# Patient Record
Sex: FEMALE | Race: OTHER | HISPANIC OR LATINO | URBAN - METROPOLITAN AREA
[De-identification: names, ages, dates, MRNs, and addresses within clinical notes are randomized per-mention and may not be internally consistent; named-entity substitution may affect disease eponyms.]

---

## 2017-10-27 ENCOUNTER — EMERGENCY (EMERGENCY)
Facility: HOSPITAL | Age: 82
LOS: 1 days | Discharge: HOME | End: 2017-10-27
Attending: EMERGENCY MEDICINE
Payer: OTHER MISCELLANEOUS

## 2017-10-27 VITALS
TEMPERATURE: 98 F | WEIGHT: 110.01 LBS | RESPIRATION RATE: 18 BRPM | HEIGHT: 59 IN | HEART RATE: 90 BPM | DIASTOLIC BLOOD PRESSURE: 87 MMHG | OXYGEN SATURATION: 97 % | SYSTOLIC BLOOD PRESSURE: 122 MMHG

## 2017-10-27 DIAGNOSIS — Z79.82 LONG TERM (CURRENT) USE OF ASPIRIN: ICD-10-CM

## 2017-10-27 DIAGNOSIS — J44.0 CHRONIC OBSTRUCTIVE PULMONARY DISEASE WITH (ACUTE) LOWER RESPIRATORY INFECTION: ICD-10-CM

## 2017-10-27 DIAGNOSIS — R41.82 ALTERED MENTAL STATUS, UNSPECIFIED: ICD-10-CM

## 2017-10-27 DIAGNOSIS — I10 ESSENTIAL (PRIMARY) HYPERTENSION: ICD-10-CM

## 2017-10-27 DIAGNOSIS — Z86.73 PERSONAL HISTORY OF TRANSIENT ISCHEMIC ATTACK (TIA), AND CEREBRAL INFARCTION WITHOUT RESIDUAL DEFICITS: ICD-10-CM

## 2017-10-27 LAB
ALBUMIN SERPL ELPH-MCNC: 2.3 G/DL — LOW (ref 3.5–5)
ALP SERPL-CCNC: 74 U/L — SIGNIFICANT CHANGE UP (ref 40–120)
ALT FLD-CCNC: 18 U/L DA — SIGNIFICANT CHANGE UP (ref 10–60)
ANION GAP SERPL CALC-SCNC: 5 MMOL/L — SIGNIFICANT CHANGE UP (ref 5–17)
APTT BLD: 27 SEC — LOW (ref 27.5–37.4)
AST SERPL-CCNC: 14 U/L — SIGNIFICANT CHANGE UP (ref 10–40)
BASE EXCESS BLDA CALC-SCNC: 6.9 MMOL/L — HIGH (ref -2–2)
BASOPHILS # BLD AUTO: 0.1 K/UL — SIGNIFICANT CHANGE UP (ref 0–0.2)
BASOPHILS NFR BLD AUTO: 0.5 % — SIGNIFICANT CHANGE UP (ref 0–2)
BILIRUB SERPL-MCNC: 0.2 MG/DL — SIGNIFICANT CHANGE UP (ref 0.2–1.2)
BLOOD GAS COMMENTS ARTERIAL: SIGNIFICANT CHANGE UP
BUN SERPL-MCNC: 14 MG/DL — SIGNIFICANT CHANGE UP (ref 7–18)
CALCIUM SERPL-MCNC: 8.9 MG/DL — SIGNIFICANT CHANGE UP (ref 8.4–10.5)
CHLORIDE SERPL-SCNC: 96 MMOL/L — SIGNIFICANT CHANGE UP (ref 96–108)
CO2 SERPL-SCNC: 32 MMOL/L — HIGH (ref 22–31)
CREAT SERPL-MCNC: 0.33 MG/DL — LOW (ref 0.5–1.3)
EOSINOPHIL # BLD AUTO: 0.8 K/UL — HIGH (ref 0–0.5)
EOSINOPHIL NFR BLD AUTO: 6.8 % — HIGH (ref 0–6)
GLUCOSE SERPL-MCNC: 106 MG/DL — HIGH (ref 70–99)
HCO3 BLDA-SCNC: 32 MMOL/L — HIGH (ref 23–27)
HCT VFR BLD CALC: 42.6 % — SIGNIFICANT CHANGE UP (ref 34.5–45)
HGB BLD-MCNC: 13.9 G/DL — SIGNIFICANT CHANGE UP (ref 11.5–15.5)
HOROWITZ INDEX BLDA+IHG-RTO: 28 — SIGNIFICANT CHANGE UP
INR BLD: 1.06 RATIO — SIGNIFICANT CHANGE UP (ref 0.88–1.16)
LACTATE SERPL-SCNC: 0.8 MMOL/L — SIGNIFICANT CHANGE UP (ref 0.7–2)
LIDOCAIN IGE QN: 74 U/L — SIGNIFICANT CHANGE UP (ref 73–393)
LYMPHOCYTES # BLD AUTO: 17.6 % — SIGNIFICANT CHANGE UP (ref 13–44)
LYMPHOCYTES # BLD AUTO: 2.1 K/UL — SIGNIFICANT CHANGE UP (ref 1–3.3)
MCHC RBC-ENTMCNC: 28.4 PG — SIGNIFICANT CHANGE UP (ref 27–34)
MCHC RBC-ENTMCNC: 32.7 GM/DL — SIGNIFICANT CHANGE UP (ref 32–36)
MCV RBC AUTO: 86.8 FL — SIGNIFICANT CHANGE UP (ref 80–100)
MONOCYTES # BLD AUTO: 0.7 K/UL — SIGNIFICANT CHANGE UP (ref 0–0.9)
MONOCYTES NFR BLD AUTO: 5.8 % — SIGNIFICANT CHANGE UP (ref 2–14)
NEUTROPHILS # BLD AUTO: 8.3 K/UL — HIGH (ref 1.8–7.4)
NEUTROPHILS NFR BLD AUTO: 69.3 % — SIGNIFICANT CHANGE UP (ref 43–77)
PCO2 BLDA: 47 MMHG — HIGH (ref 32–46)
PH BLDA: 7.45 — SIGNIFICANT CHANGE UP (ref 7.35–7.45)
PLATELET # BLD AUTO: 379 K/UL — SIGNIFICANT CHANGE UP (ref 150–400)
PO2 BLDA: 76 MMHG — SIGNIFICANT CHANGE UP (ref 74–108)
POTASSIUM SERPL-MCNC: 4.2 MMOL/L — SIGNIFICANT CHANGE UP (ref 3.5–5.3)
POTASSIUM SERPL-SCNC: 4.2 MMOL/L — SIGNIFICANT CHANGE UP (ref 3.5–5.3)
PROT SERPL-MCNC: 6.7 G/DL — SIGNIFICANT CHANGE UP (ref 6–8.3)
PROTHROM AB SERPL-ACNC: 11.6 SEC — SIGNIFICANT CHANGE UP (ref 9.8–12.7)
RBC # BLD: 4.91 M/UL — SIGNIFICANT CHANGE UP (ref 3.8–5.2)
RBC # FLD: 11.9 % — SIGNIFICANT CHANGE UP (ref 10.3–14.5)
SAO2 % BLDA: 96 % — SIGNIFICANT CHANGE UP (ref 92–96)
SODIUM SERPL-SCNC: 133 MMOL/L — LOW (ref 135–145)
WBC # BLD: 11.9 K/UL — HIGH (ref 3.8–10.5)
WBC # FLD AUTO: 11.9 K/UL — HIGH (ref 3.8–10.5)

## 2017-10-27 PROCEDURE — 71010: CPT | Mod: 26

## 2017-10-27 PROCEDURE — 70450 CT HEAD/BRAIN W/O DYE: CPT | Mod: 26

## 2017-10-27 PROCEDURE — 99285 EMERGENCY DEPT VISIT HI MDM: CPT

## 2017-10-27 RX ORDER — SODIUM CHLORIDE 9 MG/ML
1000 INJECTION INTRAMUSCULAR; INTRAVENOUS; SUBCUTANEOUS ONCE
Qty: 0 | Refills: 0 | Status: COMPLETED | OUTPATIENT
Start: 2017-10-27 | End: 2017-10-27

## 2017-10-27 RX ORDER — SODIUM CHLORIDE 9 MG/ML
3 INJECTION INTRAMUSCULAR; INTRAVENOUS; SUBCUTANEOUS ONCE
Qty: 0 | Refills: 0 | Status: COMPLETED | OUTPATIENT
Start: 2017-10-27 | End: 2017-10-27

## 2017-10-27 RX ADMIN — SODIUM CHLORIDE 3 MILLILITER(S): 9 INJECTION INTRAMUSCULAR; INTRAVENOUS; SUBCUTANEOUS at 22:10

## 2017-10-27 RX ADMIN — SODIUM CHLORIDE 3000 MILLILITER(S): 9 INJECTION INTRAMUSCULAR; INTRAVENOUS; SUBCUTANEOUS at 22:07

## 2017-10-27 NOTE — ED PROVIDER NOTE - PROGRESS NOTE DETAILS
labs shows 11.9k, cmp unremarkable, UA shows contaminated sample with moderate epis, CThead unremarkable, CXR shows no focal infiltrate  discussed above results with patient and family, family report pt currently at baseline mental status  will give levaquin for bronchitis in COPD patient in setting of cough  patient stable for discharge

## 2017-10-27 NOTE — ED PROVIDER NOTE - OBJECTIVE STATEMENT
89 y/o F pt with PMHx of systolic failure, HTN, COPD, and CVA BIB EMS from nursing home ([facility]) for AMS x 1 week and cough x 2 weeks. Per family, pt has been acting confused, lethargic, and has not been eating. Pt denies chest pain, difficulty breathing, abd pain, vomiting or any other complaints. NKDA.

## 2017-10-27 NOTE — ED PROVIDER NOTE - CARE PLAN
Principal Discharge DX:	Acute bronchitis  Secondary Diagnosis:	COPD (chronic obstructive pulmonary disease)

## 2017-10-27 NOTE — ED PROVIDER NOTE - MEDICAL DECISION MAKING DETAILS
Pt presents from nursing home with AMS. Will obtain labs, UA, CXR, and CT head. Given 1L NS for hydration. Will likely admit for further management.

## 2017-10-27 NOTE — ED ADULT NURSE NOTE - OBJECTIVE STATEMENT
presented to ed with  lethargic since monday,decrease appetite.pt.is  awake  and  responsive. daughter  at bedside.not  in  distress

## 2017-10-27 NOTE — ED PROVIDER NOTE - PMH
COPD (chronic obstructive pulmonary disease)    CVA (cerebral vascular accident)    Heart failure, systolic    History of hypertension

## 2017-10-28 VITALS
OXYGEN SATURATION: 98 % | DIASTOLIC BLOOD PRESSURE: 67 MMHG | RESPIRATION RATE: 20 BRPM | HEART RATE: 89 BPM | SYSTOLIC BLOOD PRESSURE: 121 MMHG | TEMPERATURE: 99 F

## 2017-10-28 LAB
APPEARANCE UR: CLEAR — SIGNIFICANT CHANGE UP
BILIRUB UR-MCNC: NEGATIVE — SIGNIFICANT CHANGE UP
COLOR SPEC: YELLOW — SIGNIFICANT CHANGE UP
DIFF PNL FLD: ABNORMAL
GLUCOSE UR QL: NEGATIVE — SIGNIFICANT CHANGE UP
KETONES UR-MCNC: NEGATIVE — SIGNIFICANT CHANGE UP
LEUKOCYTE ESTERASE UR-ACNC: ABNORMAL
NITRITE UR-MCNC: NEGATIVE — SIGNIFICANT CHANGE UP
PH UR: 8 — SIGNIFICANT CHANGE UP (ref 5–8)
PROT UR-MCNC: NEGATIVE — SIGNIFICANT CHANGE UP
SP GR SPEC: 1.01 — SIGNIFICANT CHANGE UP (ref 1.01–1.02)
UROBILINOGEN FLD QL: NEGATIVE — SIGNIFICANT CHANGE UP

## 2017-10-28 PROCEDURE — 81001 URINALYSIS AUTO W/SCOPE: CPT

## 2017-10-28 PROCEDURE — 87040 BLOOD CULTURE FOR BACTERIA: CPT

## 2017-10-28 PROCEDURE — 70450 CT HEAD/BRAIN W/O DYE: CPT

## 2017-10-28 PROCEDURE — 36416 COLLJ CAPILLARY BLOOD SPEC: CPT

## 2017-10-28 PROCEDURE — 87086 URINE CULTURE/COLONY COUNT: CPT

## 2017-10-28 PROCEDURE — 85730 THROMBOPLASTIN TIME PARTIAL: CPT

## 2017-10-28 PROCEDURE — 93005 ELECTROCARDIOGRAM TRACING: CPT

## 2017-10-28 PROCEDURE — 36000 PLACE NEEDLE IN VEIN: CPT

## 2017-10-28 PROCEDURE — 83605 ASSAY OF LACTIC ACID: CPT

## 2017-10-28 PROCEDURE — 80053 COMPREHEN METABOLIC PANEL: CPT

## 2017-10-28 PROCEDURE — 83690 ASSAY OF LIPASE: CPT

## 2017-10-28 PROCEDURE — 82962 GLUCOSE BLOOD TEST: CPT

## 2017-10-28 PROCEDURE — 85027 COMPLETE CBC AUTOMATED: CPT

## 2017-10-28 PROCEDURE — 99284 EMERGENCY DEPT VISIT MOD MDM: CPT | Mod: 25

## 2017-10-28 PROCEDURE — 71045 X-RAY EXAM CHEST 1 VIEW: CPT

## 2017-10-28 PROCEDURE — 82803 BLOOD GASES ANY COMBINATION: CPT

## 2017-10-28 PROCEDURE — 85610 PROTHROMBIN TIME: CPT

## 2017-10-28 RX ORDER — CIPROFLOXACIN LACTATE 400MG/40ML
1 VIAL (ML) INTRAVENOUS
Qty: 7 | Refills: 0 | OUTPATIENT
Start: 2017-10-28 | End: 2017-11-04

## 2017-10-29 LAB
CULTURE RESULTS: SIGNIFICANT CHANGE UP
SPECIMEN SOURCE: SIGNIFICANT CHANGE UP

## 2017-11-02 LAB
CULTURE RESULTS: SIGNIFICANT CHANGE UP
CULTURE RESULTS: SIGNIFICANT CHANGE UP
SPECIMEN SOURCE: SIGNIFICANT CHANGE UP
SPECIMEN SOURCE: SIGNIFICANT CHANGE UP

## 2018-05-08 ENCOUNTER — INPATIENT (INPATIENT)
Facility: HOSPITAL | Age: 83
LOS: 7 days | Discharge: NURSING, REHAB OR LONG-TERM CARE | DRG: 682 | End: 2018-05-16
Attending: INTERNAL MEDICINE | Admitting: INTERNAL MEDICINE
Payer: MEDICARE

## 2018-05-08 VITALS
OXYGEN SATURATION: 99 % | RESPIRATION RATE: 18 BRPM | WEIGHT: 130.07 LBS | SYSTOLIC BLOOD PRESSURE: 140 MMHG | DIASTOLIC BLOOD PRESSURE: 77 MMHG | HEIGHT: 64 IN | HEART RATE: 82 BPM

## 2018-05-08 DIAGNOSIS — I10 ESSENTIAL (PRIMARY) HYPERTENSION: ICD-10-CM

## 2018-05-08 DIAGNOSIS — K57.33 DIVERTICULITIS OF LARGE INTESTINE WITHOUT PERFORATION OR ABSCESS WITH BLEEDING: ICD-10-CM

## 2018-05-08 DIAGNOSIS — J44.9 CHRONIC OBSTRUCTIVE PULMONARY DISEASE, UNSPECIFIED: ICD-10-CM

## 2018-05-08 DIAGNOSIS — G93.40 ENCEPHALOPATHY, UNSPECIFIED: ICD-10-CM

## 2018-05-08 DIAGNOSIS — Z29.9 ENCOUNTER FOR PROPHYLACTIC MEASURES, UNSPECIFIED: ICD-10-CM

## 2018-05-08 DIAGNOSIS — N17.9 ACUTE KIDNEY FAILURE, UNSPECIFIED: ICD-10-CM

## 2018-05-08 DIAGNOSIS — E87.0 HYPEROSMOLALITY AND HYPERNATREMIA: ICD-10-CM

## 2018-05-08 DIAGNOSIS — N13.30 UNSPECIFIED HYDRONEPHROSIS: ICD-10-CM

## 2018-05-08 DIAGNOSIS — K57.32 DIVERTICULITIS OF LARGE INTESTINE WITHOUT PERFORATION OR ABSCESS WITHOUT BLEEDING: ICD-10-CM

## 2018-05-08 LAB
ALBUMIN SERPL ELPH-MCNC: 2.3 G/DL — LOW (ref 3.5–5)
ALP SERPL-CCNC: 52 U/L — SIGNIFICANT CHANGE UP (ref 40–120)
ALT FLD-CCNC: 11 U/L DA — SIGNIFICANT CHANGE UP (ref 10–60)
ANION GAP SERPL CALC-SCNC: 6 MMOL/L — SIGNIFICANT CHANGE UP (ref 5–17)
APPEARANCE UR: CLEAR — SIGNIFICANT CHANGE UP
AST SERPL-CCNC: 19 U/L — SIGNIFICANT CHANGE UP (ref 10–40)
BASOPHILS # BLD AUTO: 0.1 K/UL — SIGNIFICANT CHANGE UP (ref 0–0.2)
BASOPHILS NFR BLD AUTO: 0.5 % — SIGNIFICANT CHANGE UP (ref 0–2)
BILIRUB SERPL-MCNC: 0.3 MG/DL — SIGNIFICANT CHANGE UP (ref 0.2–1.2)
BILIRUB UR-MCNC: NEGATIVE — SIGNIFICANT CHANGE UP
BUN SERPL-MCNC: 73 MG/DL — HIGH (ref 7–18)
CALCIUM SERPL-MCNC: 8.5 MG/DL — SIGNIFICANT CHANGE UP (ref 8.4–10.5)
CHLORIDE SERPL-SCNC: 117 MMOL/L — HIGH (ref 96–108)
CO2 SERPL-SCNC: 27 MMOL/L — SIGNIFICANT CHANGE UP (ref 22–31)
COLOR SPEC: YELLOW — SIGNIFICANT CHANGE UP
CREAT SERPL-MCNC: 2.12 MG/DL — HIGH (ref 0.5–1.3)
DIFF PNL FLD: ABNORMAL
EOSINOPHIL # BLD AUTO: 0.5 K/UL — SIGNIFICANT CHANGE UP (ref 0–0.5)
EOSINOPHIL NFR BLD AUTO: 4 % — SIGNIFICANT CHANGE UP (ref 0–6)
GLUCOSE SERPL-MCNC: 88 MG/DL — SIGNIFICANT CHANGE UP (ref 70–99)
GLUCOSE UR QL: NEGATIVE — SIGNIFICANT CHANGE UP
HCT VFR BLD CALC: 42.8 % — SIGNIFICANT CHANGE UP (ref 34.5–45)
HGB BLD-MCNC: 13.6 G/DL — SIGNIFICANT CHANGE UP (ref 11.5–15.5)
KETONES UR-MCNC: NEGATIVE — SIGNIFICANT CHANGE UP
LEUKOCYTE ESTERASE UR-ACNC: ABNORMAL
LYMPHOCYTES # BLD AUTO: 1.4 K/UL — SIGNIFICANT CHANGE UP (ref 1–3.3)
LYMPHOCYTES # BLD AUTO: 10.7 % — LOW (ref 13–44)
MCHC RBC-ENTMCNC: 27.9 PG — SIGNIFICANT CHANGE UP (ref 27–34)
MCHC RBC-ENTMCNC: 31.8 GM/DL — LOW (ref 32–36)
MCV RBC AUTO: 87.7 FL — SIGNIFICANT CHANGE UP (ref 80–100)
MONOCYTES # BLD AUTO: 0.8 K/UL — SIGNIFICANT CHANGE UP (ref 0–0.9)
MONOCYTES NFR BLD AUTO: 5.9 % — SIGNIFICANT CHANGE UP (ref 2–14)
NEUTROPHILS # BLD AUTO: 10.2 K/UL — HIGH (ref 1.8–7.4)
NEUTROPHILS NFR BLD AUTO: 78.8 % — HIGH (ref 43–77)
NITRITE UR-MCNC: NEGATIVE — SIGNIFICANT CHANGE UP
PH UR: 6 — SIGNIFICANT CHANGE UP (ref 5–8)
PLATELET # BLD AUTO: 211 K/UL — SIGNIFICANT CHANGE UP (ref 150–400)
POTASSIUM SERPL-MCNC: 4.2 MMOL/L — SIGNIFICANT CHANGE UP (ref 3.5–5.3)
POTASSIUM SERPL-SCNC: 4.2 MMOL/L — SIGNIFICANT CHANGE UP (ref 3.5–5.3)
PROT SERPL-MCNC: 7.1 G/DL — SIGNIFICANT CHANGE UP (ref 6–8.3)
PROT UR-MCNC: 15
RBC # BLD: 4.89 M/UL — SIGNIFICANT CHANGE UP (ref 3.8–5.2)
RBC # FLD: 12.8 % — SIGNIFICANT CHANGE UP (ref 10.3–14.5)
SODIUM SERPL-SCNC: 150 MMOL/L — HIGH (ref 135–145)
SP GR SPEC: 1.01 — SIGNIFICANT CHANGE UP (ref 1.01–1.02)
UROBILINOGEN FLD QL: NEGATIVE — SIGNIFICANT CHANGE UP
WBC # BLD: 12.9 K/UL — HIGH (ref 3.8–10.5)
WBC # FLD AUTO: 12.9 K/UL — HIGH (ref 3.8–10.5)

## 2018-05-08 PROCEDURE — 99285 EMERGENCY DEPT VISIT HI MDM: CPT

## 2018-05-08 PROCEDURE — 74176 CT ABD & PELVIS W/O CONTRAST: CPT | Mod: 26

## 2018-05-08 PROCEDURE — 70450 CT HEAD/BRAIN W/O DYE: CPT | Mod: 26

## 2018-05-08 RX ORDER — POLYETHYLENE GLYCOL 3350 17 G/17G
17 POWDER, FOR SOLUTION ORAL DAILY
Qty: 0 | Refills: 0 | Status: DISCONTINUED | OUTPATIENT
Start: 2018-05-08 | End: 2018-05-16

## 2018-05-08 RX ORDER — FAMOTIDINE 10 MG/ML
20 INJECTION INTRAVENOUS DAILY
Qty: 0 | Refills: 0 | Status: DISCONTINUED | OUTPATIENT
Start: 2018-05-08 | End: 2018-05-16

## 2018-05-08 RX ORDER — ASCORBIC ACID 60 MG
500 TABLET,CHEWABLE ORAL DAILY
Qty: 0 | Refills: 0 | Status: DISCONTINUED | OUTPATIENT
Start: 2018-05-08 | End: 2018-05-16

## 2018-05-08 RX ORDER — SENNA PLUS 8.6 MG/1
2 TABLET ORAL
Qty: 0 | Refills: 0 | Status: DISCONTINUED | OUTPATIENT
Start: 2018-05-08 | End: 2018-05-16

## 2018-05-08 RX ORDER — SIMVASTATIN 20 MG/1
1 TABLET, FILM COATED ORAL
Qty: 0 | Refills: 0 | COMMUNITY

## 2018-05-08 RX ORDER — LACTULOSE 10 G/15ML
20 SOLUTION ORAL DAILY
Qty: 0 | Refills: 0 | Status: DISCONTINUED | OUTPATIENT
Start: 2018-05-08 | End: 2018-05-16

## 2018-05-08 RX ORDER — ASCORBIC ACID 60 MG
1 TABLET,CHEWABLE ORAL
Qty: 0 | Refills: 0 | COMMUNITY

## 2018-05-08 RX ORDER — PIPERACILLIN AND TAZOBACTAM 4; .5 G/20ML; G/20ML
3.38 INJECTION, POWDER, LYOPHILIZED, FOR SOLUTION INTRAVENOUS EVERY 12 HOURS
Qty: 0 | Refills: 0 | Status: DISCONTINUED | OUTPATIENT
Start: 2018-05-08 | End: 2018-05-16

## 2018-05-08 RX ORDER — CALCIUM CARBONATE 500(1250)
1 TABLET ORAL
Qty: 0 | Refills: 0 | COMMUNITY

## 2018-05-08 RX ORDER — CARVEDILOL PHOSPHATE 80 MG/1
3.12 CAPSULE, EXTENDED RELEASE ORAL EVERY 12 HOURS
Qty: 0 | Refills: 0 | Status: DISCONTINUED | OUTPATIENT
Start: 2018-05-08 | End: 2018-05-16

## 2018-05-08 RX ORDER — CALCIUM CARBONATE 500(1250)
1 TABLET ORAL
Qty: 0 | Refills: 0 | Status: DISCONTINUED | OUTPATIENT
Start: 2018-05-08 | End: 2018-05-16

## 2018-05-08 RX ORDER — SODIUM CHLORIDE 9 MG/ML
1000 INJECTION, SOLUTION INTRAVENOUS
Qty: 0 | Refills: 0 | Status: DISCONTINUED | OUTPATIENT
Start: 2018-05-08 | End: 2018-05-10

## 2018-05-08 RX ORDER — CARVEDILOL PHOSPHATE 80 MG/1
1 CAPSULE, EXTENDED RELEASE ORAL
Qty: 0 | Refills: 0 | COMMUNITY

## 2018-05-08 RX ORDER — HEPARIN SODIUM 5000 [USP'U]/ML
5000 INJECTION INTRAVENOUS; SUBCUTANEOUS EVERY 12 HOURS
Qty: 0 | Refills: 0 | Status: DISCONTINUED | OUTPATIENT
Start: 2018-05-08 | End: 2018-05-16

## 2018-05-08 RX ORDER — ASPIRIN/CALCIUM CARB/MAGNESIUM 324 MG
81 TABLET ORAL DAILY
Qty: 0 | Refills: 0 | Status: DISCONTINUED | OUTPATIENT
Start: 2018-05-08 | End: 2018-05-16

## 2018-05-08 RX ORDER — LISINOPRIL 2.5 MG/1
1 TABLET ORAL
Qty: 0 | Refills: 0 | COMMUNITY

## 2018-05-08 RX ORDER — DOCUSATE SODIUM 100 MG
3 CAPSULE ORAL
Qty: 0 | Refills: 0 | COMMUNITY

## 2018-05-08 RX ORDER — SODIUM CHLORIDE 9 MG/ML
1 INJECTION INTRAMUSCULAR; INTRAVENOUS; SUBCUTANEOUS
Qty: 0 | Refills: 0 | COMMUNITY

## 2018-05-08 RX ORDER — RANITIDINE HYDROCHLORIDE 150 MG/1
1 TABLET, FILM COATED ORAL
Qty: 0 | Refills: 0 | COMMUNITY

## 2018-05-08 RX ORDER — SODIUM CHLORIDE 9 MG/ML
1000 INJECTION INTRAMUSCULAR; INTRAVENOUS; SUBCUTANEOUS ONCE
Qty: 0 | Refills: 0 | Status: COMPLETED | OUTPATIENT
Start: 2018-05-08 | End: 2018-05-08

## 2018-05-08 RX ORDER — DOCUSATE SODIUM 100 MG
300 CAPSULE ORAL DAILY
Qty: 0 | Refills: 0 | Status: DISCONTINUED | OUTPATIENT
Start: 2018-05-08 | End: 2018-05-16

## 2018-05-08 RX ORDER — ASPIRIN/CALCIUM CARB/MAGNESIUM 324 MG
1 TABLET ORAL
Qty: 0 | Refills: 0 | COMMUNITY

## 2018-05-08 RX ORDER — SENNA PLUS 8.6 MG/1
2 TABLET ORAL
Qty: 0 | Refills: 0 | COMMUNITY

## 2018-05-08 RX ORDER — IPRATROPIUM/ALBUTEROL SULFATE 18-103MCG
3 AEROSOL WITH ADAPTER (GRAM) INHALATION
Qty: 0 | Refills: 0 | COMMUNITY

## 2018-05-08 RX ORDER — IPRATROPIUM/ALBUTEROL SULFATE 18-103MCG
3 AEROSOL WITH ADAPTER (GRAM) INHALATION EVERY 6 HOURS
Qty: 0 | Refills: 0 | Status: DISCONTINUED | OUTPATIENT
Start: 2018-05-08 | End: 2018-05-16

## 2018-05-08 RX ADMIN — SODIUM CHLORIDE 250 MILLILITER(S): 9 INJECTION INTRAMUSCULAR; INTRAVENOUS; SUBCUTANEOUS at 18:23

## 2018-05-08 NOTE — H&P ADULT - PROBLEM SELECTOR PLAN 7
IMPROVE VTE Individual Risk Assessment    RISK                                                          Points  [] Previous VTE                                           3  [] Thrombophilia                                        2  [] Lower limb paralysis                              2   [] Current Cancer                                       2   [x] Immobilization > 24 hrs                        1  [] ICU/CCU stay > 24 hours                       1  [x] Age > 60                                                   1    IMPROVE VTE Score: 2, will give hsq for dvt ppx  c/w home zantac for gastritis

## 2018-05-08 NOTE — H&P ADULT - PMH
CAD (coronary artery disease)    COPD (chronic obstructive pulmonary disease)    CVA (cerebral vascular accident)    GERD (gastroesophageal reflux disease)    History of hypertension

## 2018-05-08 NOTE — H&P ADULT - PROBLEM SELECTOR PLAN 2
possibly a combination of pre-renal and post-renal  -will treat pre-renal with IV fluids as per below  -will place patrick for post-renal  -monitor renal function daily for improvement

## 2018-05-08 NOTE — ED PROVIDER NOTE - PMH
COPD (chronic obstructive pulmonary disease)    CVA (cerebral vascular accident)    Dementia    Heart failure, systolic    History of hypertension

## 2018-05-08 NOTE — ED PROVIDER NOTE - OBJECTIVE STATEMENT
89 y/o F pt with PMHx of COPD, CVA, Dementia, Systolic Heart Failure, and HTN and no significant PSHx BIB family from nursing home to ED with decreased PO intake and failure to thrive noted today. Family reports pt also with mild vague signs of abdominal pain. Per family, pt is dry appearing, but pt is currently at her baseline mental status. Family states pt appears mildly weaker than usual. Family deny any other pt complaints. Hx and HPI limited due to pt's condition (dementia). NKDA.

## 2018-05-08 NOTE — H&P ADULT - PROBLEM SELECTOR PLAN 5
likely a combination of infection, acute renal failure and hypernatremia  -will treat each disease and await improvement  -however given advanced age it is possible that encephalopathy may not reverse

## 2018-05-08 NOTE — ED ADULT NURSE NOTE - ED STAT RN HANDOFF DETAILS 2
Received patient from RN Pita admit to medicine, with family at bedside . with patrick catheter draining in no acute distress. Continue to monitor patient.. No bed assigned as yet.

## 2018-05-08 NOTE — H&P ADULT - ASSESSMENT
89F from [facility] HTN, COPD, CVA w/ residual right sided weakness, HLD, Hyponatremia, CAD, Gastritis, mild cognitive impairment who comes to the hospital for failure to thrive and altered mental status. Patient found to have sigmoid diverticulitis, acute renal failure, hypernatremia, and failure to thrive

## 2018-05-08 NOTE — ED PROVIDER NOTE - MEDICAL DECISION MAKING DETAILS
89 y/o F pt presents with failure to thrive. Will evaluate for abdominal pathology, electrolyte abnormalities, and soft fluid resuscitation. Will admit.

## 2018-05-08 NOTE — H&P ADULT - NSHPLABSRESULTS_GEN_ALL_CORE
elevated wbc  H/H wnl  Na at 150  Other lytes roughly wnl  Cr. 2.12, previously was 0.33 in Oct 2017  UA negative with some granular casts and hyaline casts  CT shows no lung pathology but shows sigmoid diverticulitis and bilateral mild hydronephrosis

## 2018-05-08 NOTE — ED ADULT NURSE NOTE - ED STAT RN HANDOFF DETAILS
Pt endorsed to JAQUAN Frankel. pt has stage 1 redness on her back. pt medicated per order. pt hemodynamically stable. Pt not in any acute distress.

## 2018-05-08 NOTE — CONSULT NOTE ADULT - ASSESSMENT
89 y/o F pt with PMHx of systolic failure, HTN, COPD, and CVA BIB EMS from nursing home ([facility]) for AMS x 1 week and cough x 2 weeks. Per family, pt has been acting confused, lethargic, and has not been eating. Pt denies chest pain, difficulty breathing, abd pain, vomiting or any other complaints.  CT of abdomen showed Sigmoid diverticulitis without abscess or perforation, Mild bilateral hydronephrosis may be due to distended bladder, and Cholelithiasis

## 2018-05-08 NOTE — H&P ADULT - PROBLEM SELECTOR PLAN 1
patient has elevated wbc and ct findings of infection  -will treat with zosyn as per discussion with Dr. Villagomez  -monitor wbc count  -monitor for clinical improvement  -hydrate with IV fluids

## 2018-05-08 NOTE — H&P ADULT - NSHPPHYSICALEXAM_GEN_ALL_CORE
Vital Signs Last 24 Hrs  T(C): 36.6 (08 May 2018 16:37), Max: 36.6 (08 May 2018 16:37)  T(F): 97.8 (08 May 2018 16:37), Max: 97.8 (08 May 2018 16:37)  HR: 83 (08 May 2018 16:37) (82 - 83)  BP: 128/84 (08 May 2018 16:37) (128/84 - 140/77)  BP(mean): --  RR: 18 (08 May 2018 16:37) (18 - 18)  SpO2: 100% (08 May 2018 16:37) (99% - 100%)    GENERAL: NAD, well-groomed, well-developed  HEAD:  Atraumatic, Normocephalic  EYES: EOMI, PERRLA, conjunctiva and sclera clear  ENMT: No tonsillar erythema, exudates, or enlargement; dry mucous membranes  NECK: Supple, No JVD, Normal thyroid  NERVOUS SYSTEM:  Alert & Oriented X1 but follows commands  CHEST/LUNG: Clear to auscultation bilaterally; No rales, rhonchi, wheezing, or rubs  HEART: Regular rate and rhythm; No murmurs, rubs, or gallops  ABDOMEN: Soft, Nontender, Nondistended; Bowel sounds present  EXTREMITIES:  2+ Peripheral Pulses, non pitting pedal edema

## 2018-05-08 NOTE — CONSULT NOTE ADULT - SUBJECTIVE AND OBJECTIVE BOX
HPI:  89 y/o F pt with PMHx of systolic failure, HTN, COPD, and CVA BIB EMS from nursing home ([facility]) for AMS x 1 week and cough x 2 weeks. Per family, pt has been acting confused, lethargic, and has not been eating. Pt denies chest pain, difficulty breathing, abd pain, vomiting or any other complaints.    PAST MEDICAL & SURGICAL HISTORY:  Dementia  CVA (cerebral vascular accident)  COPD (chronic obstructive pulmonary disease)  Heart failure, systolic  History of hypertension  No significant past surgical history      Allergy Status Unknown          Social Hx:    FAMILY HISTORY:  No pertinent family history in first degree relatives        ROS  [  ] UNABLE TO ELICIT    General:  [  ] None  [  ] Fever  [  ] Chills  [  ] Malaise    Skin:  [ x ] None [  ] Rash  [  ] Wound  [  ] Ulcer    HEENT:  [ x ] None  [  ] Sore Throat  [  ] Nasal congestion/ runny nose  [  ] Photophobia [  ] Neck pain      Chest:  [  ] None   [  ] SOB  [ x ] Cough  [  ] None    Cardiovascular:   [ x ] None  [  ] CP  [  ] Palpitation    Gastrointestinal:  [ x ] None  [  ] Abd pain   [  ] Nausea    [  ] Vomiting   [  ] Diarrhea	     Genitourinary:  [ x ] None [  ] Polyuria   [  ] Urgency  [  ] Frequency  [  ] Dysuria    [  ]  Hematuria       Musculoskeletal:  [  ] None [  ] Back Pain	[  ] Body aches  [  ] Joint pain  [ x ] Weakness    Neurological: [  ] None [  ]Dizziness  [  ]Visual Disturbance  [  ]Headaches   [ x ] Weakness      PHYSICAL EXAM:    Vital Signs Last 24 Hrs  T(C): 36.6 (08 May 2018 16:37), Max: 36.6 (08 May 2018 16:37)  T(F): 97.8 (08 May 2018 16:37), Max: 97.8 (08 May 2018 16:37)  HR: 83 (08 May 2018 16:37) (82 - 83)  BP: 128/84 (08 May 2018 16:37) (128/84 - 140/77)  BP(mean): --  RR: 18 (08 May 2018 16:37) (18 - 18)  SpO2: 100% (08 May 2018 16:37) (99% - 100%)    Constitutional:    HEENT: [ x ] Wnl  [  ] Pharyngeal congestion    Neck:  [ x ] Supple  [  ]Lymphadenopathy  [ x ] No JVD   [  ] JVD  [  ] Masses   [  ] WNL    CHEST/Respiratory:  [  ]Clear to auscultation  [ x ] Rales   [  ] Rhonchi   [  ] Wheezing     [  ] Chest Tenderness      Cardiovascular:  [ x ] Reg S1 S2   [  ] Irreg S1 S2   [ x ]No Murmur  [  ] +ve Murmurs  [  ]Systolic [  ]Diastolic      Abdomen:  [ x ] Soft  [ x ] No tendrerness  [  ] Tenderness  [  ] Organomegaly  [  ] ABD Distention  [  ] Rigidity                       [ x ] No Regidity                       [ x ] No Rebound Tenderness  [  ] No Guarding Rigidity  [  ] Rebound Tenderness[  ] Guarding Rigidity                          [ x ]  +ve Bowel Sounds  [  ] Decreased Bowel Sounds    [  ] Absent Bowel Sounds                            Extremities: [ x ] No edema [  ] Edema  [  ] Clubbing   [  ] Cyanosis                         [ x ] No Tender Calf muscles  [  ] Tender Calf muscles                        [ x ] Palpable peripheral pulses [ x ] Contracted right hand    Neurological: [ x ] Awake  [ x ] Alert  [ x ] Oriented  x  2                           [  ] Confused  [  ] Drowsy  [  ] respond to painful stimuli  [  ] Unresponsive    Skin:  [ x ] Intact [  ] Redness [  ] Thrombophlebitis  [  ] Rashes  [  ] Dry  [  ] Ulcers    Ortho:  [  ] Joint Swelling  [  ] Joint erythema [  ] Joint tenderness                [  ] Increased temp. to touch  [ x ] DJD [  ] WNL      LABS/DIAGNOSTIC TESTS                          13.6   12.9  )-----------( 211      ( 08 May 2018 15:20 )             42.8     WBC Count: 12.9 K/uL ( @ 15:20)      0508    150<H>  |  117<H>  |  73<H>  ----------------------------<  88  4.2   |  27  |  2.12<H>    Ca    8.5      08 May 2018 15:20    TPro  7.1  /  Alb  2.3<L>  /  TBili  0.3  /  DBili  x   /  AST  19  /  ALT  11  /  AlkPhos  52        Urinalysis Basic - ( 08 May 2018 17:44 )    Color: Yellow / Appearance: Clear / S.010 / pH: x  Gluc: x / Ketone: Negative  / Bili: Negative / Urobili: Negative   Blood: x / Protein: 15 / Nitrite: Negative   Leuk Esterase: Trace / RBC: 10-25 /HPF / WBC 3-5 /HPF   Sq Epi: x / Non Sq Epi: Few /HPF / Bacteria: Few /HPF        LIVER FUNCTIONS - ( 08 May 2018 15:20 )  Alb: 2.3 g/dL / Pro: 7.1 g/dL / ALK PHOS: 52 U/L / ALT: 11 U/L DA / AST: 19 U/L / GGT: x                 LACTATE:      CULTURES:   None yet.    RADIOLOGY      EXAM:  CT ABDOMEN AND PELVIS                            PROCEDURE DATE:  2018          INTERPRETATION:  CLINICAL STATEMENT: abd pain, distension, r/o volvulus    TECHNIQUE: CT of the abdomen and pelvis was performed in the axial plane   utilizing thin slices without IV or oral contrast. Sagittal and coronal   reformatting was performed.    COMPARISON: None.    FINDINGS:    The lower chest demonstrates areas of linear scarring in the lingula and   right lower lobe.    The evaluation of theabdominal viscera and the bowel is limited without   contrast.    The liver is unremarkable. Cholelithiasis is seen.    The spleen, pancreas and adrenal glands are grossly unremarkable.    There is mild bilateral hydronephrosis or urinary calculus. There is a   small cyst in the lateral midpole of the right kidney. The bladder is   unremarkable    There is no bowel obstruction.  There is no intraperitoneal free air.    There is no free fluid.The appendix is seen. There is colonic   diverticulosis. There is stranding in the fat surrounding the sigmoid   colon in the lower abdomen in the midline suggestive of diverticulitis   without evidence of abscess or perforation    There is no abdominal or pelvic lymphadenopathy.  The pelvic structures   are grossly unremarkable.    The aorta is not aneurysmal. There is no significant retroperitoneal or   pelvic lymphadenopathy. Pelvic structures are remarkable for   calcifications in the uterus which may represent fibroids..    The bony structures demonstrate degenerative changes of the spine with   levoscoliosis of the lumbar spine.    IMPRESSION:  Sigmoid diverticulitis without abscess or perforation  Mild bilateral hydronephrosis may be due to distended bladder  Cholelithiasis        EXAM:  CT BRAIN                            PROCEDURE DATE:  2018          INTERPRETATION:  Head CT without IV contrast     Clinical Indication: Altered mental status    Technique: Axial CT images of the head are obtained from the skull base   to the vertex without IV contrast.    Comparison: 10/27/2017.    Findings: There is no acute intracranial hemorrhage. No CT evidence for   an acute territorial infarct. Stable patchy hypodensities in the   periventricular and subcortical white matterbilaterally, compatible with   chronic small vessel ischemic disease. Stable encephalomalacia in the   right cerebellum, compatible with an old infarct. There is no   space-occupying lesion, midline shift, or herniation. The ventricles   maintain normal size, shape, and location. No extra-axial fluid   collection.  The visualized paranasal sinuses and orbits appear grossly   unremarkable.    Impression: No acute intracranial hemorrhage. No CT evidence for an acute   territorial infarct.    Stable chronic small vessel ischemic disease and old infarct.    If clinically indicated, brain MR may be pursued for further evaluation.

## 2018-05-08 NOTE — H&P ADULT - HISTORY OF PRESENT ILLNESS
89F from [facility] HTN, COPD, CVA w/ residual right sided weakness, HLD, Hyponatremia, CAD, Gastritis, mild cognitive impairment who comes to the hospital for failure to thrive and altered mental status. Note from the facility and history for family says that she has been having poor appetite for the past few days and is very lethargic and not talking. Note from facility also says that she had abdominal distension and they did a KUB with negative result. Patient was then transferred to the hospital for evaluation.  Labs done at nursing home also note a WBC of 12.73  CXR was negative for acute disease.

## 2018-05-08 NOTE — ED PROVIDER NOTE - PROGRESS NOTE DETAILS
Pt found to be hypernatremic with FILIPE and no obvious signs of SBO on CT Abd/Pel. Pt is stable for admission +prerenal azotemia

## 2018-05-08 NOTE — ED ADULT NURSE NOTE - OBJECTIVE STATEMENT
Patient came to the ED BIBA from the nursing home for AMS, poor appetite. Patient is only responsive to tactile stimuli.

## 2018-05-08 NOTE — ED ADULT NURSE REASSESSMENT NOTE - NS ED NURSE REASSESS COMMENT FT1
Pt was catheterized with indwelling catheter. Urine draining well. Pt not in any acute distress. Pt was catheterized with indwelling catheter. Urine draining well. Pt had passed stool. Pt cleaned. Pt has redness on her back. Pt not in any acute distress.

## 2018-05-09 LAB
ANION GAP SERPL CALC-SCNC: 4 MMOL/L — LOW (ref 5–17)
BUN SERPL-MCNC: 36 MG/DL — HIGH (ref 7–18)
CALCIUM SERPL-MCNC: 8 MG/DL — LOW (ref 8.4–10.5)
CHLORIDE SERPL-SCNC: 117 MMOL/L — HIGH (ref 96–108)
CHOLEST SERPL-MCNC: 94 MG/DL — SIGNIFICANT CHANGE UP (ref 10–199)
CO2 SERPL-SCNC: 29 MMOL/L — SIGNIFICANT CHANGE UP (ref 22–31)
CREAT ?TM UR-MCNC: 31 MG/DL — SIGNIFICANT CHANGE UP
CREAT SERPL-MCNC: 0.88 MG/DL — SIGNIFICANT CHANGE UP (ref 0.5–1.3)
FERRITIN SERPL-MCNC: 393 NG/ML — HIGH (ref 15–150)
FOLATE SERPL-MCNC: 17.3 NG/ML — SIGNIFICANT CHANGE UP
GLUCOSE SERPL-MCNC: 142 MG/DL — HIGH (ref 70–99)
HBA1C BLD-MCNC: 5.5 % — SIGNIFICANT CHANGE UP (ref 4–5.6)
HCT VFR BLD CALC: 36.8 % — SIGNIFICANT CHANGE UP (ref 34.5–45)
HDLC SERPL-MCNC: 29 MG/DL — LOW (ref 40–125)
HGB BLD-MCNC: 11.5 G/DL — SIGNIFICANT CHANGE UP (ref 11.5–15.5)
IRON SATN MFR SERPL: 13 % — LOW (ref 15–50)
IRON SATN MFR SERPL: 16 UG/DL — LOW (ref 40–150)
LIPID PNL WITH DIRECT LDL SERPL: 44 MG/DL — SIGNIFICANT CHANGE UP
MAGNESIUM SERPL-MCNC: 2.8 MG/DL — HIGH (ref 1.6–2.6)
MCHC RBC-ENTMCNC: 27.6 PG — SIGNIFICANT CHANGE UP (ref 27–34)
MCHC RBC-ENTMCNC: 31.2 GM/DL — LOW (ref 32–36)
MCV RBC AUTO: 88.6 FL — SIGNIFICANT CHANGE UP (ref 80–100)
OSMOLALITY UR: 585 MOS/KG — SIGNIFICANT CHANGE UP (ref 50–1200)
PHOSPHATE SERPL-MCNC: 2.6 MG/DL — SIGNIFICANT CHANGE UP (ref 2.5–4.5)
PLATELET # BLD AUTO: 180 K/UL — SIGNIFICANT CHANGE UP (ref 150–400)
POTASSIUM SERPL-MCNC: 3.2 MMOL/L — LOW (ref 3.5–5.3)
POTASSIUM SERPL-SCNC: 3.2 MMOL/L — LOW (ref 3.5–5.3)
RBC # BLD: 4.04 M/UL — SIGNIFICANT CHANGE UP (ref 3.8–5.2)
RBC # BLD: 4.15 M/UL — SIGNIFICANT CHANGE UP (ref 3.8–5.2)
RBC # FLD: 12.7 % — SIGNIFICANT CHANGE UP (ref 10.3–14.5)
RETICS #: 29.9 K/UL — SIGNIFICANT CHANGE UP (ref 25–125)
RETICS/RBC NFR: 0.7 % — SIGNIFICANT CHANGE UP (ref 0.5–2.5)
SODIUM SERPL-SCNC: 150 MMOL/L — HIGH (ref 135–145)
SODIUM UR-SCNC: 127 MMOL/L — SIGNIFICANT CHANGE UP (ref 40–220)
TIBC SERPL-MCNC: 125 UG/DL — LOW (ref 250–450)
TOTAL CHOLESTEROL/HDL RATIO MEASUREMENT: 3.2 RATIO — LOW (ref 3.3–7.1)
TRIGL SERPL-MCNC: 107 MG/DL — SIGNIFICANT CHANGE UP (ref 10–149)
TSH SERPL-MCNC: 0.66 UU/ML — SIGNIFICANT CHANGE UP (ref 0.34–4.82)
UIBC SERPL-MCNC: 109 UG/DL — LOW (ref 110–370)
VIT B12 SERPL-MCNC: 898 PG/ML — SIGNIFICANT CHANGE UP (ref 232–1245)
WBC # BLD: 12.3 K/UL — HIGH (ref 3.8–10.5)
WBC # FLD AUTO: 12.3 K/UL — HIGH (ref 3.8–10.5)

## 2018-05-09 RX ORDER — POTASSIUM CHLORIDE 20 MEQ
40 PACKET (EA) ORAL ONCE
Qty: 0 | Refills: 0 | Status: COMPLETED | OUTPATIENT
Start: 2018-05-09 | End: 2018-05-09

## 2018-05-09 RX ADMIN — Medication 1 TABLET(S): at 18:15

## 2018-05-09 RX ADMIN — SENNA PLUS 2 TABLET(S): 8.6 TABLET ORAL at 05:37

## 2018-05-09 RX ADMIN — POLYETHYLENE GLYCOL 3350 17 GRAM(S): 17 POWDER, FOR SOLUTION ORAL at 14:22

## 2018-05-09 RX ADMIN — PIPERACILLIN AND TAZOBACTAM 25 GRAM(S): 4; .5 INJECTION, POWDER, LYOPHILIZED, FOR SOLUTION INTRAVENOUS at 05:37

## 2018-05-09 RX ADMIN — Medication 1 TABLET(S): at 11:36

## 2018-05-09 RX ADMIN — SODIUM CHLORIDE 60 MILLILITER(S): 9 INJECTION, SOLUTION INTRAVENOUS at 22:00

## 2018-05-09 RX ADMIN — PIPERACILLIN AND TAZOBACTAM 25 GRAM(S): 4; .5 INJECTION, POWDER, LYOPHILIZED, FOR SOLUTION INTRAVENOUS at 18:15

## 2018-05-09 RX ADMIN — Medication 500 MILLIGRAM(S): at 11:36

## 2018-05-09 RX ADMIN — LACTULOSE 20 GRAM(S): 10 SOLUTION ORAL at 14:23

## 2018-05-09 RX ADMIN — CARVEDILOL PHOSPHATE 3.12 MILLIGRAM(S): 80 CAPSULE, EXTENDED RELEASE ORAL at 18:16

## 2018-05-09 RX ADMIN — FAMOTIDINE 20 MILLIGRAM(S): 10 INJECTION INTRAVENOUS at 11:36

## 2018-05-09 RX ADMIN — Medication 10 MILLIGRAM(S): at 14:23

## 2018-05-09 RX ADMIN — Medication 3 MILLILITER(S): at 04:22

## 2018-05-09 RX ADMIN — HEPARIN SODIUM 5000 UNIT(S): 5000 INJECTION INTRAVENOUS; SUBCUTANEOUS at 18:15

## 2018-05-09 RX ADMIN — SENNA PLUS 2 TABLET(S): 8.6 TABLET ORAL at 18:15

## 2018-05-09 RX ADMIN — Medication 81 MILLIGRAM(S): at 11:36

## 2018-05-09 RX ADMIN — Medication 300 MILLIGRAM(S): at 11:36

## 2018-05-09 RX ADMIN — Medication 3 MILLILITER(S): at 14:23

## 2018-05-09 RX ADMIN — Medication 3 MILLILITER(S): at 00:17

## 2018-05-09 RX ADMIN — Medication 40 MILLIEQUIVALENT(S): at 11:36

## 2018-05-09 RX ADMIN — HEPARIN SODIUM 5000 UNIT(S): 5000 INJECTION INTRAVENOUS; SUBCUTANEOUS at 05:37

## 2018-05-09 RX ADMIN — Medication 1 TABLET(S): at 05:38

## 2018-05-09 RX ADMIN — CARVEDILOL PHOSPHATE 3.12 MILLIGRAM(S): 80 CAPSULE, EXTENDED RELEASE ORAL at 05:37

## 2018-05-09 RX ADMIN — SODIUM CHLORIDE 60 MILLILITER(S): 9 INJECTION, SOLUTION INTRAVENOUS at 03:31

## 2018-05-09 NOTE — CONSULT NOTE ADULT - PROBLEM SELECTOR RECOMMENDATION 9
Bronchodilators  oxygen supp prn
1- UA & CS.  2- Blood culture, and Follow to final report.  3- Insert Us catheter.  4- Continue IV Zosyn 3.375 gm IVPB q 12 hours.  5- Fluid and electrolytes management.  6- CBC and BMP follow up.   7- Surgical evaluation.

## 2018-05-09 NOTE — ED ADULT NURSE REASSESSMENT NOTE - NS ED NURSE REASSESS COMMENT FT1
Pt. is in stable condition. No complaints voiced. No signs of distress noted. Us catheter draining. Pt. is stable to go to [address] [place] [address] for continued care.

## 2018-05-09 NOTE — CONSULT NOTE ADULT - PROBLEM SELECTOR PROBLEM 1
COPD (chronic obstructive pulmonary disease)
Diverticulitis large intestine w/o perforation or abscess w/bleeding

## 2018-05-09 NOTE — PROGRESS NOTE ADULT - SUBJECTIVE AND OBJECTIVE BOX
89F from [facility] HTN, COPD, CVA w/ residual right sided weakness, HLD, Hyponatremia, CAD, Gastritis, mild cognitive impairment who comes to the hospital for failure to thrive and altered mental status. Note from the facility and history for family says that she has been having poor appetite for the past few days and is very lethargic and not talking. Note from facility also says that she had abdominal distension and they did a KUB with negative result. Patient was then transferred to the hospital for evaluation.  Labs done at nursing home also note a WBC of 12.73  CXR was negative for acute disease.     Review of Systems:  Review of Systems: Cannot be obtained at this time	      pt seen in ed [ x ], reg med floor [   ], bed [ x ], chair at bedside [   ], awake and responsive [ x ], lethargic [  ],  nad [ x ]        Allergies    Allergy Status Unknown        Vitals    T(F): 98.1 (05-09-18 @ 07:28), Max: 98.7 (05-09-18 @ 05:53)  HR: 77 (05-09-18 @ 07:28) (77 - 86)  BP: 135/65 (05-09-18 @ 07:28) (114/51 - 140/77)  RR: 17 (05-09-18 @ 07:28) (17 - 20)  SpO2: 97% (05-09-18 @ 07:28) (97% - 100%)  Wt(kg): --  CAPILLARY BLOOD GLUCOSE      POCT Blood Glucose.: 96 mg/dL (08 May 2018 14:15)      Labs                          11.5   12.3  )-----------( 180      ( 09 May 2018 06:39 )             36.8       05-09    150<H>  |  117<H>  |  36<H>  ----------------------------<  142<H>  3.2<L>   |  29  |  0.88    Ca    8.0<L>      09 May 2018 06:39  Phos  2.6     05-09  Mg     2.8     05-09    TPro  7.1  /  Alb  2.3<L>  /  TBili  0.3  /  DBili  x   /  AST  19  /  ALT  11  /  AlkPhos  52  05-08                Radiology Results      Meds    MEDICATIONS  (STANDING):  ALBUTerol/ipratropium for Nebulization 3 milliLiter(s) Nebulizer every 6 hours  ascorbic acid 500 milliGRAM(s) Oral daily  aspirin enteric coated 81 milliGRAM(s) Oral daily  bisacodyl Suppository 10 milliGRAM(s) Rectal daily  calcium carbonate 1250 mG (OsCal) 1 Tablet(s) Oral two times a day  carvedilol 3.125 milliGRAM(s) Oral every 12 hours  dextrose 5% + sodium chloride 0.45%. 1000 milliLiter(s) (60 mL/Hr) IV Continuous <Continuous>  docusate sodium 300 milliGRAM(s) Oral daily  famotidine    Tablet 20 milliGRAM(s) Oral daily  heparin  Injectable 5000 Unit(s) SubCutaneous every 12 hours  lactulose Syrup 20 Gram(s) Oral daily  multivitamin 1 Tablet(s) Oral daily  piperacillin/tazobactam IVPB. 3.375 Gram(s) IV Intermittent every 12 hours  polyethylene glycol 3350 17 Gram(s) Oral daily  potassium chloride   Powder 40 milliEquivalent(s) Oral once  senna 2 Tablet(s) Oral two times a day      MEDICATIONS  (PRN):      Physical Exam    Neuro :  no focal deficits  Respiratory: CTA B/L  CV: RRR, S1S2, no murmurs,   Abdominal: Soft, NT, ND +BS,  Extremities: No edema, + peripheral pulses    ASSESSMENT    Acute renal failure  CAD (coronary artery disease)  GERD (gastroesophageal reflux disease)  Dementia  CVA (cerebral vascular accident)  COPD (chronic obstructive pulmonary disease)  Heart failure, systolic  History of hypertension  No significant past surgical history      PLAN 89F from [facility] HTN, COPD, CVA w/ residual right sided weakness, HLD, Hyponatremia, CAD, Gastritis, mild cognitive impairment who comes to the hospital for failure to thrive and altered mental status. Note from the facility and history for family says that she has been having poor appetite for the past few days and is very lethargic and not talking. Note from facility also says that she had abdominal distension and they did a KUB with negative result. Patient was then transferred to the hospital for evaluation.  Labs done at nursing home also note a WBC of 12.73  CXR was negative for acute disease.     Review of Systems:  Review of Systems: Cannot be obtained at this time	      pt seen in ed [ x ], reg med floor [   ], bed [ x ], chair at bedside [   ], awake and responsive [ x ], lethargic [  ],  nad [ x ]    patrick [x]    Allergies    Allergy Status Unknown        Vitals    T(F): 98.1 (05-09-18 @ 07:28), Max: 98.7 (05-09-18 @ 05:53)  HR: 77 (05-09-18 @ 07:28) (77 - 86)  BP: 135/65 (05-09-18 @ 07:28) (114/51 - 140/77)  RR: 17 (05-09-18 @ 07:28) (17 - 20)  SpO2: 97% (05-09-18 @ 07:28) (97% - 100%)  Wt(kg): --  CAPILLARY BLOOD GLUCOSE      POCT Blood Glucose.: 96 mg/dL (08 May 2018 14:15)      Labs                          11.5   12.3  )-----------( 180      ( 09 May 2018 06:39 )             36.8       05-09    150<H>  |  117<H>  |  36<H>  ----------------------------<  142<H>  3.2<L>   |  29  |  0.88    Ca    8.0<L>      09 May 2018 06:39  Phos  2.6     05-09  Mg     2.8     05-09    TPro  7.1  /  Alb  2.3<L>  /  TBili  0.3  /  DBili  x   /  AST  19  /  ALT  11  /  AlkPhos  52  05-08                Radiology Results    < from: CT Head No Cont (05.08.18 @ 15:46) >  Impression: No acute intracranial hemorrhage. No CT evidence for an acute   territorial infarct.    Stablechronic small vessel ischemic disease and old infarct.    If clinically indicated, brain MR may be pursued for further evaluation.    < end of copied text >        < from: CT Abdomen and Pelvis No Cont (05.08.18 @ 17:33) >    IMPRESSION:  Sigmoid diverticulitis without abscess or perforation  Mild bilateral hydronephrosis may be due to distended bladder  Cholelithiasis    < end of copied text >        Meds    MEDICATIONS  (STANDING):  ALBUTerol/ipratropium for Nebulization 3 milliLiter(s) Nebulizer every 6 hours  ascorbic acid 500 milliGRAM(s) Oral daily  aspirin enteric coated 81 milliGRAM(s) Oral daily  bisacodyl Suppository 10 milliGRAM(s) Rectal daily  calcium carbonate 1250 mG (OsCal) 1 Tablet(s) Oral two times a day  carvedilol 3.125 milliGRAM(s) Oral every 12 hours  dextrose 5% + sodium chloride 0.45%. 1000 milliLiter(s) (60 mL/Hr) IV Continuous <Continuous>  docusate sodium 300 milliGRAM(s) Oral daily  famotidine    Tablet 20 milliGRAM(s) Oral daily  heparin  Injectable 5000 Unit(s) SubCutaneous every 12 hours  lactulose Syrup 20 Gram(s) Oral daily  multivitamin 1 Tablet(s) Oral daily  piperacillin/tazobactam IVPB. 3.375 Gram(s) IV Intermittent every 12 hours  polyethylene glycol 3350 17 Gram(s) Oral daily  potassium chloride   Powder 40 milliEquivalent(s) Oral once  senna 2 Tablet(s) Oral two times a day      MEDICATIONS  (PRN):      Physical Exam    Neuro :  right side weakness  Respiratory: CTA B/L  CV: RRR, S1S2, no murmurs,   Abdominal: Soft, ND +BS, left lower quadrant tender to palp  Extremities: No edema, + peripheral pulses    ASSESSMENT    encephalopathy likely 2nd to infectious process  diverticulitis  cholelithiasis  b/l hydronephrosis  Acute renal failure  h/o CAD (coronary artery disease)  GERD (gastroesophageal reflux disease)  Dementia  CVA (cerebral vascular accident)  COPD (chronic obstructive pulmonary disease)  Heart failure, systolic  History of hypertension  No significant past surgical history      PLAN    ct head neg for acute ischemia noted above  ct abd pelv with sigmoid diverticulitis, hydronephrosis and cholelithiasis noted above  cont zosyn  id f/u  f/u blood cx  f/u stool studies  surg cons  cont patrick  urology cons  cont ivf  pulm cons  pft outpt  cont current meds 89F from [facility] HTN, COPD, CVA w/ residual right sided weakness, HLD, Hyponatremia, CAD, Gastritis, mild cognitive impairment who comes to the hospital for failure to thrive and altered mental status. Note from the facility and history for family says that she has been having poor appetite for the past few days and is very lethargic and not talking. Note from facility also says that she had abdominal distension and they did a KUB with negative result. Patient was then transferred to the hospital for evaluation.  Labs done at nursing home also note a WBC of 12.73  CXR was negative for acute disease.     Review of Systems:  Review of Systems: Cannot be obtained at this time	      pt seen in ed [ x ], reg med floor [   ], bed [ x ], chair at bedside [   ], awake and responsive [ x ], lethargic [  ],  nad [ x ]    patrick [x]    Allergies    Allergy Status Unknown        Vitals    T(F): 98.1 (05-09-18 @ 07:28), Max: 98.7 (05-09-18 @ 05:53)  HR: 77 (05-09-18 @ 07:28) (77 - 86)  BP: 135/65 (05-09-18 @ 07:28) (114/51 - 140/77)  RR: 17 (05-09-18 @ 07:28) (17 - 20)  SpO2: 97% (05-09-18 @ 07:28) (97% - 100%)  Wt(kg): --  CAPILLARY BLOOD GLUCOSE      POCT Blood Glucose.: 96 mg/dL (08 May 2018 14:15)      Labs                          11.5   12.3  )-----------( 180      ( 09 May 2018 06:39 )             36.8       05-09    150<H>  |  117<H>  |  36<H>  ----------------------------<  142<H>  3.2<L>   |  29  |  0.88    Ca    8.0<L>      09 May 2018 06:39  Phos  2.6     05-09  Mg     2.8     05-09    TPro  7.1  /  Alb  2.3<L>  /  TBili  0.3  /  DBili  x   /  AST  19  /  ALT  11  /  AlkPhos  52  05-08                Radiology Results    < from: CT Head No Cont (05.08.18 @ 15:46) >  Impression: No acute intracranial hemorrhage. No CT evidence for an acute   territorial infarct.    Stablechronic small vessel ischemic disease and old infarct.    If clinically indicated, brain MR may be pursued for further evaluation.    < end of copied text >        < from: CT Abdomen and Pelvis No Cont (05.08.18 @ 17:33) >    IMPRESSION:  Sigmoid diverticulitis without abscess or perforation  Mild bilateral hydronephrosis may be due to distended bladder  Cholelithiasis    < end of copied text >        Meds    MEDICATIONS  (STANDING):  ALBUTerol/ipratropium for Nebulization 3 milliLiter(s) Nebulizer every 6 hours  ascorbic acid 500 milliGRAM(s) Oral daily  aspirin enteric coated 81 milliGRAM(s) Oral daily  bisacodyl Suppository 10 milliGRAM(s) Rectal daily  calcium carbonate 1250 mG (OsCal) 1 Tablet(s) Oral two times a day  carvedilol 3.125 milliGRAM(s) Oral every 12 hours  dextrose 5% + sodium chloride 0.45%. 1000 milliLiter(s) (60 mL/Hr) IV Continuous <Continuous>  docusate sodium 300 milliGRAM(s) Oral daily  famotidine    Tablet 20 milliGRAM(s) Oral daily  heparin  Injectable 5000 Unit(s) SubCutaneous every 12 hours  lactulose Syrup 20 Gram(s) Oral daily  multivitamin 1 Tablet(s) Oral daily  piperacillin/tazobactam IVPB. 3.375 Gram(s) IV Intermittent every 12 hours  polyethylene glycol 3350 17 Gram(s) Oral daily  potassium chloride   Powder 40 milliEquivalent(s) Oral once  senna 2 Tablet(s) Oral two times a day      MEDICATIONS  (PRN):      Physical Exam    Neuro :  right side weakness  Respiratory: CTA B/L  CV: RRR, S1S2, no murmurs,   Abdominal: Soft, ND +BS, left lower quadrant tender to palp  Extremities: No edema, + peripheral pulses    ASSESSMENT    encephalopathy likely 2nd to infectious process  diverticulitis  cholelithiasis  b/l hydronephrosis  Acute renal failure  electrolyte imbalance  h/o CAD (coronary artery disease)  GERD (gastroesophageal reflux disease)  Dementia  CVA (cerebral vascular accident)  COPD (chronic obstructive pulmonary disease)  Heart failure, systolic  History of hypertension  No significant past surgical history      PLAN    ct head neg for acute ischemia noted above  ct abd pelv with sigmoid diverticulitis, hydronephrosis and cholelithiasis noted above  cont zosyn  id f/u  f/u blood cx  f/u stool studies  surg cons  cont patrick  urology cons  cont ivf  supplement electrolytes  pulm cons  pft outpt  cont current meds

## 2018-05-09 NOTE — CONSULT NOTE ADULT - SUBJECTIVE AND OBJECTIVE BOX
PULMONARY CONSULT NOTE      NANI BUSBY  MRN-[id number]    Patient is a 89y old  Female who presents with a chief complaint of failure to thrive (08 May 2018 18:14)      History of Present Illness:  Reason for Admission: failure to thrive	  History of Present Illness: 	  89F from [facility] HTN, COPD, CVA w/ residual right sided weakness, HLD, Hyponatremia, CAD, Gastritis, mild cognitive impairment who comes to the hospital for failure to thrive and altered mental status. Note from the facility and history for family says that she has been having poor appetite for the past few days and is very lethargic and not talking. Note from facility also says that she had abdominal distension and they did a KUB with negative result. Patient was then transferred to the hospital for evaluation.  Labs done at nursing home also note a WBC of 12.73  CXR was negative for acute disease.       HISTORY OF PRESENT ILLNESS: As above. Awake, alert, comfortable in bed in NAD    MEDICATIONS  (STANDING):  ALBUTerol/ipratropium for Nebulization 3 milliLiter(s) Nebulizer every 6 hours  ascorbic acid 500 milliGRAM(s) Oral daily  aspirin enteric coated 81 milliGRAM(s) Oral daily  bisacodyl Suppository 10 milliGRAM(s) Rectal daily  calcium carbonate 1250 mG (OsCal) 1 Tablet(s) Oral two times a day  carvedilol 3.125 milliGRAM(s) Oral every 12 hours  dextrose 5% + sodium chloride 0.45%. 1000 milliLiter(s) (60 mL/Hr) IV Continuous <Continuous>  docusate sodium 300 milliGRAM(s) Oral daily  famotidine    Tablet 20 milliGRAM(s) Oral daily  heparin  Injectable 5000 Unit(s) SubCutaneous every 12 hours  lactulose Syrup 20 Gram(s) Oral daily  multivitamin 1 Tablet(s) Oral daily  piperacillin/tazobactam IVPB. 3.375 Gram(s) IV Intermittent every 12 hours  polyethylene glycol 3350 17 Gram(s) Oral daily  senna 2 Tablet(s) Oral two times a day      MEDICATIONS  (PRN):      Allergies    Allergy Status Unknown    Intolerances        PAST MEDICAL & SURGICAL HISTORY:  CAD (coronary artery disease)  GERD (gastroesophageal reflux disease)  CVA (cerebral vascular accident)  COPD (chronic obstructive pulmonary disease)  History of hypertension  No significant past surgical history      FAMILY HISTORY:  No pertinent family history in first degree relatives      SOCIAL HISTORY  Smoking History:     REVIEW OF SYSTEMS:    CONSTITUTIONAL:  No fevers, chills, sweats    HEENT:  Eyes:  No diplopia or blurred vision. ENT:  No earache, sore throat or runny nose.    CARDIOVASCULAR:  No pressure, squeezing, tightness, or heaviness about the chest; no palpitations.    RESPIRATORY:  Per HPI    GASTROINTESTINAL:  No abdominal pain, nausea, vomiting or diarrhea.    GENITOURINARY:  No dysuria, frequency or urgency.    NEUROLOGIC:  No paresthesias, fasciculations, seizures or weakness.    PSYCHIATRIC:  No disorder of thought or mood.    Vital Signs Last 24 Hrs  T(C): 36.7 (09 May 2018 11:30), Max: 37.1 (09 May 2018 05:53)  T(F): 98 (09 May 2018 11:30), Max: 98.7 (09 May 2018 05:53)  HR: 79 (09 May 2018 11:30) (77 - 86)  BP: 131/56 (09 May 2018 11:30) (114/51 - 140/77)  BP(mean): --  RR: 17 (09 May 2018 11:30) (17 - 20)  SpO2: 97% (09 May 2018 11:30) (97% - 100%)  I&O's Detail    08 May 2018 07:01  -  09 May 2018 07:00  --------------------------------------------------------  IN:  Total IN: 0 mL    OUT:    Indwelling Catheter - Urethral: 1400 mL  Total OUT: 1400 mL    Total NET: -1400 mL          PHYSICAL EXAMINATION:    GENERAL: The patient is a well-developed, well-nourished _____in no apparent distress.     HEENT: Head is normocephalic and atraumatic. Extraocular muscles are intact. Mucous membranes are moist.     NECK: Supple.     LUNGS: Clear to auscultation without wheezing, rales, or rhonchi. Respirations unlabored    HEART: Regular rate and rhythm without murmur.    ABDOMEN: Soft, nontender, and nondistended.  No hepatosplenomegaly is noted.    EXTREMITIES: Without any cyanosis, clubbing, rash, lesions or edema.    NEUROLOGIC: Grossly intact.      LABS:                        11.5   12.3  )-----------( 180      ( 09 May 2018 06:39 )             36.8         150<H>  |  117<H>  |  36<H>  ----------------------------<  142<H>  3.2<L>   |  29  |  0.88    Ca    8.0<L>      09 May 2018 06:39  Phos  2.6       Mg     2.8         TPro  7.1  /  Alb  2.3<L>  /  TBili  0.3  /  DBili  x   /  AST  19  /  ALT  11  /  AlkPhos  52        Urinalysis Basic - ( 08 May 2018 17:44 )    Color: Yellow / Appearance: Clear / S.010 / pH: x  Gluc: x / Ketone: Negative  / Bili: Negative / Urobili: Negative   Blood: x / Protein: 15 / Nitrite: Negative   Leuk Esterase: Trace / RBC: 10-25 /HPF / WBC 3-5 /HPF   Sq Epi: x / Non Sq Epi: Few /HPF / Bacteria: Few /HPF                      MICROBIOLOGY:    RADIOLOGY & ADDITIONAL STUDIES:    < from: CT Abdomen and Pelvis No Cont (18 @ 17:33) >  The lower chest demonstrates areas of linear scarring in the lingula and   right lower lobe.    The evaluation of theabdominal viscera and the bowel is limited without   contrast.    The liver is unremarkable. Cholelithiasis is seen.    The spleen, pancreas and adrenal glands are grossly unremarkable.    There is mild bilateral hydronephrosis or urinary calculus. There is a   small cyst in the lateral midpole of the right kidney. The bladder is   unremarkable    There is no bowel obstruction.  There is no intraperitoneal free air.    There is no free fluid.The appendix is seen. There is colonic   diverticulosis. There is stranding in the fat surrounding the sigmoid   colon in the lower abdomen in the midline suggestive of diverticulitis   without evidence of abscess or perforation    There is no abdominal or pelvic lymphadenopathy.  The pelvic structures   are grossly unremarkable.    The aorta is not aneurysmal. There is no significant retroperitoneal or   pelvic lymphadenopathy. Pelvic structures are remarkable for   calcifications in the uterus which may represent fibroids..    The bony structures demonstrate degenerative changes of the spine with   levoscoliosis of the lumbar spine.    IMPRESSION:  Sigmoid diverticulitis without abscess or perforation  Mild bilateral hydronephrosis may be due to distended bladder  Cholelithiasis    < end of copied text >  CXR:    Ct scan chest:    ekg;    echo:

## 2018-05-09 NOTE — PROGRESS NOTE ADULT - SUBJECTIVE AND OBJECTIVE BOX
Meds:  piperacillin/tazobactam IVPB. 3.375 Gram(s) IV Intermittent every 12 hours    Allergies:  Allergies    Allergy Status Unknown    Intolerances        ROS  [  ] UNABLE TO ELICIT {very poor historian}    General:  [  ] None  [  ] Fever  [  ] Chills  [ x ] Malaise    Skin:  [ x ] None [  ] Rash  [  ] Wound  [  ] Ulcer    HEENT:  [ x ] None  [  ] Sore Throat  [  ] Nasal congestion/ runny nose  [  ] Photophobia [  ] Neck pain      Chest:  [  ] None   [  ] SOB  [ x ] Cough  [  ] None    Cardiovascular:   [ x ] None  [  ] CP  [  ] Palpitation    Gastrointestinal:  [ x ] None  [  ] Abd pain   [  ] Nausea    [  ] Vomiting   [  ] Diarrhea	     Genitourinary:  [ x ] None [  ] Polyuria   [  ] Urgency  [  ] Frequency  [  ] Dysuria    [  ]  Hematuria       Musculoskeletal:  [  ] None [  ] Back Pain	[  ] Body aches  [  ] Joint pain  [ x ] Weakness    Neurological: [  ] None [  ]Dizziness  [  ]Visual Disturbance  [  ]Headaches   [ x ] Weakness        PHYSICAL EXAM:    Vital Signs Last 24 Hrs  T(C): 36.7 (09 May 2018 11:30), Max: 37.1 (09 May 2018 05:53)  T(F): 98 (09 May 2018 11:30), Max: 98.7 (09 May 2018 05:53)  HR: 79 (09 May 2018 11:30) (77 - 86)  BP: 131/56 (09 May 2018 11:30) (114/51 - 135/65)  BP(mean): --  RR: 17 (09 May 2018 11:30) (17 - 20)  SpO2: 97% (09 May 2018 11:30) (97% - 100%)    Constitutional:    HEENT: [ x ] Wnl  [  ] Pharyngeal congestion    Neck:  [ x ] Supple  [  ]Lymphadenopathy  [ x ] No JVD   [  ] JVD  [  ] Masses   [  ] WNL    CHEST/Respiratory:  [  ]Clear to auscultation  [ x ] Rales   [  ] Rhonchi   [  ] Wheezing     [  ] Chest Tenderness      Cardiovascular:  [ x ] Reg S1 S2   [  ] Irreg S1 S2   [ x ]No Murmur  [  ] +ve Murmurs  [  ]Systolic [  ]Diastolic      Abdomen:  [ x ] Soft  [ x ] No tendrerness  [  ] Tenderness  [  ] Organomegaly  [  ] ABD Distention  [  ] Rigidity                       [ x ] No Regidity                       [ x ] No Rebound Tenderness  [  ] No Guarding Rigidity  [  ] Rebound Tenderness[  ] Guarding Rigidity                          [ x ]  +ve Bowel Sounds  [  ] Decreased Bowel Sounds    [  ] Absent Bowel Sounds  [ x ] Us catheter in place                          Extremities: [ x ] No edema [  ] Edema  [  ] Clubbing   [  ] Cyanosis                         [ x ] No Tender Calf muscles  [  ] Tender Calf muscles                        [ x ] Palpable peripheral pulses [ x ] Contracted right hand    Neurological: [ x ] Awake  [ x ] Alert  [ x ] Oriented  x  2                           [  ] Confused  [  ] Drowsy  [  ] respond to painful stimuli  [  ] Unresponsive    Skin:  [ x ] Intact [  ] Redness [  ] Thrombophlebitis  [  ] Rashes  [  ] Dry  [  ] Ulcers    Ortho:  [  ] Joint Swelling  [  ] Joint erythema [  ] Joint tenderness                [  ] Increased temp. to touch  [ x ] DJD [  ] WNL          LABS/DIAGNOSTIC TESTS                          11.5   12.3  )-----------( 180      ( 09 May 2018 06:39 )             36.8         05-09    150<H>  |  117<H>  |  36<H>  ----------------------------<  142<H>  3.2<L>   |  29  |  0.88    Ca    8.0<L>      09 May 2018 06:39  Phos  2.6     05-09  Mg     2.8     05-09    TPro  7.1  /  Alb  2.3<L>  /  TBili  0.3  /  DBili  x   /  AST  19  /  ALT  11  /  AlkPhos  52  05-08      LIVER FUNCTIONS - ( 08 May 2018 15:20 )  Alb: 2.3 g/dL / Pro: 7.1 g/dL / ALK PHOS: 52 U/L / ALT: 11 U/L DA / AST: 19 U/L / GGT: x               CULTURES:   None yet.    RADIOLOGY      EXAM:  CT ABDOMEN AND PELVIS                            PROCEDURE DATE:  05/08/2018          INTERPRETATION:  CLINICAL STATEMENT: abd pain, distension, r/o volvulus    TECHNIQUE: CT of the abdomen and pelvis was performed in the axial plane   utilizing thin slices without IV or oral contrast. Sagittal and coronal   reformatting was performed.    COMPARISON: None.    FINDINGS:    The lower chest demonstrates areas of linear scarring in the lingula and   right lower lobe.    The evaluation of theabdominal viscera and the bowel is limited without   contrast.    The liver is unremarkable. Cholelithiasis is seen.    The spleen, pancreas and adrenal glands are grossly unremarkable.    There is mild bilateral hydronephrosis or urinary calculus. There is a   small cyst in the lateral midpole of the right kidney. The bladder is   unremarkable    There is no bowel obstruction.  There is no intraperitoneal free air.    There is no free fluid.The appendix is seen. There is colonic   diverticulosis. There is stranding in the fat surrounding the sigmoid   colon in the lower abdomen in the midline suggestive of diverticulitis   without evidence of abscess or perforation    There is no abdominal or pelvic lymphadenopathy.  The pelvic structures   are grossly unremarkable.    The aorta is not aneurysmal. There is no significant retroperitoneal or   pelvic lymphadenopathy. Pelvic structures are remarkable for   calcifications in the uterus which may represent fibroids..    The bony structures demonstrate degenerative changes of the spine with   levoscoliosis of the lumbar spine.    IMPRESSION:  Sigmoid diverticulitis without abscess or perforation  Mild bilateral hydronephrosis may be due to distended bladder  Cholelithiasis        EXAM:  CT BRAIN                            PROCEDURE DATE:  05/08/2018          INTERPRETATION:  Head CT without IV contrast     Clinical Indication: Altered mental status    Technique: Axial CT images of the head are obtained from the skull base   to the vertex without IV contrast.    Comparison: 10/27/2017.    Findings: There is no acute intracranial hemorrhage. No CT evidence for   an acute territorial infarct. Stable patchy hypodensities in the   periventricular and subcortical white matterbilaterally, compatible with   chronic small vessel ischemic disease. Stable encephalomalacia in the   right cerebellum, compatible with an old infarct. There is no   space-occupying lesion, midline shift, or herniation. The ventricles   maintain normal size, shape, and location. No extra-axial fluid   collection.  The visualized paranasal sinuses and orbits appear grossly   unremarkable.    Impression: No acute intracranial hemorrhage. No CT evidence for an acute   territorial infarct.    Stable chronic small vessel ischemic disease and old infarct.    If clinically indicated, brain MR may be pursued for further evaluation.        Assessment and Recommendation:   89 y/o F pt with PMHx of systolic failure, HTN, COPD, and CVA BIB EMS from nursing home ([facility]) for AMS x 1 week and cough x 2 weeks. Per family, pt has been acting confused, lethargic, and has not been eating. Pt denies chest pain, difficulty breathing, abd pain, vomiting or any other complaints.  CT of abdomen showed Sigmoid diverticulitis without abscess or perforation, Mild bilateral hydronephrosis may be due to distended bladder, and Cholelithiasis      Problem/Recommendation - 1:  Problem: Diverticulitis large intestine w/o perforation or abscess w/bleeding. Recommendation: 1- UA & CS.  2- Blood culture, and Follow to final report.  3- Urine culture.  4- Continue IV Zosyn 3.375 gm IVPB q 12 hours.  5- Fluid and electrolytes management.  6- CBC and BMP follow up.     Problem/Recommendation - 2:  ·  Problem: Acute renal failure, unspecified acute renal failure type.    Recommendation:   1- As per problem # 1.  2- Urology management and follow up.     Problem/Recommendation - 3:  ·  Problem: Hydronephrosis, unspecified hydronephrosis type.    Recommendation:   1- As per problem # 1 & # 2.     Problem/Recommendation - 4:  ·  Problem: COPD (chronic obstructive pulmonary disease).    Recommendation:   1- Bronchodilators.  2- O2 as needed.  3- Pulmonary management, and follow up.  4- Steroids as per primary, and pulmonary team if needed.     Problem/Recommendation - 5:  ·  Problem: HTN (hypertension).    Recommendation:   1- Monitor Blood pressure closely.  2- Blood pressure control.  3- BP. meds as per cardiology and primary care team.     Discussed with NP in ER.

## 2018-05-09 NOTE — CONSULT NOTE ADULT - PROBLEM SELECTOR RECOMMENDATION 4
likely related to dehydration  gentle hydration  monitor labs
1- Bronchodilators.  2- O2 as needed.  3- Pulmonary evaluation and management.  4- Steroids as per primary, and pulmonary team if needed.

## 2018-05-10 LAB
ANION GAP SERPL CALC-SCNC: 5 MMOL/L — SIGNIFICANT CHANGE UP (ref 5–17)
ANION GAP SERPL CALC-SCNC: 6 MMOL/L — SIGNIFICANT CHANGE UP (ref 5–17)
BUN SERPL-MCNC: 10 MG/DL — SIGNIFICANT CHANGE UP (ref 7–18)
BUN SERPL-MCNC: 13 MG/DL — SIGNIFICANT CHANGE UP (ref 7–18)
CALCIUM SERPL-MCNC: 8.1 MG/DL — LOW (ref 8.4–10.5)
CALCIUM SERPL-MCNC: 8.2 MG/DL — LOW (ref 8.4–10.5)
CHLORIDE SERPL-SCNC: 111 MMOL/L — HIGH (ref 96–108)
CHLORIDE SERPL-SCNC: 112 MMOL/L — HIGH (ref 96–108)
CO2 SERPL-SCNC: 29 MMOL/L — SIGNIFICANT CHANGE UP (ref 22–31)
CO2 SERPL-SCNC: 30 MMOL/L — SIGNIFICANT CHANGE UP (ref 22–31)
CREAT SERPL-MCNC: 0.44 MG/DL — LOW (ref 0.5–1.3)
CREAT SERPL-MCNC: 0.5 MG/DL — SIGNIFICANT CHANGE UP (ref 0.5–1.3)
GLUCOSE SERPL-MCNC: 105 MG/DL — HIGH (ref 70–99)
GLUCOSE SERPL-MCNC: 93 MG/DL — SIGNIFICANT CHANGE UP (ref 70–99)
HCT VFR BLD CALC: 35.1 % — SIGNIFICANT CHANGE UP (ref 34.5–45)
HGB BLD-MCNC: 11.2 G/DL — LOW (ref 11.5–15.5)
MAGNESIUM SERPL-MCNC: 1.9 MG/DL — SIGNIFICANT CHANGE UP (ref 1.6–2.6)
MCHC RBC-ENTMCNC: 27.8 PG — SIGNIFICANT CHANGE UP (ref 27–34)
MCHC RBC-ENTMCNC: 31.9 GM/DL — LOW (ref 32–36)
MCV RBC AUTO: 87.1 FL — SIGNIFICANT CHANGE UP (ref 80–100)
PLATELET # BLD AUTO: 198 K/UL — SIGNIFICANT CHANGE UP (ref 150–400)
POTASSIUM SERPL-MCNC: 2.7 MMOL/L — CRITICAL LOW (ref 3.5–5.3)
POTASSIUM SERPL-MCNC: 3 MMOL/L — LOW (ref 3.5–5.3)
POTASSIUM SERPL-SCNC: 2.7 MMOL/L — CRITICAL LOW (ref 3.5–5.3)
POTASSIUM SERPL-SCNC: 3 MMOL/L — LOW (ref 3.5–5.3)
RBC # BLD: 4.04 M/UL — SIGNIFICANT CHANGE UP (ref 3.8–5.2)
RBC # FLD: 12.7 % — SIGNIFICANT CHANGE UP (ref 10.3–14.5)
SODIUM SERPL-SCNC: 146 MMOL/L — HIGH (ref 135–145)
SODIUM SERPL-SCNC: 147 MMOL/L — HIGH (ref 135–145)
WBC # BLD: 11.5 K/UL — HIGH (ref 3.8–10.5)
WBC # FLD AUTO: 11.5 K/UL — HIGH (ref 3.8–10.5)

## 2018-05-10 PROCEDURE — 76775 US EXAM ABDO BACK WALL LIM: CPT | Mod: 26

## 2018-05-10 PROCEDURE — 74018 RADEX ABDOMEN 1 VIEW: CPT | Mod: 26

## 2018-05-10 RX ORDER — POTASSIUM CHLORIDE 20 MEQ
20 PACKET (EA) ORAL
Qty: 0 | Refills: 0 | Status: DISCONTINUED | OUTPATIENT
Start: 2018-05-10 | End: 2018-05-10

## 2018-05-10 RX ORDER — DEXTROSE MONOHYDRATE, SODIUM CHLORIDE, AND POTASSIUM CHLORIDE 50; .745; 4.5 G/1000ML; G/1000ML; G/1000ML
1000 INJECTION, SOLUTION INTRAVENOUS
Qty: 0 | Refills: 0 | Status: DISCONTINUED | OUTPATIENT
Start: 2018-05-10 | End: 2018-05-10

## 2018-05-10 RX ORDER — POTASSIUM CHLORIDE 20 MEQ
10 PACKET (EA) ORAL
Qty: 0 | Refills: 0 | Status: COMPLETED | OUTPATIENT
Start: 2018-05-10 | End: 2018-05-10

## 2018-05-10 RX ORDER — DEXTROSE MONOHYDRATE, SODIUM CHLORIDE, AND POTASSIUM CHLORIDE 50; .745; 4.5 G/1000ML; G/1000ML; G/1000ML
1000 INJECTION, SOLUTION INTRAVENOUS
Qty: 0 | Refills: 0 | Status: DISCONTINUED | OUTPATIENT
Start: 2018-05-10 | End: 2018-05-11

## 2018-05-10 RX ADMIN — Medication 100 MILLIEQUIVALENT(S): at 21:50

## 2018-05-10 RX ADMIN — Medication 3 MILLILITER(S): at 20:30

## 2018-05-10 RX ADMIN — Medication 100 MILLIEQUIVALENT(S): at 22:18

## 2018-05-10 RX ADMIN — PIPERACILLIN AND TAZOBACTAM 25 GRAM(S): 4; .5 INJECTION, POWDER, LYOPHILIZED, FOR SOLUTION INTRAVENOUS at 05:14

## 2018-05-10 RX ADMIN — Medication 1 TABLET(S): at 05:45

## 2018-05-10 RX ADMIN — CARVEDILOL PHOSPHATE 3.12 MILLIGRAM(S): 80 CAPSULE, EXTENDED RELEASE ORAL at 05:45

## 2018-05-10 RX ADMIN — SENNA PLUS 2 TABLET(S): 8.6 TABLET ORAL at 05:45

## 2018-05-10 RX ADMIN — Medication 10 MILLIGRAM(S): at 12:00

## 2018-05-10 RX ADMIN — Medication 100 MILLIEQUIVALENT(S): at 13:05

## 2018-05-10 RX ADMIN — Medication 100 MILLIEQUIVALENT(S): at 18:29

## 2018-05-10 RX ADMIN — HEPARIN SODIUM 5000 UNIT(S): 5000 INJECTION INTRAVENOUS; SUBCUTANEOUS at 05:45

## 2018-05-10 RX ADMIN — Medication 100 MILLIEQUIVALENT(S): at 15:08

## 2018-05-10 RX ADMIN — HEPARIN SODIUM 5000 UNIT(S): 5000 INJECTION INTRAVENOUS; SUBCUTANEOUS at 18:31

## 2018-05-10 RX ADMIN — Medication 100 MILLIEQUIVALENT(S): at 19:50

## 2018-05-10 RX ADMIN — DEXTROSE MONOHYDRATE, SODIUM CHLORIDE, AND POTASSIUM CHLORIDE 75 MILLILITER(S): 50; .745; 4.5 INJECTION, SOLUTION INTRAVENOUS at 18:34

## 2018-05-10 RX ADMIN — PIPERACILLIN AND TAZOBACTAM 25 GRAM(S): 4; .5 INJECTION, POWDER, LYOPHILIZED, FOR SOLUTION INTRAVENOUS at 18:34

## 2018-05-10 RX ADMIN — SODIUM CHLORIDE 60 MILLILITER(S): 9 INJECTION, SOLUTION INTRAVENOUS at 13:08

## 2018-05-10 RX ADMIN — Medication 3 MILLILITER(S): at 02:10

## 2018-05-10 NOTE — PROGRESS NOTE ADULT - SUBJECTIVE AND OBJECTIVE BOX
Patient is a 89y old  Female who presents with a chief complaint of failure to thrive (08 May 2018 18:14  Patient is resting comfortable in bed in NAD. No cough or sob    INTERVAL HPI/OVERNIGHT EVENTS:      VITAL SIGNS:  T(F): 98.2 (05-10-18 @ 05:30)  HR: 75 (05-10-18 @ 05:30)  BP: 132/75 (05-10-18 @ 05:30)  RR: 16 (05-10-18 @ 05:30)  SpO2: 96% (05-10-18 @ 05:30)  Wt(kg): --  I&O's Detail    09 May 2018 07:01  -  10 May 2018 07:00  --------------------------------------------------------  IN:  Total IN: 0 mL    OUT:    Indwelling Catheter - Urethral: 850 mL  Total OUT: 850 mL    Total NET: -850 mL              REVIEW OF SYSTEMS:    CONSTITUTIONAL:  No fevers, chills, sweats    HEENT:  Eyes:  No diplopia or blurred vision. ENT:  No earache, sore throat or runny nose.    CARDIOVASCULAR:  No pressure, squeezing, tightness, or heaviness about the chest; no palpitations.    RESPIRATORY:  Per HPI    GASTROINTESTINAL:  No abdominal pain, nausea, vomiting or diarrhea.    GENITOURINARY:  No dysuria, frequency or urgency.    NEUROLOGIC:  No paresthesias, fasciculations, seizures or weakness.    PSYCHIATRIC:  No disorder of thought or mood.      PHYSICAL EXAM:    Constitutional: Well developed and nourished  Eyes:Perrla  ENMT: normal  Neck:supple  Respiratory: good air entry  Cardiovascular: S1 S2 regular  Gastrointestinal: Soft, Mildly distended, non tender  Extremities: No edema  Vascular:normal  Neurological:Awake, alert,Ox3  Musculoskeletal:Normal      MEDICATIONS  (STANDING):  ALBUTerol/ipratropium for Nebulization 3 milliLiter(s) Nebulizer every 6 hours  ascorbic acid 500 milliGRAM(s) Oral daily  aspirin enteric coated 81 milliGRAM(s) Oral daily  bisacodyl Suppository 10 milliGRAM(s) Rectal daily  calcium carbonate 1250 mG (OsCal) 1 Tablet(s) Oral two times a day  carvedilol 3.125 milliGRAM(s) Oral every 12 hours  dextrose 5% + sodium chloride 0.45%. 1000 milliLiter(s) (60 mL/Hr) IV Continuous <Continuous>  docusate sodium 300 milliGRAM(s) Oral daily  famotidine    Tablet 20 milliGRAM(s) Oral daily  heparin  Injectable 5000 Unit(s) SubCutaneous every 12 hours  lactulose Syrup 20 Gram(s) Oral daily  multivitamin 1 Tablet(s) Oral daily  piperacillin/tazobactam IVPB. 3.375 Gram(s) IV Intermittent every 12 hours  polyethylene glycol 3350 17 Gram(s) Oral daily  potassium chloride  10 mEq/100 mL IVPB 10 milliEquivalent(s) IV Intermittent every 1 hour  senna 2 Tablet(s) Oral two times a day    MEDICATIONS  (PRN):      Allergies    Allergy Status Unknown    Intolerances        LABS:                        11.2   11.5  )-----------( 198      ( 10 May 2018 07:17 )             35.1     05-10    147<H>  |  112<H>  |  13  ----------------------------<  105<H>  2.7<LL>   |  29  |  0.50    Ca    8.1<L>      10 May 2018 07:17  Phos  2.6     05-09  Mg     1.9     05-10    TPro  7.1  /  Alb  2.3<L>  /  TBili  0.3  /  DBili  x   /  AST  19  /  ALT  11  /  AlkPhos  52  05-08      Urinalysis Basic - ( 08 May 2018 17:44 )    Color: Yellow / Appearance: Clear / S.010 / pH: x  Gluc: x / Ketone: Negative  / Bili: Negative / Urobili: Negative   Blood: x / Protein: 15 / Nitrite: Negative   Leuk Esterase: Trace / RBC: 10-25 /HPF / WBC 3-5 /HPF   Sq Epi: x / Non Sq Epi: Few /HPF / Bacteria: Few /HPF            CAPILLARY BLOOD GLUCOSE            RADIOLOGY & ADDITIONAL TESTS:  < from: CT Abdomen and Pelvis No Cont (18 @ 17:33) >  IMPRESSION:  Sigmoid diverticulitis without abscess or perforation  Mild bilateral hydronephrosis may be due to distended bladder  Cholelithiasis    < end of copied text >    CXR:    Ct scan chest:    ekg;    echo:

## 2018-05-10 NOTE — PROGRESS NOTE ADULT - SUBJECTIVE AND OBJECTIVE BOX
Meds:  piperacillin/tazobactam IVPB. 3.375 Gram(s) IV Intermittent every 12 hours    Allergies:  Allergies    Allergy Status Unknown    Intolerances        ROS  [  ] UNABLE TO ELICIT {very poor historian}    General:  [  ] None  [  ] Fever  [  ] Chills  [ x ] Malaise    Skin:  [ x ] None [  ] Rash  [  ] Wound  [  ] Ulcer    HEENT:  [ x ] None  [  ] Sore Throat  [  ] Nasal congestion/ runny nose  [  ] Photophobia [  ] Neck pain      Chest:  [  ] None   [  ] SOB  [ x ] Cough  [  ] None    Cardiovascular:   [ x ] None  [  ] CP  [  ] Palpitation    Gastrointestinal:  [ x ] None  [  ] Abd pain   [  ] Nausea    [  ] Vomiting   [  ] Diarrhea	     Genitourinary:  [ x ] None [  ] Polyuria   [  ] Urgency  [  ] Frequency  [  ] Dysuria    [  ]  Hematuria       Musculoskeletal:  [  ] None [  ] Back Pain	[  ] Body aches  [  ] Joint pain  [ x ] Weakness    Neurological: [  ] None [  ]Dizziness  [  ]Visual Disturbance  [  ]Headaches   [ x ] Weakness        PHYSICAL EXAM:  Vital Signs Last 24 Hrs  T(C): 36.8 (10 May 2018 05:30), Max: 37.3 (09 May 2018 22:33)  T(F): 98.2 (10 May 2018 05:30), Max: 99.2 (09 May 2018 22:33)  HR: 75 (10 May 2018 05:30) (75 - 84)  BP: 132/75 (10 May 2018 05:30) (131/56 - 151/77)  BP(mean): --  RR: 16 (10 May 2018 05:30) (16 - 18)  SpO2: 96% (10 May 2018 05:30) (95% - 97%)    Constitutional:    HEENT: [ x ] Wnl  [  ] Pharyngeal congestion    Neck:  [ x ] Supple  [  ]Lymphadenopathy  [ x ] No JVD   [  ] JVD  [  ] Masses   [  ] WNL    CHEST/Respiratory:  [  ]Clear to auscultation  [ x ] Rales   [  ] Rhonchi   [  ] Wheezing     [  ] Chest Tenderness      Cardiovascular:  [ x ] Reg S1 S2   [  ] Irreg S1 S2   [ x ]No Murmur  [  ] +ve Murmurs  [  ]Systolic [  ]Diastolic      Abdomen:  [ x ] Soft  [ x ] No tendrerness  [  ] Tenderness  [  ] Organomegaly  [  ] ABD Distention  [  ] Rigidity                       [ x ] No Regidity                       [ x ] No Rebound Tenderness  [  ] No Guarding Rigidity  [  ] Rebound Tenderness[  ] Guarding Rigidity                          [ x ]  +ve Bowel Sounds  [  ] Decreased Bowel Sounds    [  ] Absent Bowel Sounds  [ x ] Us catheter in place                          Extremities: [ x ] No edema [  ] Edema  [  ] Clubbing   [  ] Cyanosis                         [ x ] No Tender Calf muscles  [  ] Tender Calf muscles                        [ x ] Palpable peripheral pulses [ x ] Contracted right hand    Neurological: [ x ] Awake  [ x ] Alert  [ x ] Oriented  x  2                           [  ] Confused  [  ] Drowsy  [  ] respond to painful stimuli  [  ] Unresponsive    Skin:  [ x ] Intact [  ] Redness [  ] Thrombophlebitis  [  ] Rashes  [  ] Dry  [  ] Ulcers    Ortho:  [  ] Joint Swelling  [  ] Joint erythema [  ] Joint tenderness                [  ] Increased temp. to touch  [ x ] DJD [  ] WNL          LABS/DIAGNOSTIC TESTS                          11.5   12.3  )-----------( 180      ( 09 May 2018 06:39 )             36.8         05-09    150<H>  |  117<H>  |  36<H>  ----------------------------<  142<H>  3.2<L>   |  29  |  0.88    Ca    8.0<L>      09 May 2018 06:39  Phos  2.6     05-09  Mg     2.8     05-09    TPro  7.1  /  Alb  2.3<L>  /  TBili  0.3  /  DBili  x   /  AST  19  /  ALT  11  /  AlkPhos  52  05-08      LIVER FUNCTIONS - ( 08 May 2018 15:20 )  Alb: 2.3 g/dL / Pro: 7.1 g/dL / ALK PHOS: 52 U/L / ALT: 11 U/L DA / AST: 19 U/L / GGT: x               CULTURES:   None yet.    RADIOLOGY      EXAM:  CT ABDOMEN AND PELVIS                            PROCEDURE DATE:  05/08/2018          INTERPRETATION:  CLINICAL STATEMENT: abd pain, distension, r/o volvulus    TECHNIQUE: CT of the abdomen and pelvis was performed in the axial plane   utilizing thin slices without IV or oral contrast. Sagittal and coronal   reformatting was performed.    COMPARISON: None.    FINDINGS:    The lower chest demonstrates areas of linear scarring in the lingula and   right lower lobe.    The evaluation of theabdominal viscera and the bowel is limited without   contrast.    The liver is unremarkable. Cholelithiasis is seen.    The spleen, pancreas and adrenal glands are grossly unremarkable.    There is mild bilateral hydronephrosis or urinary calculus. There is a   small cyst in the lateral midpole of the right kidney. The bladder is   unremarkable    There is no bowel obstruction.  There is no intraperitoneal free air.    There is no free fluid.The appendix is seen. There is colonic   diverticulosis. There is stranding in the fat surrounding the sigmoid   colon in the lower abdomen in the midline suggestive of diverticulitis   without evidence of abscess or perforation    There is no abdominal or pelvic lymphadenopathy.  The pelvic structures   are grossly unremarkable.    The aorta is not aneurysmal. There is no significant retroperitoneal or   pelvic lymphadenopathy. Pelvic structures are remarkable for   calcifications in the uterus which may represent fibroids..    The bony structures demonstrate degenerative changes of the spine with   levoscoliosis of the lumbar spine.    IMPRESSION:  Sigmoid diverticulitis without abscess or perforation  Mild bilateral hydronephrosis may be due to distended bladder  Cholelithiasis        EXAM:  CT BRAIN                            PROCEDURE DATE:  05/08/2018          INTERPRETATION:  Head CT without IV contrast     Clinical Indication: Altered mental status    Technique: Axial CT images of the head are obtained from the skull base   to the vertex without IV contrast.    Comparison: 10/27/2017.    Findings: There is no acute intracranial hemorrhage. No CT evidence for   an acute territorial infarct. Stable patchy hypodensities in the   periventricular and subcortical white matterbilaterally, compatible with   chronic small vessel ischemic disease. Stable encephalomalacia in the   right cerebellum, compatible with an old infarct. There is no   space-occupying lesion, midline shift, or herniation. The ventricles   maintain normal size, shape, and location. No extra-axial fluid   collection.  The visualized paranasal sinuses and orbits appear grossly   unremarkable.    Impression: No acute intracranial hemorrhage. No CT evidence for an acute   territorial infarct.    Stable chronic small vessel ischemic disease and old infarct.    If clinically indicated, brain MR may be pursued for further evaluation.        Assessment and Recommendation:   89 y/o F pt with PMHx of systolic failure, HTN, COPD, and CVA BIB EMS from nursing home ([facility]) for AMS x 1 week and cough x 2 weeks. Per family, pt has been acting confused, lethargic, and has not been eating. Pt denies chest pain, difficulty breathing, abd pain, vomiting or any other complaints.  CT of abdomen showed Sigmoid diverticulitis without abscess or perforation, Mild bilateral hydronephrosis may be due to distended bladder, and Cholelithiasis  5/9/18 creatinine became normal.    Problem/Recommendation - 1:  Problem: Diverticulitis large intestine w/o perforation or abscess w/bleeding.   Recommendation:   1- Urine CS.  2- Blood culture, and Follow to final report.  3- Urine culture.  4- Continue IV Zosyn 3.375 gm IVPB q 12 hours.  5- Fluid and electrolytes management.  6- CBC and BMP follow up.     Problem/Recommendation - 2:  ·  Problem: Acute renal failure, unspecified acute renal failure type.    Recommendation:   1- As per problem # 1.  2- Urology management and follow up.     Problem/Recommendation - 3:  ·  Problem: Hydronephrosis, unspecified hydronephrosis type.    Recommendation:   1- As per problem # 1 & # 2.     Problem/Recommendation - 4:  ·  Problem: COPD (chronic obstructive pulmonary disease).    Recommendation:   1- Bronchodilators.  2- O2 as needed.  3- Pulmonary management, and follow up.  4- Steroids as per primary, and pulmonary team if needed.     Problem/Recommendation - 5:  ·  Problem: HTN (hypertension).    Recommendation:   1- Monitor Blood pressure closely.  2- Blood pressure control.  3- BP. meds as per cardiology and primary care team.     Discussed with NP in ER.

## 2018-05-10 NOTE — PROGRESS NOTE ADULT - SUBJECTIVE AND OBJECTIVE BOX
Patient is a 89y old  Female who presents with a chief complaint of failure to thrive (08 May 2018 18:14)    pt seen in ed [ x ], reg med floor [   ], bed [ x ], chair at bedside [   ], awake and responsive [ x ], lethargic [  ],  nad [ x ]    patrick [x]      Allergies    Allergy Status Unknown        Vitals    T(F): 98.2 (05-10-18 @ 05:30), Max: 99.2 (05-09-18 @ 22:33)  HR: 75 (05-10-18 @ 05:30) (75 - 84)  BP: 132/75 (05-10-18 @ 05:30) (131/56 - 151/77)  RR: 16 (05-10-18 @ 05:30) (16 - 18)  SpO2: 96% (05-10-18 @ 05:30) (95% - 97%)  Wt(kg): --  CAPILLARY BLOOD GLUCOSE      POCT Blood Glucose.: 96 mg/dL (08 May 2018 14:15)      Labs                          11.5   12.3  )-----------( 180      ( 09 May 2018 06:39 )             36.8       05-10    147<H>  |  112<H>  |  13  ----------------------------<  105<H>  x    |  29  |  0.50    Ca    8.1<L>      10 May 2018 07:17  Phos  2.6     05-09  Mg     1.9     05-10    TPro  7.1  /  Alb  2.3<L>  /  TBili  0.3  /  DBili  x   /  AST  19  /  ALT  11  /  AlkPhos  52  05-08                Radiology Results      Meds    MEDICATIONS  (STANDING):  ALBUTerol/ipratropium for Nebulization 3 milliLiter(s) Nebulizer every 6 hours  ascorbic acid 500 milliGRAM(s) Oral daily  aspirin enteric coated 81 milliGRAM(s) Oral daily  bisacodyl Suppository 10 milliGRAM(s) Rectal daily  calcium carbonate 1250 mG (OsCal) 1 Tablet(s) Oral two times a day  carvedilol 3.125 milliGRAM(s) Oral every 12 hours  dextrose 5% + sodium chloride 0.45%. 1000 milliLiter(s) (60 mL/Hr) IV Continuous <Continuous>  docusate sodium 300 milliGRAM(s) Oral daily  famotidine    Tablet 20 milliGRAM(s) Oral daily  heparin  Injectable 5000 Unit(s) SubCutaneous every 12 hours  lactulose Syrup 20 Gram(s) Oral daily  multivitamin 1 Tablet(s) Oral daily  piperacillin/tazobactam IVPB. 3.375 Gram(s) IV Intermittent every 12 hours  polyethylene glycol 3350 17 Gram(s) Oral daily  senna 2 Tablet(s) Oral two times a day      MEDICATIONS  (PRN):      Physical Exam    Neuro :  right side weakness  Respiratory: CTA B/L  CV: RRR, S1S2, no murmurs,   Abdominal: Soft, ND +BS, left lower quadrant tender to palp  Extremities: No edema, + peripheral pulses    ASSESSMENT    encephalopathy likely 2nd to infectious process  diverticulitis  cholelithiasis  b/l hydronephrosis  Acute renal failure  electrolyte imbalance  h/o CAD (coronary artery disease)  GERD (gastroesophageal reflux disease)  Dementia  CVA (cerebral vascular accident)  COPD (chronic obstructive pulmonary disease)  Heart failure, systolic  History of hypertension  No significant past surgical history      PLAN    ct head neg for acute ischemia noted above  ct abd pelv with sigmoid diverticulitis, hydronephrosis and cholelithiasis noted above  cont zosyn  id f/u  f/u blood cx  f/u stool studies  surg cons  cont patrick  urology cons  f/u renal sono  cont ivf  supplement electrolytes  pulm f/u  pft outpt  cont current meds

## 2018-05-11 LAB
ANION GAP SERPL CALC-SCNC: 3 MMOL/L — LOW (ref 5–17)
ANION GAP SERPL CALC-SCNC: 6 MMOL/L — SIGNIFICANT CHANGE UP (ref 5–17)
BASOPHILS # BLD AUTO: 0.1 K/UL — SIGNIFICANT CHANGE UP (ref 0–0.2)
BASOPHILS NFR BLD AUTO: 0.6 % — SIGNIFICANT CHANGE UP (ref 0–2)
BUN SERPL-MCNC: 8 MG/DL — SIGNIFICANT CHANGE UP (ref 7–18)
BUN SERPL-MCNC: 8 MG/DL — SIGNIFICANT CHANGE UP (ref 7–18)
CALCIUM SERPL-MCNC: 7.7 MG/DL — LOW (ref 8.4–10.5)
CALCIUM SERPL-MCNC: 8 MG/DL — LOW (ref 8.4–10.5)
CHLORIDE SERPL-SCNC: 106 MMOL/L — SIGNIFICANT CHANGE UP (ref 96–108)
CHLORIDE SERPL-SCNC: 110 MMOL/L — HIGH (ref 96–108)
CO2 SERPL-SCNC: 26 MMOL/L — SIGNIFICANT CHANGE UP (ref 22–31)
CO2 SERPL-SCNC: 29 MMOL/L — SIGNIFICANT CHANGE UP (ref 22–31)
CREAT SERPL-MCNC: 0.48 MG/DL — LOW (ref 0.5–1.3)
CREAT SERPL-MCNC: 0.55 MG/DL — SIGNIFICANT CHANGE UP (ref 0.5–1.3)
EOSINOPHIL # BLD AUTO: 0.9 K/UL — HIGH (ref 0–0.5)
EOSINOPHIL NFR BLD AUTO: 8.5 % — HIGH (ref 0–6)
GLUCOSE SERPL-MCNC: 113 MG/DL — HIGH (ref 70–99)
GLUCOSE SERPL-MCNC: 52 MG/DL — LOW (ref 70–99)
HCT VFR BLD CALC: 38 % — SIGNIFICANT CHANGE UP (ref 34.5–45)
HGB BLD-MCNC: 11.9 G/DL — SIGNIFICANT CHANGE UP (ref 11.5–15.5)
LYMPHOCYTES # BLD AUTO: 1.3 K/UL — SIGNIFICANT CHANGE UP (ref 1–3.3)
LYMPHOCYTES # BLD AUTO: 12.4 % — LOW (ref 13–44)
MAGNESIUM SERPL-MCNC: 1.5 MG/DL — LOW (ref 1.6–2.6)
MCHC RBC-ENTMCNC: 27.3 PG — SIGNIFICANT CHANGE UP (ref 27–34)
MCHC RBC-ENTMCNC: 31.3 GM/DL — LOW (ref 32–36)
MCV RBC AUTO: 87 FL — SIGNIFICANT CHANGE UP (ref 80–100)
MONOCYTES # BLD AUTO: 0.6 K/UL — SIGNIFICANT CHANGE UP (ref 0–0.9)
MONOCYTES NFR BLD AUTO: 5.5 % — SIGNIFICANT CHANGE UP (ref 2–14)
NEUTROPHILS # BLD AUTO: 7.6 K/UL — HIGH (ref 1.8–7.4)
NEUTROPHILS NFR BLD AUTO: 73 % — SIGNIFICANT CHANGE UP (ref 43–77)
PHOSPHATE SERPL-MCNC: 1.8 MG/DL — LOW (ref 2.5–4.5)
PLATELET # BLD AUTO: 223 K/UL — SIGNIFICANT CHANGE UP (ref 150–400)
POTASSIUM SERPL-MCNC: 3 MMOL/L — LOW (ref 3.5–5.3)
POTASSIUM SERPL-MCNC: 5.7 MMOL/L — HIGH (ref 3.5–5.3)
POTASSIUM SERPL-SCNC: 3 MMOL/L — LOW (ref 3.5–5.3)
POTASSIUM SERPL-SCNC: 5.7 MMOL/L — HIGH (ref 3.5–5.3)
RBC # BLD: 4.36 M/UL — SIGNIFICANT CHANGE UP (ref 3.8–5.2)
RBC # FLD: 12.3 % — SIGNIFICANT CHANGE UP (ref 10.3–14.5)
SODIUM SERPL-SCNC: 139 MMOL/L — SIGNIFICANT CHANGE UP (ref 135–145)
SODIUM SERPL-SCNC: 141 MMOL/L — SIGNIFICANT CHANGE UP (ref 135–145)
WBC # BLD: 10.4 K/UL — SIGNIFICANT CHANGE UP (ref 3.8–10.5)
WBC # FLD AUTO: 10.4 K/UL — SIGNIFICANT CHANGE UP (ref 3.8–10.5)

## 2018-05-11 RX ORDER — POTASSIUM PHOSPHATE, MONOBASIC POTASSIUM PHOSPHATE, DIBASIC 236; 224 MG/ML; MG/ML
15 INJECTION, SOLUTION INTRAVENOUS ONCE
Qty: 0 | Refills: 0 | Status: COMPLETED | OUTPATIENT
Start: 2018-05-11 | End: 2018-05-11

## 2018-05-11 RX ORDER — SODIUM CHLORIDE 9 MG/ML
1000 INJECTION, SOLUTION INTRAVENOUS
Qty: 0 | Refills: 0 | Status: DISCONTINUED | OUTPATIENT
Start: 2018-05-11 | End: 2018-05-16

## 2018-05-11 RX ORDER — MAGNESIUM SULFATE 500 MG/ML
1 VIAL (ML) INJECTION
Qty: 0 | Refills: 0 | Status: COMPLETED | OUTPATIENT
Start: 2018-05-11 | End: 2018-05-11

## 2018-05-11 RX ORDER — IPRATROPIUM/ALBUTEROL SULFATE 18-103MCG
3 AEROSOL WITH ADAPTER (GRAM) INHALATION ONCE
Qty: 0 | Refills: 0 | Status: COMPLETED | OUTPATIENT
Start: 2018-05-11 | End: 2018-05-11

## 2018-05-11 RX ORDER — POTASSIUM CHLORIDE 20 MEQ
40 PACKET (EA) ORAL ONCE
Qty: 0 | Refills: 0 | Status: COMPLETED | OUTPATIENT
Start: 2018-05-11 | End: 2018-05-11

## 2018-05-11 RX ORDER — INSULIN HUMAN 100 [IU]/ML
10 INJECTION, SOLUTION SUBCUTANEOUS ONCE
Qty: 0 | Refills: 0 | Status: COMPLETED | OUTPATIENT
Start: 2018-05-11 | End: 2018-05-11

## 2018-05-11 RX ORDER — POTASSIUM CHLORIDE 20 MEQ
10 PACKET (EA) ORAL
Qty: 0 | Refills: 0 | Status: COMPLETED | OUTPATIENT
Start: 2018-05-11 | End: 2018-05-11

## 2018-05-11 RX ORDER — FUROSEMIDE 40 MG
20 TABLET ORAL ONCE
Qty: 0 | Refills: 0 | Status: COMPLETED | OUTPATIENT
Start: 2018-05-11 | End: 2018-05-11

## 2018-05-11 RX ORDER — DEXTROSE 50 % IN WATER 50 %
25 SYRINGE (ML) INTRAVENOUS ONCE
Qty: 0 | Refills: 0 | Status: COMPLETED | OUTPATIENT
Start: 2018-05-11 | End: 2018-05-11

## 2018-05-11 RX ADMIN — INSULIN HUMAN 10 UNIT(S): 100 INJECTION, SOLUTION SUBCUTANEOUS at 02:00

## 2018-05-11 RX ADMIN — POLYETHYLENE GLYCOL 3350 17 GRAM(S): 17 POWDER, FOR SOLUTION ORAL at 12:06

## 2018-05-11 RX ADMIN — SODIUM CHLORIDE 70 MILLILITER(S): 9 INJECTION, SOLUTION INTRAVENOUS at 02:00

## 2018-05-11 RX ADMIN — CARVEDILOL PHOSPHATE 3.12 MILLIGRAM(S): 80 CAPSULE, EXTENDED RELEASE ORAL at 17:45

## 2018-05-11 RX ADMIN — Medication 3 MILLILITER(S): at 02:01

## 2018-05-11 RX ADMIN — Medication 10 MILLIGRAM(S): at 12:05

## 2018-05-11 RX ADMIN — HEPARIN SODIUM 5000 UNIT(S): 5000 INJECTION INTRAVENOUS; SUBCUTANEOUS at 17:44

## 2018-05-11 RX ADMIN — Medication 1 TABLET(S): at 17:44

## 2018-05-11 RX ADMIN — Medication 3 MILLILITER(S): at 09:03

## 2018-05-11 RX ADMIN — SENNA PLUS 2 TABLET(S): 8.6 TABLET ORAL at 17:45

## 2018-05-11 RX ADMIN — Medication 1 TABLET(S): at 12:05

## 2018-05-11 RX ADMIN — Medication 100 GRAM(S): at 12:02

## 2018-05-11 RX ADMIN — Medication 100 MILLIEQUIVALENT(S): at 09:47

## 2018-05-11 RX ADMIN — Medication 40 MILLIEQUIVALENT(S): at 12:03

## 2018-05-11 RX ADMIN — PIPERACILLIN AND TAZOBACTAM 25 GRAM(S): 4; .5 INJECTION, POWDER, LYOPHILIZED, FOR SOLUTION INTRAVENOUS at 05:14

## 2018-05-11 RX ADMIN — LACTULOSE 20 GRAM(S): 10 SOLUTION ORAL at 12:06

## 2018-05-11 RX ADMIN — HEPARIN SODIUM 5000 UNIT(S): 5000 INJECTION INTRAVENOUS; SUBCUTANEOUS at 05:14

## 2018-05-11 RX ADMIN — Medication 300 MILLIGRAM(S): at 12:05

## 2018-05-11 RX ADMIN — Medication 100 GRAM(S): at 09:48

## 2018-05-11 RX ADMIN — Medication 25 MILLILITER(S): at 02:01

## 2018-05-11 RX ADMIN — POTASSIUM PHOSPHATE, MONOBASIC POTASSIUM PHOSPHATE, DIBASIC 62.5 MILLIMOLE(S): 236; 224 INJECTION, SOLUTION INTRAVENOUS at 12:04

## 2018-05-11 RX ADMIN — Medication 500 MILLIGRAM(S): at 12:05

## 2018-05-11 RX ADMIN — FAMOTIDINE 20 MILLIGRAM(S): 10 INJECTION INTRAVENOUS at 12:05

## 2018-05-11 RX ADMIN — Medication 20 MILLIGRAM(S): at 02:00

## 2018-05-11 RX ADMIN — Medication 3 MILLILITER(S): at 15:55

## 2018-05-11 RX ADMIN — Medication 3 MILLILITER(S): at 20:57

## 2018-05-11 RX ADMIN — Medication 81 MILLIGRAM(S): at 12:05

## 2018-05-11 RX ADMIN — PIPERACILLIN AND TAZOBACTAM 25 GRAM(S): 4; .5 INJECTION, POWDER, LYOPHILIZED, FOR SOLUTION INTRAVENOUS at 17:44

## 2018-05-11 NOTE — CONSULT NOTE ADULT - SUBJECTIVE AND OBJECTIVE BOX
HPI: 89[facility] HT[facility], COPD, CVA w/ residual right sided weakness, HLD, Hyponatremia, CAD, Gastritis, mild cognitive impairment who comes to the hospital for failure to thrive and altered mental status. Note from the facility and history for family says that she has been having poor appetite for the past few days and is very lethargic and not talking. Note from facility also says that she had abdominal distension and they did a KUB with negative result. Patient was then transferred to the hospital for evaluation.  Labs done at nursing home also note a WBC of 12.73  CXR was negative for acute disease. (08 May 2018 18:14)    Called by medical team to evaluate patient found with sigmoid diverticulitis on CT scan and abdominal distention on exam. Per medical team patient has been having bowel movements since admission and is also passing flatus, but that she did not initially tolerate her diet. Patient received Kayexalate yesterday for hyperkalemia. Patient seen and examined at bedside, she is unable to provide her own history.     PAST MEDICAL & SURGICAL HISTORY:  CAD (coronary artery disease)  GERD (gastroesophageal reflux disease)  CVA (cerebral vascular accident)  COPD (chronic obstructive pulmonary disease)  History of hypertension  No significant past surgical history    Review of Systems: unable to obtain    MEDICATIONS  (STANDING):  ALBUTerol/ipratropium for Nebulization 3 milliLiter(s) Nebulizer every 6 hours  ascorbic acid 500 milliGRAM(s) Oral daily  aspirin enteric coated 81 milliGRAM(s) Oral daily  bisacodyl Suppository 10 milliGRAM(s) Rectal daily  calcium carbonate 1250 mG (OsCal) 1 Tablet(s) Oral two times a day  carvedilol 3.125 milliGRAM(s) Oral every 12 hours  dextrose 5% + sodium chloride 0.45%. 1000 milliLiter(s) (70 mL/Hr) IV Continuous <Continuous>  docusate sodium 300 milliGRAM(s) Oral daily  famotidine    Tablet 20 milliGRAM(s) Oral daily  heparin  Injectable 5000 Unit(s) SubCutaneous every 12 hours  lactulose Syrup 20 Gram(s) Oral daily  magnesium sulfate  IVPB 1 Gram(s) IV Intermittent every 1 hour  multivitamin 1 Tablet(s) Oral daily  piperacillin/tazobactam IVPB. 3.375 Gram(s) IV Intermittent every 12 hours  polyethylene glycol 3350 17 Gram(s) Oral daily  potassium chloride  10 mEq/100 mL IVPB 10 milliEquivalent(s) IV Intermittent every 1 hour  potassium phosphate IVPB 15 milliMole(s) IV Intermittent once  senna 2 Tablet(s) Oral two times a day    MEDICATIONS  (PRN):    Allergies: Allergy Status Unknown    Vital Signs Last 24 Hrs  T(C): 36.7 (11 May 2018 05:30), Max: 36.9 (10 May 2018 14:42)  T(F): 98.1 (11 May 2018 05:30), Max: 98.4 (10 May 2018 14:42)  HR: 81 (11 May 2018 05:30) (73 - 81)  BP: 118/72 (11 May 2018 05:30) (110/63 - 142/63)  RR: 18 (11 May 2018 05:30) (16 - 18)  SpO2: 95% (11 May 2018 05:30) (94% - 97%)    Physical Exam:    General: NAD  Chest:  respirations nonlabored  Abdomen:  +mild distention, soft, mild nonspecific tenderness to palpation  Skin: warm and dry  Neuro: Alert, only minimally responsive to questions.     LABS:                        11.9   10.4  )-----------( 223      ( 11 May 2018 06:59 )             38.0     05-11    141  |  106  |  8   ----------------------------<  52<L>  3.0<L>   |  29  |  0.55    Ca    8.0<L>      11 May 2018 06:59  Phos  1.8     05-11  Mg     1.5     05-11    RADIOLOGY & ADDITIONAL STUDIES:    < from: CT Abdomen and Pelvis No Cont (05.08.18 @ 17:33) >    EXAM:  CT ABDOMEN AND PELVIS                          PROCEDURE DATE:  05/08/2018      INTERPRETATION:  CLINICAL STATEMENT: abd pain, distension, r/o volvulus    TECHNIQUE: CT of the abdomen and pelvis was performed in the axial plane   utilizing thin slices without IV or oral contrast. Sagittal and coronal   reformatting was performed.    COMPARISON: None.    FINDINGS:    The lower chest demonstrates areas of linear scarring in the lingula and   right lower lobe.    The evaluation of theabdominal viscera and the bowel is limited without   contrast.    The liver is unremarkable. Cholelithiasis is seen.    The spleen, pancreas and adrenal glands are grossly unremarkable.    There is mild bilateral hydronephrosis or urinary calculus. There is a   small cyst in the lateral midpole of the right kidney. The bladder is   unremarkable    There is no bowel obstruction.  There is no intraperitoneal free air.    There is no free fluid.The appendix is seen. There is colonic   diverticulosis. There is stranding in the fat surrounding the sigmoid   colon in the lower abdomen in the midline suggestive of diverticulitis   without evidence of abscess or perforation    There is no abdominal or pelvic lymphadenopathy.  The pelvic structures   are grossly unremarkable.    The aorta is not aneurysmal. There is no significant retroperitoneal or   pelvic lymphadenopathy. Pelvic structures are remarkable for   calcifications in the uterus which may represent fibroids..    The bony structures demonstrate degenerative changes of the spine with   levoscoliosis of the lumbar spine.    IMPRESSION:  Sigmoid diverticulitis without abscess or perforation  Mild bilateral hydronephrosis may be due to distended bladder  Cholelithiasis    DAGO SWANSON M.D.,ATTENDING RADIOLOGIST  This document has been electronically signed. May  8 2018  5:59PM    < end of copied text >    Abdominal Xray reviewed, full report pending.

## 2018-05-11 NOTE — PROGRESS NOTE ADULT - SUBJECTIVE AND OBJECTIVE BOX
Patient is a 89y old  Female who presents with a chief complaint of failure to thrive (08 May 2018 18:14)    pt seen in ed [  ], reg med floor [ x  ], bed [ x ], chair at bedside [   ], awake and responsive [ x ], lethargic [  ],  nad [ x ]    patrick [x]      Allergies    Allergy Status Unknown        Vitals    T(F): 98.1 (05-11-18 @ 05:30), Max: 98.4 (05-10-18 @ 14:42)  HR: 81 (05-11-18 @ 05:30) (73 - 81)  BP: 118/72 (05-11-18 @ 05:30) (110/63 - 142/63)  RR: 18 (05-11-18 @ 05:30) (16 - 18)  SpO2: 95% (05-11-18 @ 05:30) (94% - 97%)  Wt(kg): --  CAPILLARY BLOOD GLUCOSE          Labs                          11.9   10.4  )-----------( 223      ( 11 May 2018 06:59 )             38.0       05-11    141  |  106  |  8   ----------------------------<  52<L>  3.0<L>   |  29  |  0.55    Ca    8.0<L>      11 May 2018 06:59  Phos  1.8     05-11  Mg     1.5     05-11        Radiology Results      < from: US Renal (05.10.18 @ 16:35) >  IMPRESSION:     No evidence for bilateral hydronephrosis.    < end of copied text >        Meds    MEDICATIONS  (STANDING):  ALBUTerol/ipratropium for Nebulization 3 milliLiter(s) Nebulizer every 6 hours  ascorbic acid 500 milliGRAM(s) Oral daily  aspirin enteric coated 81 milliGRAM(s) Oral daily  bisacodyl Suppository 10 milliGRAM(s) Rectal daily  calcium carbonate 1250 mG (OsCal) 1 Tablet(s) Oral two times a day  carvedilol 3.125 milliGRAM(s) Oral every 12 hours  dextrose 5% + sodium chloride 0.45%. 1000 milliLiter(s) (70 mL/Hr) IV Continuous <Continuous>  docusate sodium 300 milliGRAM(s) Oral daily  famotidine    Tablet 20 milliGRAM(s) Oral daily  heparin  Injectable 5000 Unit(s) SubCutaneous every 12 hours  lactulose Syrup 20 Gram(s) Oral daily  magnesium sulfate  IVPB 1 Gram(s) IV Intermittent every 1 hour  multivitamin 1 Tablet(s) Oral daily  piperacillin/tazobactam IVPB. 3.375 Gram(s) IV Intermittent every 12 hours  polyethylene glycol 3350 17 Gram(s) Oral daily  potassium chloride  10 mEq/100 mL IVPB 10 milliEquivalent(s) IV Intermittent every 1 hour  potassium phosphate IVPB 15 milliMole(s) IV Intermittent once  senna 2 Tablet(s) Oral two times a day      MEDICATIONS  (PRN):      Physical Exam      Neuro :  right side weakness  Respiratory: CTA B/L  CV: RRR, S1S2, no murmurs,   Abdominal: Soft, ND +BS, left lower quadrant tender to palp  Extremities: No edema, + peripheral pulses    ASSESSMENT    encephalopathy likely 2nd to infectious process  diverticulitis  cholelithiasis  b/l hydronephrosis  s/p Acute renal failure  electrolyte imbalance  h/o CAD (coronary artery disease)  GERD (gastroesophageal reflux disease)  Dementia  CVA (cerebral vascular accident)  COPD (chronic obstructive pulmonary disease)  Heart failure, systolic  History of hypertension  No significant past surgical history      PLAN    ct head neg for acute ischemia noted above  ct abd pelv with sigmoid diverticulitis, hydronephrosis and cholelithiasis noted above  cont zosyn  id f/u noted  f/u blood cx  f/u stool studies  surg cons noted  no surgical intervention indicated at this time  d/c patrick with tov  renal sono neg for hydronephrosis  cont ivf  urine creat wnl  supplement potassium  pulm f/u  pft outpt  cont current meds Patient is a 89y old  Female who presents with a chief complaint of failure to thrive (08 May 2018 18:14)    pt seen in ed [  ], reg med floor [ x  ], bed [ x ], chair at bedside [   ], awake and responsive [ x ], lethargic [  ],  nad [ x ]    patrick [x]      Allergies    Allergy Status Unknown        Vitals    T(F): 98.1 (05-11-18 @ 05:30), Max: 98.4 (05-10-18 @ 14:42)  HR: 81 (05-11-18 @ 05:30) (73 - 81)  BP: 118/72 (05-11-18 @ 05:30) (110/63 - 142/63)  RR: 18 (05-11-18 @ 05:30) (16 - 18)  SpO2: 95% (05-11-18 @ 05:30) (94% - 97%)  Wt(kg): --  CAPILLARY BLOOD GLUCOSE          Labs                          11.9   10.4  )-----------( 223      ( 11 May 2018 06:59 )             38.0       05-11    141  |  106  |  8   ----------------------------<  52<L>  3.0<L>   |  29  |  0.55    Ca    8.0<L>      11 May 2018 06:59  Phos  1.8     05-11  Mg     1.5     05-11        Radiology Results      < from: US Renal (05.10.18 @ 16:35) >  IMPRESSION:     No evidence for bilateral hydronephrosis.    < end of copied text >        Meds    MEDICATIONS  (STANDING):  ALBUTerol/ipratropium for Nebulization 3 milliLiter(s) Nebulizer every 6 hours  ascorbic acid 500 milliGRAM(s) Oral daily  aspirin enteric coated 81 milliGRAM(s) Oral daily  bisacodyl Suppository 10 milliGRAM(s) Rectal daily  calcium carbonate 1250 mG (OsCal) 1 Tablet(s) Oral two times a day  carvedilol 3.125 milliGRAM(s) Oral every 12 hours  dextrose 5% + sodium chloride 0.45%. 1000 milliLiter(s) (70 mL/Hr) IV Continuous <Continuous>  docusate sodium 300 milliGRAM(s) Oral daily  famotidine    Tablet 20 milliGRAM(s) Oral daily  heparin  Injectable 5000 Unit(s) SubCutaneous every 12 hours  lactulose Syrup 20 Gram(s) Oral daily  magnesium sulfate  IVPB 1 Gram(s) IV Intermittent every 1 hour  multivitamin 1 Tablet(s) Oral daily  piperacillin/tazobactam IVPB. 3.375 Gram(s) IV Intermittent every 12 hours  polyethylene glycol 3350 17 Gram(s) Oral daily  potassium chloride  10 mEq/100 mL IVPB 10 milliEquivalent(s) IV Intermittent every 1 hour  potassium phosphate IVPB 15 milliMole(s) IV Intermittent once  senna 2 Tablet(s) Oral two times a day      MEDICATIONS  (PRN):      Physical Exam      Neuro :  right side weakness  Respiratory: CTA B/L  CV: RRR, S1S2, no murmurs,   Abdominal: Soft, ND +BS, mild left lower quadrant tender to deep palp  Extremities: No edema, + peripheral pulses    ASSESSMENT    encephalopathy likely 2nd to infectious process  diverticulitis  cholelithiasis  b/l hydronephrosis  s/p Acute renal failure  electrolyte imbalance  h/o CAD (coronary artery disease)  GERD (gastroesophageal reflux disease)  Dementia  CVA (cerebral vascular accident)  COPD (chronic obstructive pulmonary disease)  Heart failure, systolic  History of hypertension  No significant past surgical history      PLAN    ct head neg for acute ischemia noted above  ct abd pelv with sigmoid diverticulitis, hydronephrosis and cholelithiasis noted above  cont zosyn  id f/u noted  f/u blood cx  f/u stool studies  surg cons noted  no surgical intervention indicated at this time  d/c patrick with tov  renal sono neg for hydronephrosis noted above  cont ivf  urine creat wnl  supplement potassium  pulm f/u  pft outpt  start clears and adv as tolerated  cont current meds

## 2018-05-11 NOTE — CONSULT NOTE ADULT - ASSESSMENT
89 year old female presented from nursing home with AMS and failure to thrive and found to have sigmoid diverticulitis and bilateral hydronephrosis on CT scan. Consult called today due to abdominal distention, no clinical signs of obstruction. Patient noted to have hypokalemia, hypomagnesemia, and hypophosphatemia. PMH CVA with right sided weakness, CAD, COPD, HTN, and GERD    - recommend trial of clear liquid diet and advance diet as tolerated  - continue antibiotics for diverticulitis per medicine  - recommend electrolyte replacement, continue to monitor labs  - no surgical intervention needed at this time  - continue medical management  - discussed with Dr. Ayala

## 2018-05-11 NOTE — PROGRESS NOTE ADULT - SUBJECTIVE AND OBJECTIVE BOX
INTERVAL HPI/OVERNIGHT EVENTS:  Hypokalemia yest day , replaced and hyperkalemia at midnight? , s/p Kayexalate     VITAL SIGNS:  T(F): 98.1 (05-11-18 @ 05:30)  HR: 81 (05-11-18 @ 05:30)  BP: 118/72 (05-11-18 @ 05:30)  RR: 18 (05-11-18 @ 05:30)  SpO2: 95% (05-11-18 @ 05:30)    PHYSICAL EXAM:     Neuro :  right side weakness  Respiratory: CTA B/L  CV: RRR, S1S2, no murmurs,   Abdominal: Soft, ND +BS, left lower quadrant tender to palp  Extremities: No edema, + peripheral pulses    MEDICATIONS  (STANDING):  ALBUTerol/ipratropium for Nebulization 3 milliLiter(s) Nebulizer every 6 hours  ascorbic acid 500 milliGRAM(s) Oral daily  aspirin enteric coated 81 milliGRAM(s) Oral daily  bisacodyl Suppository 10 milliGRAM(s) Rectal daily  calcium carbonate 1250 mG (OsCal) 1 Tablet(s) Oral two times a day  carvedilol 3.125 milliGRAM(s) Oral every 12 hours  dextrose 5% + sodium chloride 0.45%. 1000 milliLiter(s) (70 mL/Hr) IV Continuous <Continuous>  docusate sodium 300 milliGRAM(s) Oral daily  famotidine    Tablet 20 milliGRAM(s) Oral daily  heparin  Injectable 5000 Unit(s) SubCutaneous every 12 hours  lactulose Syrup 20 Gram(s) Oral daily  multivitamin 1 Tablet(s) Oral daily  piperacillin/tazobactam IVPB. 3.375 Gram(s) IV Intermittent every 12 hours  polyethylene glycol 3350 17 Gram(s) Oral daily  senna 2 Tablet(s) Oral two times a day    MEDICATIONS  (PRN):      Allergies    Allergy Status Unknown    Intolerances        LABS:                        11.9   10.4  )-----------( 223      ( 11 May 2018 06:59 )             38.0     05-11    141  |  106  |  8   ----------------------------<  52<L>  3.0<L>   |  29  |  0.55    Ca    8.0<L>      11 May 2018 06:59  Phos  1.8     05-11  Mg     1.5     05-11            RADIOLOGY & ADDITIONAL TESTS:

## 2018-05-11 NOTE — PROGRESS NOTE ADULT - SUBJECTIVE AND OBJECTIVE BOX
Patient is a 89y old  Female who presents with a chief complaint of failure to thrive (08 May 2018 18:14)  Awake, alert, comfortable in bed in NAD    INTERVAL HPI/OVERNIGHT EVENTS:      VITAL SIGNS:  T(F): 98.1 (05-11-18 @ 05:30)  HR: 81 (05-11-18 @ 05:30)  BP: 118/72 (05-11-18 @ 05:30)  RR: 18 (05-11-18 @ 05:30)  SpO2: 95% (05-11-18 @ 05:30)  Wt(kg): --  I&O's Detail    10 May 2018 07:01  -  11 May 2018 07:00  --------------------------------------------------------  IN:  Total IN: 0 mL    OUT:    Indwelling Catheter - Urethral: 2475 mL  Total OUT: 2475 mL    Total NET: -2475 mL      11 May 2018 07:01  -  11 May 2018 14:27  --------------------------------------------------------  IN:  Total IN: 0 mL    OUT:    Indwelling Catheter - Urethral: 1300 mL  Total OUT: 1300 mL    Total NET: -1300 mL              REVIEW OF SYSTEMS:    CONSTITUTIONAL:  No fevers, chills, sweats    HEENT:  Eyes:  No diplopia or blurred vision. ENT:  No earache, sore throat or runny nose.    CARDIOVASCULAR:  No pressure, squeezing, tightness, or heaviness about the chest; no palpitations.    RESPIRATORY:  Per HPI    GASTROINTESTINAL:  No abdominal pain, nausea, vomiting or diarrhea.    GENITOURINARY:  No dysuria, frequency or urgency.    NEUROLOGIC:  No paresthesias, fasciculations, seizures or weakness.    PSYCHIATRIC:  No disorder of thought or mood.      PHYSICAL EXAM:    Constitutional: Well developed and nourished  Eyes:Perrla  ENMT: normal  Neck:supple  Respiratory: good air entry  Cardiovascular: S1 S2 regular  Gastrointestinal: Soft, Non tender  Extremities: No edema  Vascular:normal  Neurological:Awake, alert,Ox3  Musculoskeletal:Normal      MEDICATIONS  (STANDING):  ALBUTerol/ipratropium for Nebulization 3 milliLiter(s) Nebulizer every 6 hours  ascorbic acid 500 milliGRAM(s) Oral daily  aspirin enteric coated 81 milliGRAM(s) Oral daily  bisacodyl Suppository 10 milliGRAM(s) Rectal daily  calcium carbonate 1250 mG (OsCal) 1 Tablet(s) Oral two times a day  carvedilol 3.125 milliGRAM(s) Oral every 12 hours  dextrose 5% + sodium chloride 0.45%. 1000 milliLiter(s) (70 mL/Hr) IV Continuous <Continuous>  docusate sodium 300 milliGRAM(s) Oral daily  famotidine    Tablet 20 milliGRAM(s) Oral daily  heparin  Injectable 5000 Unit(s) SubCutaneous every 12 hours  lactulose Syrup 20 Gram(s) Oral daily  multivitamin 1 Tablet(s) Oral daily  piperacillin/tazobactam IVPB. 3.375 Gram(s) IV Intermittent every 12 hours  polyethylene glycol 3350 17 Gram(s) Oral daily  senna 2 Tablet(s) Oral two times a day    MEDICATIONS  (PRN):      Allergies    Allergy Status Unknown    Intolerances        LABS:                        11.9   10.4  )-----------( 223      ( 11 May 2018 06:59 )             38.0     05-11    141  |  106  |  8   ----------------------------<  52<L>  3.0<L>   |  29  |  0.55    Ca    8.0<L>      11 May 2018 06:59  Phos  1.8     05-11  Mg     1.5     05-11                CAPILLARY BLOOD GLUCOSE            RADIOLOGY & ADDITIONAL TESTS:  < from: Xray Abdomen 1 View PORTABLE -Urgent (05.10.18 @ 17:32) >    INTERPRETATION:  Portable supine abdominal study. 2 films submitted.   Patient has abdominal distention.    Some slight arterial calcifications arenoted. There is a to and fro   thoracolumbar curve with lower lumbar degeneration.    Abdominal gas pattern is unremarkable. No gross organomegaly is seen.    There are some slight infiltrate/scar changes at left base again seen.    IMPRESSION: As above.    < end of copied text >    CXR:    Ct scan chest:    ekg;    echo:

## 2018-05-11 NOTE — PROGRESS NOTE ADULT - ASSESSMENT
89F from [facility] HTN, COPD, CVA w/ residual right sided weakness, HLD, Hyponatremia, CAD, Gastritis, mild cognitive impairment who comes to the hospital for failure to thrive and altered mental status. Patient found to have sigmoid diverticulitis, acute renal failure, hypernatremia, and failure to thrive       Problem/Plan - 1:  ·  Problem: Sigmoid diverticulitis.    upon admission -patient has elevated wbc and ct findings of infection  -will treat with zosyn day 3   surg consult - no intervention   abd xray - no SBo  BS +nt , non tender belly  last BM today  -monitor wbc count  -monitor for clinical improvement  started on clears  -hydrate with IV fluids.      Problem/Plan - 2:  ·  Problem: Acute renal failure, unspecified acute renal failure type.    Resolved     Problem/Plan - 3:  ·  Problem: Hydronephrosis, unspecified hydronephrosis type.    renal sono - hydronephrosis resolved     Problem/Plan - 4:  ·  Problem: Hypernatremia.    Im[proved     Problem/Plan - 5:  ·  Problem: Encephalopathy.  Plan: likely a combination of infection, acute renal failure and hyperNa  improved mental status  S. Na and Cr better,        Problem/Plan - 6:  Problem: COPD (chronic obstructive pulmonary disease). Plan: not in exacerbation  -will give bronchodilators q6h  -no need for steroids at this time.     Problem/Plan - 7:  ·  Problem: Need for prophylactic measure.  Plan: IMPROVE VTE Individual Risk Assessment    RISK                                                          Points  [] Previous VTE                                           3  [] Thrombophilia                                        2  [] Lower limb paralysis                              2   [] Current Cancer                                       2   [x] Immobilization > 24 hrs                        1  [] ICU/CCU stay > 24 hours                       1  [x] Age > 60                                                   1    IMPROVE VTE Score: 2, will give hsq for dvt ppx  c/w home zantac for gastritis.     Calcium supplements and constipation meds

## 2018-05-12 LAB
ANION GAP SERPL CALC-SCNC: 8 MMOL/L — SIGNIFICANT CHANGE UP (ref 5–17)
BASOPHILS # BLD AUTO: 0 K/UL — SIGNIFICANT CHANGE UP (ref 0–0.2)
BASOPHILS NFR BLD AUTO: 0.4 % — SIGNIFICANT CHANGE UP (ref 0–2)
BUN SERPL-MCNC: 4 MG/DL — LOW (ref 7–18)
CALCIUM SERPL-MCNC: 8.2 MG/DL — LOW (ref 8.4–10.5)
CHLORIDE SERPL-SCNC: 106 MMOL/L — SIGNIFICANT CHANGE UP (ref 96–108)
CO2 SERPL-SCNC: 27 MMOL/L — SIGNIFICANT CHANGE UP (ref 22–31)
CREAT SERPL-MCNC: 0.65 MG/DL — SIGNIFICANT CHANGE UP (ref 0.5–1.3)
EOSINOPHIL # BLD AUTO: 0.8 K/UL — HIGH (ref 0–0.5)
EOSINOPHIL NFR BLD AUTO: 8.7 % — HIGH (ref 0–6)
GLUCOSE SERPL-MCNC: 149 MG/DL — HIGH (ref 70–99)
HCT VFR BLD CALC: 36.8 % — SIGNIFICANT CHANGE UP (ref 34.5–45)
HGB BLD-MCNC: 12.1 G/DL — SIGNIFICANT CHANGE UP (ref 11.5–15.5)
LYMPHOCYTES # BLD AUTO: 1.2 K/UL — SIGNIFICANT CHANGE UP (ref 1–3.3)
LYMPHOCYTES # BLD AUTO: 13.8 % — SIGNIFICANT CHANGE UP (ref 13–44)
MAGNESIUM SERPL-MCNC: 2.1 MG/DL — SIGNIFICANT CHANGE UP (ref 1.6–2.6)
MCHC RBC-ENTMCNC: 28.2 PG — SIGNIFICANT CHANGE UP (ref 27–34)
MCHC RBC-ENTMCNC: 32.8 GM/DL — SIGNIFICANT CHANGE UP (ref 32–36)
MCV RBC AUTO: 86.1 FL — SIGNIFICANT CHANGE UP (ref 80–100)
MONOCYTES # BLD AUTO: 0.5 K/UL — SIGNIFICANT CHANGE UP (ref 0–0.9)
MONOCYTES NFR BLD AUTO: 6 % — SIGNIFICANT CHANGE UP (ref 2–14)
NEUTROPHILS # BLD AUTO: 6.2 K/UL — SIGNIFICANT CHANGE UP (ref 1.8–7.4)
NEUTROPHILS NFR BLD AUTO: 71.2 % — SIGNIFICANT CHANGE UP (ref 43–77)
PHOSPHATE SERPL-MCNC: 2.7 MG/DL — SIGNIFICANT CHANGE UP (ref 2.5–4.5)
PLATELET # BLD AUTO: 220 K/UL — SIGNIFICANT CHANGE UP (ref 150–400)
POTASSIUM SERPL-MCNC: 3.6 MMOL/L — SIGNIFICANT CHANGE UP (ref 3.5–5.3)
POTASSIUM SERPL-SCNC: 3.6 MMOL/L — SIGNIFICANT CHANGE UP (ref 3.5–5.3)
RBC # BLD: 4.28 M/UL — SIGNIFICANT CHANGE UP (ref 3.8–5.2)
RBC # FLD: 12.2 % — SIGNIFICANT CHANGE UP (ref 10.3–14.5)
SODIUM SERPL-SCNC: 141 MMOL/L — SIGNIFICANT CHANGE UP (ref 135–145)
WBC # BLD: 8.7 K/UL — SIGNIFICANT CHANGE UP (ref 3.8–10.5)
WBC # FLD AUTO: 8.7 K/UL — SIGNIFICANT CHANGE UP (ref 3.8–10.5)

## 2018-05-12 RX ADMIN — Medication 1 TABLET(S): at 12:17

## 2018-05-12 RX ADMIN — CARVEDILOL PHOSPHATE 3.12 MILLIGRAM(S): 80 CAPSULE, EXTENDED RELEASE ORAL at 06:13

## 2018-05-12 RX ADMIN — HEPARIN SODIUM 5000 UNIT(S): 5000 INJECTION INTRAVENOUS; SUBCUTANEOUS at 18:14

## 2018-05-12 RX ADMIN — Medication 3 MILLILITER(S): at 20:25

## 2018-05-12 RX ADMIN — Medication 81 MILLIGRAM(S): at 12:17

## 2018-05-12 RX ADMIN — Medication 500 MILLIGRAM(S): at 12:18

## 2018-05-12 RX ADMIN — LACTULOSE 20 GRAM(S): 10 SOLUTION ORAL at 12:18

## 2018-05-12 RX ADMIN — Medication 3 MILLILITER(S): at 02:07

## 2018-05-12 RX ADMIN — SODIUM CHLORIDE 70 MILLILITER(S): 9 INJECTION, SOLUTION INTRAVENOUS at 02:40

## 2018-05-12 RX ADMIN — FAMOTIDINE 20 MILLIGRAM(S): 10 INJECTION INTRAVENOUS at 12:18

## 2018-05-12 RX ADMIN — HEPARIN SODIUM 5000 UNIT(S): 5000 INJECTION INTRAVENOUS; SUBCUTANEOUS at 06:13

## 2018-05-12 RX ADMIN — Medication 10 MILLIGRAM(S): at 12:18

## 2018-05-12 RX ADMIN — Medication 3 MILLILITER(S): at 09:02

## 2018-05-12 RX ADMIN — SENNA PLUS 2 TABLET(S): 8.6 TABLET ORAL at 06:13

## 2018-05-12 RX ADMIN — PIPERACILLIN AND TAZOBACTAM 25 GRAM(S): 4; .5 INJECTION, POWDER, LYOPHILIZED, FOR SOLUTION INTRAVENOUS at 18:14

## 2018-05-12 RX ADMIN — POLYETHYLENE GLYCOL 3350 17 GRAM(S): 17 POWDER, FOR SOLUTION ORAL at 12:18

## 2018-05-12 RX ADMIN — Medication 1 TABLET(S): at 06:13

## 2018-05-12 RX ADMIN — SENNA PLUS 2 TABLET(S): 8.6 TABLET ORAL at 18:14

## 2018-05-12 RX ADMIN — PIPERACILLIN AND TAZOBACTAM 25 GRAM(S): 4; .5 INJECTION, POWDER, LYOPHILIZED, FOR SOLUTION INTRAVENOUS at 06:14

## 2018-05-12 RX ADMIN — Medication 300 MILLIGRAM(S): at 12:17

## 2018-05-12 RX ADMIN — Medication 3 MILLILITER(S): at 15:17

## 2018-05-12 RX ADMIN — SODIUM CHLORIDE 70 MILLILITER(S): 9 INJECTION, SOLUTION INTRAVENOUS at 22:37

## 2018-05-12 RX ADMIN — CARVEDILOL PHOSPHATE 3.12 MILLIGRAM(S): 80 CAPSULE, EXTENDED RELEASE ORAL at 18:14

## 2018-05-12 RX ADMIN — Medication 1 TABLET(S): at 18:14

## 2018-05-12 NOTE — PROGRESS NOTE ADULT - SUBJECTIVE AND OBJECTIVE BOX
Patient is a 89y old  Female who presents with a chief complaint of failure to thrive (08 May 2018 18:14)    Awake , alert , comfortable in bed in NAD , feels better , no new complains.    INTERVAL HPI/OVERNIGHT EVENTS:      VITAL SIGNS:  T(F): 98.7 (05-12-18 @ 14:11)  HR: 95 (05-12-18 @ 18:12)  BP: 143/85 (05-12-18 @ 18:12)  RR: 18 (05-12-18 @ 14:11)  SpO2: 100% (05-12-18 @ 14:11)  Wt(kg): --  I&O's Detail    11 May 2018 07:01  -  12 May 2018 07:00  --------------------------------------------------------  IN:  Total IN: 0 mL    OUT:    Indwelling Catheter - Urethral: 1300 mL    Stool: 2 mL  Total OUT: 1302 mL    Total NET: -1302 mL              REVIEW OF SYSTEMS:    CONSTITUTIONAL:  No fevers, chills, sweats    HEENT:  Eyes:  No diplopia or blurred vision. ENT:  No earache, sore throat or runny nose.    CARDIOVASCULAR:  No pressure, squeezing, tightness, or heaviness about the chest; no palpitations.    RESPIRATORY:  Per HPI    GASTROINTESTINAL:  No abdominal pain, nausea, vomiting or diarrhea.    GENITOURINARY:  No dysuria, frequency or urgency.    NEUROLOGIC:  No paresthesias, fasciculations, seizures or weakness.    PSYCHIATRIC:  No disorder of thought or mood.      PHYSICAL EXAM:    Constitutional: Well developed and nourished  Eyes:Perrla  ENMT: normal  Neck:supple  Respiratory: good air entry  Cardiovascular: S1 S2 regular  Gastrointestinal: Soft, Non tender  Extremities: No edema  Vascular:normal  Neurological:Awake, alert,Ox3  Musculoskeletal:Normal      MEDICATIONS  (STANDING):  ALBUTerol/ipratropium for Nebulization 3 milliLiter(s) Nebulizer every 6 hours  ascorbic acid 500 milliGRAM(s) Oral daily  aspirin enteric coated 81 milliGRAM(s) Oral daily  bisacodyl Suppository 10 milliGRAM(s) Rectal daily  calcium carbonate 1250 mG (OsCal) 1 Tablet(s) Oral two times a day  carvedilol 3.125 milliGRAM(s) Oral every 12 hours  dextrose 5% + sodium chloride 0.45%. 1000 milliLiter(s) (70 mL/Hr) IV Continuous <Continuous>  docusate sodium 300 milliGRAM(s) Oral daily  famotidine    Tablet 20 milliGRAM(s) Oral daily  heparin  Injectable 5000 Unit(s) SubCutaneous every 12 hours  lactulose Syrup 20 Gram(s) Oral daily  multivitamin 1 Tablet(s) Oral daily  piperacillin/tazobactam IVPB. 3.375 Gram(s) IV Intermittent every 12 hours  polyethylene glycol 3350 17 Gram(s) Oral daily  senna 2 Tablet(s) Oral two times a day    MEDICATIONS  (PRN):      Allergies    Allergy Status Unknown    Intolerances        LABS:                        12.1   8.7   )-----------( 220      ( 12 May 2018 07:24 )             36.8     05-12    141  |  106  |  4<L>  ----------------------------<  149<H>  3.6   |  27  |  0.65    Ca    8.2<L>      12 May 2018 07:24  Phos  2.7     05-12  Mg     2.1     05-12                CAPILLARY BLOOD GLUCOSE            RADIOLOGY & ADDITIONAL TESTS:    CXR:    Ct scan chest:    ekg;    echo: Patient is a 89y old  Female who presents with a chief complaint of failure to thrive (08 May 2018 18:14)    Awake , alert , comfortable, lying in bed in NAD , feels better , no new complains.    INTERVAL HPI/OVERNIGHT EVENTS:      VITAL SIGNS:  T(F): 98.7 (05-12-18 @ 14:11)  HR: 95 (05-12-18 @ 18:12)  BP: 143/85 (05-12-18 @ 18:12)  RR: 18 (05-12-18 @ 14:11)  SpO2: 100% (05-12-18 @ 14:11)  Wt(kg): --  I&O's Detail    11 May 2018 07:01  -  12 May 2018 07:00  --------------------------------------------------------  IN:  Total IN: 0 mL    OUT:    Indwelling Catheter - Urethral: 1300 mL    Stool: 2 mL  Total OUT: 1302 mL    Total NET: -1302 mL              REVIEW OF SYSTEMS:    CONSTITUTIONAL:  No fevers, chills, sweats    HEENT:  Eyes:  No diplopia or blurred vision. ENT:  No earache, sore throat or runny nose.    CARDIOVASCULAR:  No pressure, squeezing, tightness, or heaviness about the chest; no palpitations.    RESPIRATORY:  Per HPI    GASTROINTESTINAL:  No abdominal pain, nausea, vomiting or diarrhea.    GENITOURINARY:  No dysuria, frequency or urgency.    NEUROLOGIC:  No paresthesias, fasciculations, seizures or weakness.    PSYCHIATRIC:  No disorder of thought or mood.      PHYSICAL EXAM:    Constitutional: Well developed and nourished  Eyes:Perrla  ENMT: normal  Neck:supple  Respiratory: good air entry  Cardiovascular: S1 S2 regular  Gastrointestinal: Soft, Non tender  Extremities: No edema  Vascular:normal  Neurological:Awake, alert,Ox3  Musculoskeletal:Normal      MEDICATIONS  (STANDING):  ALBUTerol/ipratropium for Nebulization 3 milliLiter(s) Nebulizer every 6 hours  ascorbic acid 500 milliGRAM(s) Oral daily  aspirin enteric coated 81 milliGRAM(s) Oral daily  bisacodyl Suppository 10 milliGRAM(s) Rectal daily  calcium carbonate 1250 mG (OsCal) 1 Tablet(s) Oral two times a day  carvedilol 3.125 milliGRAM(s) Oral every 12 hours  dextrose 5% + sodium chloride 0.45%. 1000 milliLiter(s) (70 mL/Hr) IV Continuous <Continuous>  docusate sodium 300 milliGRAM(s) Oral daily  famotidine    Tablet 20 milliGRAM(s) Oral daily  heparin  Injectable 5000 Unit(s) SubCutaneous every 12 hours  lactulose Syrup 20 Gram(s) Oral daily  multivitamin 1 Tablet(s) Oral daily  piperacillin/tazobactam IVPB. 3.375 Gram(s) IV Intermittent every 12 hours  polyethylene glycol 3350 17 Gram(s) Oral daily  senna 2 Tablet(s) Oral two times a day    MEDICATIONS  (PRN):      Allergies    Allergy Status Unknown    Intolerances        LABS:                        12.1   8.7   )-----------( 220      ( 12 May 2018 07:24 )             36.8     05-12    141  |  106  |  4<L>  ----------------------------<  149<H>  3.6   |  27  |  0.65    Ca    8.2<L>      12 May 2018 07:24  Phos  2.7     05-12  Mg     2.1     05-12                CAPILLARY BLOOD GLUCOSE            RADIOLOGY & ADDITIONAL TESTS:    < from: Xray Abdomen 1 View PORTABLE -Urgent (05.10.18 @ 17:32) >  INTERPRETATION:  Portable supine abdominal study. 2 films submitted.   Patient has abdominal distention.    Some slight arterial calcifications arenoted. There is a to and fro   thoracolumbar curve with lower lumbar degeneration.    Abdominal gas pattern is unremarkable. No gross organomegaly is seen.    There are some slight infiltrate/scar changes at left base again seen.    IMPRESSION: As above.    < end of copied text >  CXR:    Ct scan chest:    ekg;    echo:

## 2018-05-12 NOTE — PROGRESS NOTE ADULT - SUBJECTIVE AND OBJECTIVE BOX
Meds:  piperacillin/tazobactam IVPB. 3.375 Gram(s) IV Intermittent every 12 hours    Allergies:  Allergies    Allergy Status Unknown    Intolerances        ROS  [  ] UNABLE TO ELICIT {very poor historian}    General:  [  ] None  [  ] Fever  [  ] Chills  [ x ] Malaise    Skin:  [ x ] None [  ] Rash  [  ] Wound  [  ] Ulcer    HEENT:  [ x ] None  [  ] Sore Throat  [  ] Nasal congestion/ runny nose  [  ] Photophobia [  ] Neck pain      Chest:  [ x ] None   [  ] SOB  [  ] Cough  [  ] None    Cardiovascular:   [ x ] None  [  ] CP  [  ] Palpitation    Gastrointestinal:  [ x ] None  [  ] Abd pain   [  ] Nausea    [  ] Vomiting   [  ] Diarrhea	     Genitourinary:  [ x ] None [  ] Polyuria   [  ] Urgency  [  ] Frequency  [  ] Dysuria    [  ]  Hematuria       Musculoskeletal:  [  ] None [  ] Back Pain	[  ] Body aches  [  ] Joint pain  [ x ] Weakness    Neurological: [  ] None [  ]Dizziness  [  ]Visual Disturbance  [  ]Headaches   [ x ] Weakness        PHYSICAL EXAM:  Vital Signs Last 24 Hrs  T(C): 36.9 (12 May 2018 05:05), Max: 37.6 (11 May 2018 20:35)  T(F): 98.5 (12 May 2018 05:05), Max: 99.6 (11 May 2018 20:35)  HR: 85 (12 May 2018 05:05) (83 - 94)  BP: 135/67 (12 May 2018 05:05) (107/61 - 135/67)  BP(mean): --  RR: 16 (12 May 2018 05:05) (16 - 18)  SpO2: 96% (12 May 2018 05:05) (95% - 97%)    Constitutional:    HEENT: [ x ] Wnl  [  ] Pharyngeal congestion    Neck:  [ x ] Supple  [  ]Lymphadenopathy  [ x ] No JVD   [  ] JVD  [  ] Masses   [  ] WNL    CHEST/Respiratory:  [  ]Clear to auscultation  [ x ] Rales   [  ] Rhonchi   [  ] Wheezing     [  ] Chest Tenderness      Cardiovascular:  [ x ] Reg S1 S2   [  ] Irreg S1 S2   [ x ]No Murmur  [  ] +ve Murmurs  [  ]Systolic [  ]Diastolic      Abdomen:  [ x ] Soft  [ x ] No tendrerness  [  ] Tenderness  [  ] Organomegaly  [  ] ABD Distention  [  ] Rigidity                       [ x ] No Regidity                       [ x ] No Rebound Tenderness  [  ] No Guarding Rigidity  [  ] Rebound Tenderness[  ] Guarding Rigidity                          [ x ]  +ve Bowel Sounds  [  ] Decreased Bowel Sounds    [  ] Absent Bowel Sounds  [ x ] Us catheter in place                          Extremities: [ x ] No edema [  ] Edema  [  ] Clubbing   [  ] Cyanosis                         [ x ] No Tender Calf muscles  [  ] Tender Calf muscles                        [ x ] Palpable peripheral pulses [ x ] Contracted right hand    Neurological: [ x ] Awake  [ x ] Alert  [ x ] Oriented  x  2                           [  ] Confused  [  ] Drowsy  [  ] respond to painful stimuli  [  ] Unresponsive    Skin:  [ x ] Intact [  ] Redness [  ] Thrombophlebitis  [  ] Rashes  [  ] Dry  [  ] Ulcers    Ortho:  [  ] Joint Swelling  [  ] Joint erythema [  ] Joint tenderness                [  ] Increased temp. to touch  [ x ] DJD [  ] WNL          LABS/DIAGNOSTIC TESTS                        12.1   8.7   )-----------( 220      ( 12 May 2018 07:24 )             36.8   05-12    141  |  106  |  4<L>  ----------------------------<  149<H>  3.6   |  27  |  0.65    Ca    8.2<L>      12 May 2018 07:24  Phos  2.7     05-12  Mg     2.1     05-12                CULTURES:   None yet.    RADIOLOGY:    EXAM:  XR ABDOMEN PORTABLE URGENT 1V                            PROCEDURE DATE:  05/10/2018          INTERPRETATION:  Portable supine abdominal study. 2 films submitted.   Patient has abdominal distention.    Some slight arterial calcifications arenoted. There is a to and fro   thoracolumbar curve with lower lumbar degeneration.    Abdominal gas pattern is unremarkable. No gross organomegaly is seen.    There are some slight infiltrate/scar changes at left base again seen.    IMPRESSION: As above.            EXAM:  CT ABDOMEN AND PELVIS                            PROCEDURE DATE:  05/08/2018          INTERPRETATION:  CLINICAL STATEMENT: abd pain, distension, r/o volvulus    TECHNIQUE: CT of the abdomen and pelvis was performed in the axial plane   utilizing thin slices without IV or oral contrast. Sagittal and coronal   reformatting was performed.    COMPARISON: None.    FINDINGS:    The lower chest demonstrates areas of linear scarring in the lingula and   right lower lobe.    The evaluation of theabdominal viscera and the bowel is limited without   contrast.    The liver is unremarkable. Cholelithiasis is seen.    The spleen, pancreas and adrenal glands are grossly unremarkable.    There is mild bilateral hydronephrosis or urinary calculus. There is a   small cyst in the lateral midpole of the right kidney. The bladder is   unremarkable    There is no bowel obstruction.  There is no intraperitoneal free air.    There is no free fluid.The appendix is seen. There is colonic   diverticulosis. There is stranding in the fat surrounding the sigmoid   colon in the lower abdomen in the midline suggestive of diverticulitis   without evidence of abscess or perforation    There is no abdominal or pelvic lymphadenopathy.  The pelvic structures   are grossly unremarkable.    The aorta is not aneurysmal. There is no significant retroperitoneal or   pelvic lymphadenopathy. Pelvic structures are remarkable for   calcifications in the uterus which may represent fibroids..    The bony structures demonstrate degenerative changes of the spine with   levoscoliosis of the lumbar spine.    IMPRESSION:  Sigmoid diverticulitis without abscess or perforation  Mild bilateral hydronephrosis may be due to distended bladder  Cholelithiasis        EXAM:  CT BRAIN                            PROCEDURE DATE:  05/08/2018          INTERPRETATION:  Head CT without IV contrast     Clinical Indication: Altered mental status    Technique: Axial CT images of the head are obtained from the skull base   to the vertex without IV contrast.    Comparison: 10/27/2017.    Findings: There is no acute intracranial hemorrhage. No CT evidence for   an acute territorial infarct. Stable patchy hypodensities in the   periventricular and subcortical white matterbilaterally, compatible with   chronic small vessel ischemic disease. Stable encephalomalacia in the   right cerebellum, compatible with an old infarct. There is no   space-occupying lesion, midline shift, or herniation. The ventricles   maintain normal size, shape, and location. No extra-axial fluid   collection.  The visualized paranasal sinuses and orbits appear grossly   unremarkable.    Impression: No acute intracranial hemorrhage. No CT evidence for an acute   territorial infarct.    Stable chronic small vessel ischemic disease and old infarct.    If clinically indicated, brain MR may be pursued for further evaluation.        Assessment and Recommendation:   89 y/o F pt with PMHx of systolic failure, HTN, COPD, and CVA BIB EMS from nursing home ([facility]) for AMS x 1 week and cough x 2 weeks. Per family, pt has been acting confused, lethargic, and has not been eating. Pt denies chest pain, difficulty breathing, abd pain, vomiting or any other complaints.  CT of abdomen showed Sigmoid diverticulitis without abscess or perforation, Mild bilateral hydronephrosis may be due to distended bladder, and Cholelithiasis  5/9/18 creatinine became normal.    Problem/Recommendation - 1:  Problem: Diverticulitis large intestine w/o perforation or abscess w/bleeding.   Recommendation:   1- Urine CS.  2- Blood culture, and Follow to final report.  3- Urine culture.  4- Continue IV Zosyn 3.375 gm IVPB q 12 hours.  5- Fluid and electrolytes management.  6- CBC and BMP follow up.   7- Will change to PO Cipro and Flagyl upon discharge to finish total 14 days of ABX therapy.    Problem/Recommendation - 2:  ·  Problem: Acute renal failure, unspecified acute renal failure type.    Recommendation:   1- As per problem # 1.  2- Urology management and follow up.     Problem/Recommendation - 3:  ·  Problem: Hydronephrosis, unspecified hydronephrosis type.    Recommendation:   1- As per problem # 1 & # 2.     Problem/Recommendation - 4:  ·  Problem: COPD (chronic obstructive pulmonary disease).    Recommendation:   1- Bronchodilators.  2- O2 as needed.  3- Pulmonary management, and follow up.  4- Steroids as per primary, and pulmonary team if needed.     Problem/Recommendation - 5:  ·  Problem: HTN (hypertension).    Recommendation:   1- Monitor Blood pressure closely.  2- Blood pressure control.  3- BP. meds as per cardiology and primary care team.     Discussed with medical resident..

## 2018-05-12 NOTE — PROGRESS NOTE ADULT - SUBJECTIVE AND OBJECTIVE BOX
Patient is a 89y old  Female who presents with a chief complaint of failure to thrive (08 May 2018 18:14)    pt seen in ed [  ], reg med floor [ x  ], bed [ x ], chair at bedside [   ], awake and responsive [ x ], lethargic [  ],  nad [ x ]    patrick [x]      Allergies    Allergy Status Unknown        Vitals    T(F): 98.5 (05-12-18 @ 05:05), Max: 99.6 (05-11-18 @ 20:35)  HR: 85 (05-12-18 @ 05:05) (83 - 94)  BP: 135/67 (05-12-18 @ 05:05) (107/61 - 135/67)  RR: 16 (05-12-18 @ 05:05) (16 - 18)  SpO2: 96% (05-12-18 @ 05:05) (95% - 97%)  Wt(kg): --  CAPILLARY BLOOD GLUCOSE          Labs                          11.9   10.4  )-----------( 223      ( 11 May 2018 06:59 )             38.0       05-11    141  |  106  |  8   ----------------------------<  52<L>  3.0<L>   |  29  |  0.55    Ca    8.0<L>      11 May 2018 06:59  Phos  1.8     05-11  Mg     1.5     05-11          Radiology Results          Meds    MEDICATIONS  (STANDING):  ALBUTerol/ipratropium for Nebulization 3 milliLiter(s) Nebulizer every 6 hours  ascorbic acid 500 milliGRAM(s) Oral daily  aspirin enteric coated 81 milliGRAM(s) Oral daily  bisacodyl Suppository 10 milliGRAM(s) Rectal daily  calcium carbonate 1250 mG (OsCal) 1 Tablet(s) Oral two times a day  carvedilol 3.125 milliGRAM(s) Oral every 12 hours  dextrose 5% + sodium chloride 0.45%. 1000 milliLiter(s) (70 mL/Hr) IV Continuous <Continuous>  docusate sodium 300 milliGRAM(s) Oral daily  famotidine    Tablet 20 milliGRAM(s) Oral daily  heparin  Injectable 5000 Unit(s) SubCutaneous every 12 hours  lactulose Syrup 20 Gram(s) Oral daily  multivitamin 1 Tablet(s) Oral daily  piperacillin/tazobactam IVPB. 3.375 Gram(s) IV Intermittent every 12 hours  polyethylene glycol 3350 17 Gram(s) Oral daily  senna 2 Tablet(s) Oral two times a day      MEDICATIONS  (PRN):      Physical Exam    Neuro :  right side weakness  Respiratory: CTA B/L  CV: RRR, S1S2, no murmurs,   Abdominal: Soft, ND +BS, mild left lower quadrant tender to deep palp  Extremities: No edema, + peripheral pulses    ASSESSMENT    encephalopathy likely 2nd to infectious process  diverticulitis  cholelithiasis  b/l hydronephrosis  s/p Acute renal failure  electrolyte imbalance  h/o CAD (coronary artery disease)  GERD (gastroesophageal reflux disease)  Dementia  CVA (cerebral vascular accident)  COPD (chronic obstructive pulmonary disease)  Heart failure, systolic  History of hypertension  No significant past surgical history      PLAN    ct head neg for acute ischemia noted above  ct abd pelv with sigmoid diverticulitis, hydronephrosis and cholelithiasis noted above  cont zosyn  id f/u noted  f/u blood cx  f/u stool studies  surg cons noted  no surgical intervention indicated at this time  d/c patrick with tov  renal sono neg for hydronephrosis noted   cont ivf  urine creat wnl  supplement potassium  pulm f/u  pft outpt  adv as tolerated  cont current meds Patient is a 89y old  Female who presents with a chief complaint of failure to thrive (08 May 2018 18:14)    pt seen in ed [  ], reg med floor [ x  ], bed [ x ], chair at bedside [   ], awake and responsive [ x ], lethargic [  ],  nad [ x ]    patrick [x]      Allergies    Allergy Status Unknown        Vitals    T(F): 98.5 (05-12-18 @ 05:05), Max: 99.6 (05-11-18 @ 20:35)  HR: 85 (05-12-18 @ 05:05) (83 - 94)  BP: 135/67 (05-12-18 @ 05:05) (107/61 - 135/67)  RR: 16 (05-12-18 @ 05:05) (16 - 18)  SpO2: 96% (05-12-18 @ 05:05) (95% - 97%)  Wt(kg): --  CAPILLARY BLOOD GLUCOSE          Labs                          11.9   10.4  )-----------( 223      ( 11 May 2018 06:59 )             38.0       05-11    141  |  106  |  8   ----------------------------<  52<L>  3.0<L>   |  29  |  0.55    Ca    8.0<L>      11 May 2018 06:59  Phos  1.8     05-11  Mg     1.5     05-11          Radiology Results          Meds    MEDICATIONS  (STANDING):  ALBUTerol/ipratropium for Nebulization 3 milliLiter(s) Nebulizer every 6 hours  ascorbic acid 500 milliGRAM(s) Oral daily  aspirin enteric coated 81 milliGRAM(s) Oral daily  bisacodyl Suppository 10 milliGRAM(s) Rectal daily  calcium carbonate 1250 mG (OsCal) 1 Tablet(s) Oral two times a day  carvedilol 3.125 milliGRAM(s) Oral every 12 hours  dextrose 5% + sodium chloride 0.45%. 1000 milliLiter(s) (70 mL/Hr) IV Continuous <Continuous>  docusate sodium 300 milliGRAM(s) Oral daily  famotidine    Tablet 20 milliGRAM(s) Oral daily  heparin  Injectable 5000 Unit(s) SubCutaneous every 12 hours  lactulose Syrup 20 Gram(s) Oral daily  multivitamin 1 Tablet(s) Oral daily  piperacillin/tazobactam IVPB. 3.375 Gram(s) IV Intermittent every 12 hours  polyethylene glycol 3350 17 Gram(s) Oral daily  senna 2 Tablet(s) Oral two times a day      MEDICATIONS  (PRN):      Physical Exam    Neuro :  right side weakness  Respiratory: CTA B/L  CV: RRR, S1S2, no murmurs,   Abdominal: Soft, ND +BS, nontender to palp  Extremities: No edema, + peripheral pulses    ASSESSMENT    encephalopathy likely 2nd to infectious process  diverticulitis  cholelithiasis  b/l hydronephrosis  s/p Acute renal failure  electrolyte imbalance  h/o CAD (coronary artery disease)  GERD (gastroesophageal reflux disease)  Dementia  CVA (cerebral vascular accident)  COPD (chronic obstructive pulmonary disease)  Heart failure, systolic  History of hypertension  No significant past surgical history      PLAN    ct head neg for acute ischemia noted above  ct abd pelv with sigmoid diverticulitis, hydronephrosis and cholelithiasis noted above  cont zosyn  id f/u noted  f/u blood cx  f/u stool studies  surg cons noted  no surgical intervention indicated at this time  d/c patrick with tov  renal sono neg for hydronephrosis noted   cont ivf  urine creat wnl  supplement potassium  pulm f/u  pft outpt  adv as tolerated  cont current meds

## 2018-05-12 NOTE — PROGRESS NOTE ADULT - SUBJECTIVE AND OBJECTIVE BOX
Late entry for 5/11/18.  Meds:  piperacillin/tazobactam IVPB. 3.375 Gram(s) IV Intermittent every 12 hours    Allergies:  Allergies    Allergy Status Unknown    Intolerances        ROS  [  ] UNABLE TO ELICIT {very poor historian}    General:  [  ] None  [  ] Fever  [  ] Chills  [ x ] Malaise    Skin:  [ x ] None [  ] Rash  [  ] Wound  [  ] Ulcer    HEENT:  [ x ] None  [  ] Sore Throat  [  ] Nasal congestion/ runny nose  [  ] Photophobia [  ] Neck pain      Chest:  [  ] None   [  ] SOB  [ x ] Cough  [  ] None    Cardiovascular:   [ x ] None  [  ] CP  [  ] Palpitation    Gastrointestinal:  [ x ] None  [  ] Abd pain   [  ] Nausea    [  ] Vomiting   [  ] Diarrhea	     Genitourinary:  [ x ] None [  ] Polyuria   [  ] Urgency  [  ] Frequency  [  ] Dysuria    [  ]  Hematuria       Musculoskeletal:  [  ] None [  ] Back Pain	[  ] Body aches  [  ] Joint pain  [ x ] Weakness    Neurological: [  ] None [  ]Dizziness  [  ]Visual Disturbance  [  ]Headaches   [ x ] Weakness        PHYSICAL EXAM:  Vital Signs Last 24 Hrs  T(C): 36.8 (10 May 2018 05:30), Max: 37.3 (09 May 2018 22:33)  T(F): 98.2 (10 May 2018 05:30), Max: 99.2 (09 May 2018 22:33)  HR: 75 (10 May 2018 05:30) (75 - 84)  BP: 132/75 (10 May 2018 05:30) (131/56 - 151/77)  BP(mean): --  RR: 16 (10 May 2018 05:30) (16 - 18)  SpO2: 96% (10 May 2018 05:30) (95% - 97%)    Constitutional:    HEENT: [ x ] Wnl  [  ] Pharyngeal congestion    Neck:  [ x ] Supple  [  ]Lymphadenopathy  [ x ] No JVD   [  ] JVD  [  ] Masses   [  ] WNL    CHEST/Respiratory:  [  ]Clear to auscultation  [ x ] Rales   [  ] Rhonchi   [  ] Wheezing     [  ] Chest Tenderness      Cardiovascular:  [ x ] Reg S1 S2   [  ] Irreg S1 S2   [ x ]No Murmur  [  ] +ve Murmurs  [  ]Systolic [  ]Diastolic      Abdomen:  [ x ] Soft  [ x ] No tendrerness  [  ] Tenderness  [  ] Organomegaly  [  ] ABD Distention  [  ] Rigidity                       [ x ] No Regidity                       [ x ] No Rebound Tenderness  [  ] No Guarding Rigidity  [  ] Rebound Tenderness[  ] Guarding Rigidity                          [ x ]  +ve Bowel Sounds  [  ] Decreased Bowel Sounds    [  ] Absent Bowel Sounds  [ x ] Us catheter in place                          Extremities: [ x ] No edema [  ] Edema  [  ] Clubbing   [  ] Cyanosis                         [ x ] No Tender Calf muscles  [  ] Tender Calf muscles                        [ x ] Palpable peripheral pulses [ x ] Contracted right hand    Neurological: [ x ] Awake  [ x ] Alert  [ x ] Oriented  x  2                           [  ] Confused  [  ] Drowsy  [  ] respond to painful stimuli  [  ] Unresponsive    Skin:  [ x ] Intact [  ] Redness [  ] Thrombophlebitis  [  ] Rashes  [  ] Dry  [  ] Ulcers    Ortho:  [  ] Joint Swelling  [  ] Joint erythema [  ] Joint tenderness                [  ] Increased temp. to touch  [ x ] DJD [  ] WNL          LABS/DIAGNOSTIC TESTS                          11.5   12.3  )-----------( 180      ( 09 May 2018 06:39 )             36.8         05-09    150<H>  |  117<H>  |  36<H>  ----------------------------<  142<H>  3.2<L>   |  29  |  0.88    Ca    8.0<L>      09 May 2018 06:39  Phos  2.6     05-09  Mg     2.8     05-09    TPro  7.1  /  Alb  2.3<L>  /  TBili  0.3  /  DBili  x   /  AST  19  /  ALT  11  /  AlkPhos  52  05-08      LIVER FUNCTIONS - ( 08 May 2018 15:20 )  Alb: 2.3 g/dL / Pro: 7.1 g/dL / ALK PHOS: 52 U/L / ALT: 11 U/L DA / AST: 19 U/L / GGT: x               CULTURES:   None yet.    RADIOLOGY:    EXAM:  XR ABDOMEN PORTABLE URGENT 1V                            PROCEDURE DATE:  05/10/2018          INTERPRETATION:  Portable supine abdominal study. 2 films submitted.   Patient has abdominal distention.    Some slight arterial calcifications arenoted. There is a to and fro   thoracolumbar curve with lower lumbar degeneration.    Abdominal gas pattern is unremarkable. No gross organomegaly is seen.    There are some slight infiltrate/scar changes at left base again seen.    IMPRESSION: As above.            EXAM:  CT ABDOMEN AND PELVIS                            PROCEDURE DATE:  05/08/2018          INTERPRETATION:  CLINICAL STATEMENT: abd pain, distension, r/o volvulus    TECHNIQUE: CT of the abdomen and pelvis was performed in the axial plane   utilizing thin slices without IV or oral contrast. Sagittal and coronal   reformatting was performed.    COMPARISON: None.    FINDINGS:    The lower chest demonstrates areas of linear scarring in the lingula and   right lower lobe.    The evaluation of theabdominal viscera and the bowel is limited without   contrast.    The liver is unremarkable. Cholelithiasis is seen.    The spleen, pancreas and adrenal glands are grossly unremarkable.    There is mild bilateral hydronephrosis or urinary calculus. There is a   small cyst in the lateral midpole of the right kidney. The bladder is   unremarkable    There is no bowel obstruction.  There is no intraperitoneal free air.    There is no free fluid.The appendix is seen. There is colonic   diverticulosis. There is stranding in the fat surrounding the sigmoid   colon in the lower abdomen in the midline suggestive of diverticulitis   without evidence of abscess or perforation    There is no abdominal or pelvic lymphadenopathy.  The pelvic structures   are grossly unremarkable.    The aorta is not aneurysmal. There is no significant retroperitoneal or   pelvic lymphadenopathy. Pelvic structures are remarkable for   calcifications in the uterus which may represent fibroids..    The bony structures demonstrate degenerative changes of the spine with   levoscoliosis of the lumbar spine.    IMPRESSION:  Sigmoid diverticulitis without abscess or perforation  Mild bilateral hydronephrosis may be due to distended bladder  Cholelithiasis        EXAM:  CT BRAIN                            PROCEDURE DATE:  05/08/2018          INTERPRETATION:  Head CT without IV contrast     Clinical Indication: Altered mental status    Technique: Axial CT images of the head are obtained from the skull base   to the vertex without IV contrast.    Comparison: 10/27/2017.    Findings: There is no acute intracranial hemorrhage. No CT evidence for   an acute territorial infarct. Stable patchy hypodensities in the   periventricular and subcortical white matterbilaterally, compatible with   chronic small vessel ischemic disease. Stable encephalomalacia in the   right cerebellum, compatible with an old infarct. There is no   space-occupying lesion, midline shift, or herniation. The ventricles   maintain normal size, shape, and location. No extra-axial fluid   collection.  The visualized paranasal sinuses and orbits appear grossly   unremarkable.    Impression: No acute intracranial hemorrhage. No CT evidence for an acute   territorial infarct.    Stable chronic small vessel ischemic disease and old infarct.    If clinically indicated, brain MR may be pursued for further evaluation.        Assessment and Recommendation:   89 y/o F pt with PMHx of systolic failure, HTN, COPD, and CVA BIB EMS from nursing home ([facility]) for AMS x 1 week and cough x 2 weeks. Per family, pt has been acting confused, lethargic, and has not been eating. Pt denies chest pain, difficulty breathing, abd pain, vomiting or any other complaints.  CT of abdomen showed Sigmoid diverticulitis without abscess or perforation, Mild bilateral hydronephrosis may be due to distended bladder, and Cholelithiasis  5/9/18 creatinine became normal.    Problem/Recommendation - 1:  Problem: Diverticulitis large intestine w/o perforation or abscess w/bleeding.   Recommendation:   1- Urine CS.  2- Blood culture, and Follow to final report.  3- Urine culture.  4- Continue IV Zosyn 3.375 gm IVPB q 12 hours.  5- Fluid and electrolytes management.  6- CBC and BMP follow up.     Problem/Recommendation - 2:  ·  Problem: Acute renal failure, unspecified acute renal failure type.    Recommendation:   1- As per problem # 1.  2- Urology management and follow up.     Problem/Recommendation - 3:  ·  Problem: Hydronephrosis, unspecified hydronephrosis type.    Recommendation:   1- As per problem # 1 & # 2.     Problem/Recommendation - 4:  ·  Problem: COPD (chronic obstructive pulmonary disease).    Recommendation:   1- Bronchodilators.  2- O2 as needed.  3- Pulmonary management, and follow up.  4- Steroids as per primary, and pulmonary team if needed.     Problem/Recommendation - 5:  ·  Problem: HTN (hypertension).    Recommendation:   1- Monitor Blood pressure closely.  2- Blood pressure control.  3- BP. meds as per cardiology and primary care team.     Discussed with medical resident..

## 2018-05-13 RX ADMIN — PIPERACILLIN AND TAZOBACTAM 25 GRAM(S): 4; .5 INJECTION, POWDER, LYOPHILIZED, FOR SOLUTION INTRAVENOUS at 17:52

## 2018-05-13 RX ADMIN — HEPARIN SODIUM 5000 UNIT(S): 5000 INJECTION INTRAVENOUS; SUBCUTANEOUS at 17:53

## 2018-05-13 RX ADMIN — CARVEDILOL PHOSPHATE 3.12 MILLIGRAM(S): 80 CAPSULE, EXTENDED RELEASE ORAL at 17:53

## 2018-05-13 RX ADMIN — POLYETHYLENE GLYCOL 3350 17 GRAM(S): 17 POWDER, FOR SOLUTION ORAL at 11:12

## 2018-05-13 RX ADMIN — Medication 3 MILLILITER(S): at 09:07

## 2018-05-13 RX ADMIN — HEPARIN SODIUM 5000 UNIT(S): 5000 INJECTION INTRAVENOUS; SUBCUTANEOUS at 05:21

## 2018-05-13 RX ADMIN — Medication 1 TABLET(S): at 05:22

## 2018-05-13 RX ADMIN — FAMOTIDINE 20 MILLIGRAM(S): 10 INJECTION INTRAVENOUS at 11:11

## 2018-05-13 RX ADMIN — Medication 300 MILLIGRAM(S): at 11:11

## 2018-05-13 RX ADMIN — LACTULOSE 20 GRAM(S): 10 SOLUTION ORAL at 11:11

## 2018-05-13 RX ADMIN — Medication 3 MILLILITER(S): at 20:32

## 2018-05-13 RX ADMIN — PIPERACILLIN AND TAZOBACTAM 25 GRAM(S): 4; .5 INJECTION, POWDER, LYOPHILIZED, FOR SOLUTION INTRAVENOUS at 05:21

## 2018-05-13 RX ADMIN — SENNA PLUS 2 TABLET(S): 8.6 TABLET ORAL at 17:53

## 2018-05-13 RX ADMIN — SENNA PLUS 2 TABLET(S): 8.6 TABLET ORAL at 05:21

## 2018-05-13 RX ADMIN — Medication 500 MILLIGRAM(S): at 11:11

## 2018-05-13 RX ADMIN — Medication 1 TABLET(S): at 17:53

## 2018-05-13 RX ADMIN — Medication 1 TABLET(S): at 11:11

## 2018-05-13 RX ADMIN — Medication 81 MILLIGRAM(S): at 11:11

## 2018-05-13 RX ADMIN — Medication 3 MILLILITER(S): at 14:46

## 2018-05-13 RX ADMIN — CARVEDILOL PHOSPHATE 3.12 MILLIGRAM(S): 80 CAPSULE, EXTENDED RELEASE ORAL at 05:21

## 2018-05-13 RX ADMIN — Medication 10 MILLIGRAM(S): at 11:11

## 2018-05-13 NOTE — PROGRESS NOTE ADULT - SUBJECTIVE AND OBJECTIVE BOX
Meds:  piperacillin/tazobactam IVPB. 3.375 Gram(s) IV Intermittent every 12 hours    Allergies:  Allergies    Allergy Status Unknown    Intolerances        ROS  [  ] UNABLE TO ELICIT {very poor historian}    General:  [  ] None  [  ] Fever  [  ] Chills  [ x ] Malaise    Skin:  [ x ] None [  ] Rash  [  ] Wound  [  ] Ulcer    HEENT:  [ x ] None  [  ] Sore Throat  [  ] Nasal congestion/ runny nose  [  ] Photophobia [  ] Neck pain      Chest:  [ x ] None   [  ] SOB  [  ] Cough  [  ] None    Cardiovascular:   [ x ] None  [  ] CP  [  ] Palpitation    Gastrointestinal:  [ x ] None  [  ] Abd pain   [  ] Nausea    [  ] Vomiting   [  ] Diarrhea	     Genitourinary:  [ x ] None [  ] Polyuria   [  ] Urgency  [  ] Frequency  [  ] Dysuria    [  ]  Hematuria       Musculoskeletal:  [  ] None [  ] Back Pain	[  ] Body aches  [  ] Joint pain  [ x ] Weakness    Neurological: [  ] None [  ]Dizziness  [  ]Visual Disturbance  [  ]Headaches   [ x ] Weakness        PHYSICAL EXAM:  Vital Signs Last 24 Hrs  T(C): 36.9 (13 May 2018 05:15), Max: 37.1 (12 May 2018 14:11)  T(F): 98.5 (13 May 2018 05:15), Max: 98.7 (12 May 2018 14:11)  HR: 94 (13 May 2018 05:15) (90 - 95)  BP: 131/82 (13 May 2018 05:15) (131/82 - 143/85)  BP(mean): --  RR: 18 (13 May 2018 05:15) (18 - 18)  SpO2: 95% (13 May 2018 05:15) (95% - 100%)    Constitutional:    HEENT: [ x ] Wnl  [  ] Pharyngeal congestion    Neck:  [ x ] Supple  [  ]Lymphadenopathy  [ x ] No JVD   [  ] JVD  [  ] Masses   [  ] WNL    CHEST/Respiratory:  [  ]Clear to auscultation  [ x ] Rales   [  ] Rhonchi   [  ] Wheezing     [  ] Chest Tenderness      Cardiovascular:  [ x ] Reg S1 S2   [  ] Irreg S1 S2   [ x ]No Murmur  [  ] +ve Murmurs  [  ]Systolic [  ]Diastolic      Abdomen:  [ x ] Soft  [ x ] No tendrerness  [  ] Tenderness  [  ] Organomegaly  [  ] ABD Distention  [  ] Rigidity                       [ x ] No Regidity                       [ x ] No Rebound Tenderness  [  ] No Guarding Rigidity  [  ] Rebound Tenderness[  ] Guarding Rigidity                          [ x ]  +ve Bowel Sounds  [  ] Decreased Bowel Sounds    [  ] Absent Bowel Sounds  [ x ] Us catheter in place                          Extremities: [ x ] No edema [  ] Edema  [  ] Clubbing   [  ] Cyanosis                         [ x ] No Tender Calf muscles  [  ] Tender Calf muscles                        [ x ] Palpable peripheral pulses [ x ] Contracted right hand    Neurological: [ x ] Awake  [ x ] Alert  [ x ] Oriented  x  2                           [  ] Confused  [  ] Drowsy  [  ] respond to painful stimuli  [  ] Unresponsive    Skin:  [ x ] Intact [  ] Redness [  ] Thrombophlebitis  [  ] Rashes  [  ] Dry  [  ] Ulcers    Ortho:  [  ] Joint Swelling  [  ] Joint erythema [  ] Joint tenderness                [  ] Increased temp. to touch  [ x ] DJD [  ] WNL          LABS/DIAGNOSTIC TESTS                        12.1   8.7   )-----------( 220      ( 12 May 2018 07:24 )             36.8   05-12    141  |  106  |  4<L>  ----------------------------<  149<H>  3.6   |  27  |  0.65    Ca    8.2<L>      12 May 2018 07:24  Phos  2.7     05-12  Mg     2.1     05-12                CULTURES:   Culture - Blood (05.11.18 @ 19:45)    Specimen Source: .Blood Blood-Peripheral    Culture Results:   No growth to date.    Culture - Blood (05.11.18 @ 19:45)    Specimen Source: .Blood Blood-Peripheral    Culture Results:   No growth to date.        RADIOLOGY:    EXAM:  XR ABDOMEN PORTABLE URGENT 1V                            PROCEDURE DATE:  05/10/2018          INTERPRETATION:  Portable supine abdominal study. 2 films submitted.   Patient has abdominal distention.    Some slight arterial calcifications arenoted. There is a to and fro   thoracolumbar curve with lower lumbar degeneration.    Abdominal gas pattern is unremarkable. No gross organomegaly is seen.    There are some slight infiltrate/scar changes at left base again seen.    IMPRESSION: As above.            EXAM:  CT ABDOMEN AND PELVIS                            PROCEDURE DATE:  05/08/2018          INTERPRETATION:  CLINICAL STATEMENT: abd pain, distension, r/o volvulus    TECHNIQUE: CT of the abdomen and pelvis was performed in the axial plane   utilizing thin slices without IV or oral contrast. Sagittal and coronal   reformatting was performed.    COMPARISON: None.    FINDINGS:    The lower chest demonstrates areas of linear scarring in the lingula and   right lower lobe.    The evaluation of theabdominal viscera and the bowel is limited without   contrast.    The liver is unremarkable. Cholelithiasis is seen.    The spleen, pancreas and adrenal glands are grossly unremarkable.    There is mild bilateral hydronephrosis or urinary calculus. There is a   small cyst in the lateral midpole of the right kidney. The bladder is   unremarkable    There is no bowel obstruction.  There is no intraperitoneal free air.    There is no free fluid.The appendix is seen. There is colonic   diverticulosis. There is stranding in the fat surrounding the sigmoid   colon in the lower abdomen in the midline suggestive of diverticulitis   without evidence of abscess or perforation    There is no abdominal or pelvic lymphadenopathy.  The pelvic structures   are grossly unremarkable.    The aorta is not aneurysmal. There is no significant retroperitoneal or   pelvic lymphadenopathy. Pelvic structures are remarkable for   calcifications in the uterus which may represent fibroids..    The bony structures demonstrate degenerative changes of the spine with   levoscoliosis of the lumbar spine.    IMPRESSION:  Sigmoid diverticulitis without abscess or perforation  Mild bilateral hydronephrosis may be due to distended bladder  Cholelithiasis        EXAM:  CT BRAIN                            PROCEDURE DATE:  05/08/2018          INTERPRETATION:  Head CT without IV contrast     Clinical Indication: Altered mental status    Technique: Axial CT images of the head are obtained from the skull base   to the vertex without IV contrast.    Comparison: 10/27/2017.    Findings: There is no acute intracranial hemorrhage. No CT evidence for   an acute territorial infarct. Stable patchy hypodensities in the   periventricular and subcortical white matterbilaterally, compatible with   chronic small vessel ischemic disease. Stable encephalomalacia in the   right cerebellum, compatible with an old infarct. There is no   space-occupying lesion, midline shift, or herniation. The ventricles   maintain normal size, shape, and location. No extra-axial fluid   collection.  The visualized paranasal sinuses and orbits appear grossly   unremarkable.    Impression: No acute intracranial hemorrhage. No CT evidence for an acute   territorial infarct.    Stable chronic small vessel ischemic disease and old infarct.    If clinically indicated, brain MR may be pursued for further evaluation.        Assessment and Recommendation:   89 y/o F pt with PMHx of systolic failure, HTN, COPD, and CVA BIB EMS from nursing home ([facility]) for AMS x 1 week and cough x 2 weeks. Per family, pt has been acting confused, lethargic, and has not been eating. Pt denies chest pain, difficulty breathing, abd pain, vomiting or any other complaints.  CT of abdomen showed Sigmoid diverticulitis without abscess or perforation, Mild bilateral hydronephrosis may be due to distended bladder, and Cholelithiasis  5/9/18 creatinine became normal.    Problem/Recommendation - 1:  Problem: Diverticulitis large intestine w/o perforation or abscess w/bleeding.   Recommendation:   1- Urine CS.  2- Blood culture, and Follow to final report (are still -ve).  3- Urine culture.  4- Continue IV Zosyn 3.375 gm IVPB q 12 hours.  5- Fluid and electrolytes management.  6- CBC and BMP follow up.   7- Will change to PO Cipro and Flagyl upon discharge to finish total 14 days of ABX therapy.    Problem/Recommendation - 2:  ·  Problem: Acute renal failure, unspecified acute renal failure type.    Recommendation:   1- As per problem # 1.  2- Urology management and follow up.     Problem/Recommendation - 3:  ·  Problem: Hydronephrosis, unspecified hydronephrosis type.    Recommendation:   1- As per problem # 1 & # 2.     Problem/Recommendation - 4:  ·  Problem: COPD (chronic obstructive pulmonary disease).    Recommendation:   1- Bronchodilators.  2- O2 as needed.  3- Pulmonary management, and follow up.  4- Steroids as per primary, and pulmonary team if needed.     Problem/Recommendation - 5:  ·  Problem: HTN (hypertension).    Recommendation:   1- Monitor Blood pressure closely.  2- Blood pressure control.  3- BP. meds as per cardiology and primary care team.     Discussed with medical resident.

## 2018-05-13 NOTE — PROGRESS NOTE ADULT - SUBJECTIVE AND OBJECTIVE BOX
Patient is a 89y old  Female who presents with a chief complaint of failure to thrive (08 May 2018 18:14)    pt seen in ed [  ], reg med floor [ x  ], bed [ x ], chair at bedside [   ], awake and responsive [ x ], lethargic [  ],  nad [ x ]    patrick [x]      Allergies    Allergy Status Unknown        Vitals    T(F): 98.5 (05-13-18 @ 05:15), Max: 98.7 (05-12-18 @ 14:11)  HR: 94 (05-13-18 @ 05:15) (90 - 95)  BP: 131/82 (05-13-18 @ 05:15) (131/82 - 143/85)  RR: 18 (05-13-18 @ 05:15) (18 - 18)  SpO2: 95% (05-13-18 @ 05:15) (95% - 100%)  Wt(kg): --  CAPILLARY BLOOD GLUCOSE          Labs                          12.1   8.7   )-----------( 220      ( 12 May 2018 07:24 )             36.8       05-12    141  |  106  |  4<L>  ----------------------------<  149<H>  3.6   |  27  |  0.65    Ca    8.2<L>      12 May 2018 07:24  Phos  2.7     05-12  Mg     2.1     05-12              .Blood Blood-Peripheral  05-11 @ 19:45   No growth to date.  --  --          Radiology Results      Meds    MEDICATIONS  (STANDING):  ALBUTerol/ipratropium for Nebulization 3 milliLiter(s) Nebulizer every 6 hours  ascorbic acid 500 milliGRAM(s) Oral daily  aspirin enteric coated 81 milliGRAM(s) Oral daily  bisacodyl Suppository 10 milliGRAM(s) Rectal daily  calcium carbonate 1250 mG (OsCal) 1 Tablet(s) Oral two times a day  carvedilol 3.125 milliGRAM(s) Oral every 12 hours  dextrose 5% + sodium chloride 0.45%. 1000 milliLiter(s) (70 mL/Hr) IV Continuous <Continuous>  docusate sodium 300 milliGRAM(s) Oral daily  famotidine    Tablet 20 milliGRAM(s) Oral daily  heparin  Injectable 5000 Unit(s) SubCutaneous every 12 hours  lactulose Syrup 20 Gram(s) Oral daily  multivitamin 1 Tablet(s) Oral daily  piperacillin/tazobactam IVPB. 3.375 Gram(s) IV Intermittent every 12 hours  polyethylene glycol 3350 17 Gram(s) Oral daily  senna 2 Tablet(s) Oral two times a day      MEDICATIONS  (PRN):      Physical Exam      Neuro :  right side weakness  Respiratory: CTA B/L  CV: RRR, S1S2, no murmurs,   Abdominal: Soft, ND +BS, nontender to palp  Extremities: No edema, + peripheral pulses    ASSESSMENT    encephalopathy likely 2nd to infectious process  diverticulitis  cholelithiasis  b/l hydronephrosis  s/p Acute renal failure  s/p electrolyte imbalance  h/o CAD (coronary artery disease)  GERD (gastroesophageal reflux disease)  Dementia  CVA (cerebral vascular accident)  COPD (chronic obstructive pulmonary disease)  Heart failure, systolic  History of hypertension  No significant past surgical history      PLAN    ct head neg for acute ischemia noted above  ct abd pelv with sigmoid diverticulitis, hydronephrosis and cholelithiasis noted above  cont zosyn  Will change to PO Cipro and Flagyl upon discharge to finish total 14 days of ABX therapy.  id f/u   blood cx neg  surg cons noted  no surgical intervention indicated at this time  d/c patrick with tov  renal sono neg for hydronephrosis noted   cont ivf  urine creat wnl  pulm f/u  pft outpt  adv diet  as tolerated  cont current meds  d/c paln for am if stable

## 2018-05-13 NOTE — PROGRESS NOTE ADULT - SUBJECTIVE AND OBJECTIVE BOX
Patient is a 89y old  Female who presents with a chief complaint of failure to thrive (08 May 2018 18:14)      INTERVAL HPI/OVERNIGHT EVENTS:      VITAL SIGNS:  T(F): 98.5 (05-13-18 @ 05:15)  HR: 94 (05-13-18 @ 05:15)  BP: 131/82 (05-13-18 @ 05:15)  RR: 18 (05-13-18 @ 05:15)  SpO2: 95% (05-13-18 @ 05:15)  Wt(kg): --  I&O's Detail    12 May 2018 07:01  -  13 May 2018 07:00  --------------------------------------------------------  IN:  Total IN: 0 mL    OUT:    Stool: 4 mL  Total OUT: 4 mL    Total NET: -4 mL              REVIEW OF SYSTEMS:    CONSTITUTIONAL:  No fevers, chills, sweats    HEENT:  Eyes:  No diplopia or blurred vision. ENT:  No earache, sore throat or runny nose.    CARDIOVASCULAR:  No pressure, squeezing, tightness, or heaviness about the chest; no palpitations.    RESPIRATORY:  Per HPI    GASTROINTESTINAL:  No abdominal pain, nausea, vomiting or diarrhea.    GENITOURINARY:  No dysuria, frequency or urgency.    NEUROLOGIC:  No paresthesias, fasciculations, seizures or weakness.    PSYCHIATRIC:  No disorder of thought or mood.      PHYSICAL EXAM:    Constitutional: Well developed and nourished  Eyes:Perrla  ENMT: normal  Neck:supple  Respiratory: good air entry  Cardiovascular: S1 S2 regular  Gastrointestinal: Soft, Non tender  Extremities: No edema  Vascular:normal  Neurological:Awake, alert,Ox3  Musculoskeletal:Normal      MEDICATIONS  (STANDING):  ALBUTerol/ipratropium for Nebulization 3 milliLiter(s) Nebulizer every 6 hours  ascorbic acid 500 milliGRAM(s) Oral daily  aspirin enteric coated 81 milliGRAM(s) Oral daily  bisacodyl Suppository 10 milliGRAM(s) Rectal daily  calcium carbonate 1250 mG (OsCal) 1 Tablet(s) Oral two times a day  carvedilol 3.125 milliGRAM(s) Oral every 12 hours  dextrose 5% + sodium chloride 0.45%. 1000 milliLiter(s) (70 mL/Hr) IV Continuous <Continuous>  docusate sodium 300 milliGRAM(s) Oral daily  famotidine    Tablet 20 milliGRAM(s) Oral daily  heparin  Injectable 5000 Unit(s) SubCutaneous every 12 hours  lactulose Syrup 20 Gram(s) Oral daily  multivitamin 1 Tablet(s) Oral daily  piperacillin/tazobactam IVPB. 3.375 Gram(s) IV Intermittent every 12 hours  polyethylene glycol 3350 17 Gram(s) Oral daily  senna 2 Tablet(s) Oral two times a day    MEDICATIONS  (PRN):      Allergies    Allergy Status Unknown    Intolerances        LABS:                        12.1   8.7   )-----------( 220      ( 12 May 2018 07:24 )             36.8     05-12    141  |  106  |  4<L>  ----------------------------<  149<H>  3.6   |  27  |  0.65    Ca    8.2<L>      12 May 2018 07:24  Phos  2.7     05-12  Mg     2.1     05-12                CAPILLARY BLOOD GLUCOSE            RADIOLOGY & ADDITIONAL TESTS:    CXR:  < from: Xray Chest 1 View AP/PA (10.27.17 @ 22:38) >    Right mid lung opacity. Dedicated chest CT recommended for further workup.    Findings discussed with Dr. Avendaño by Dr. Lowery on 10/28/2017 at 8:50 AM   at the time of interpretation,  with read back.    < end of copied text >      Ct scan chest:      ekg;    echo: Patient is a 89y old  Female who presents with a chief complaint of failure to thrive (08 May 2018 18:14)  Awake, alert, comfortable in bed in NAD.    INTERVAL HPI/OVERNIGHT EVENTS:      VITAL SIGNS:  T(F): 98.5 (05-13-18 @ 05:15)  HR: 94 (05-13-18 @ 05:15)  BP: 131/82 (05-13-18 @ 05:15)  RR: 18 (05-13-18 @ 05:15)  SpO2: 95% (05-13-18 @ 05:15)  Wt(kg): --  I&O's Detail    12 May 2018 07:01  -  13 May 2018 07:00  --------------------------------------------------------  IN:  Total IN: 0 mL    OUT:    Stool: 4 mL  Total OUT: 4 mL    Total NET: -4 mL              REVIEW OF SYSTEMS:    CONSTITUTIONAL:  No fevers, chills, sweats    HEENT:  Eyes:  No diplopia or blurred vision. ENT:  No earache, sore throat or runny nose.    CARDIOVASCULAR:  No pressure, squeezing, tightness, or heaviness about the chest; no palpitations.    RESPIRATORY:  Per HPI    GASTROINTESTINAL:  No abdominal pain, nausea, vomiting or diarrhea.    GENITOURINARY:  No dysuria, frequency or urgency.    NEUROLOGIC:  No paresthesias, fasciculations, seizures or weakness.    PSYCHIATRIC:  No disorder of thought or mood.      PHYSICAL EXAM:    Constitutional: Well developed and nourished  Eyes:Perrla  ENMT: normal  Neck:supple  Respiratory: good air entry  Cardiovascular: S1 S2 regular  Gastrointestinal: Soft, Non tender  Extremities: No edema  Vascular:normal  Neurological:Awake, alert,Ox3  Musculoskeletal:Normal      MEDICATIONS  (STANDING):  ALBUTerol/ipratropium for Nebulization 3 milliLiter(s) Nebulizer every 6 hours  ascorbic acid 500 milliGRAM(s) Oral daily  aspirin enteric coated 81 milliGRAM(s) Oral daily  bisacodyl Suppository 10 milliGRAM(s) Rectal daily  calcium carbonate 1250 mG (OsCal) 1 Tablet(s) Oral two times a day  carvedilol 3.125 milliGRAM(s) Oral every 12 hours  dextrose 5% + sodium chloride 0.45%. 1000 milliLiter(s) (70 mL/Hr) IV Continuous <Continuous>  docusate sodium 300 milliGRAM(s) Oral daily  famotidine    Tablet 20 milliGRAM(s) Oral daily  heparin  Injectable 5000 Unit(s) SubCutaneous every 12 hours  lactulose Syrup 20 Gram(s) Oral daily  multivitamin 1 Tablet(s) Oral daily  piperacillin/tazobactam IVPB. 3.375 Gram(s) IV Intermittent every 12 hours  polyethylene glycol 3350 17 Gram(s) Oral daily  senna 2 Tablet(s) Oral two times a day    MEDICATIONS  (PRN):      Allergies    Allergy Status Unknown    Intolerances        LABS:                        12.1   8.7   )-----------( 220      ( 12 May 2018 07:24 )             36.8     05-12    141  |  106  |  4<L>  ----------------------------<  149<H>  3.6   |  27  |  0.65    Ca    8.2<L>      12 May 2018 07:24  Phos  2.7     05-12  Mg     2.1     05-12                CAPILLARY BLOOD GLUCOSE            RADIOLOGY & ADDITIONAL TESTS:    CXR:  < from: Xray Chest 1 View AP/PA (10.27.17 @ 22:38) >    Right mid lung opacity. Dedicated chest CT recommended for further workup.    Findings discussed with Dr. Avendaño by Dr. Lowery on 10/28/2017 at 8:50 AM   at the time of interpretation,  with read back.    < end of copied text >      Ct scan chest:      ekg;    echo:

## 2018-05-14 LAB
CULTURE RESULTS: SIGNIFICANT CHANGE UP
SPECIMEN SOURCE: SIGNIFICANT CHANGE UP

## 2018-05-14 RX ORDER — MOXIFLOXACIN HYDROCHLORIDE TABLETS, 400 MG 400 MG/1
1 TABLET, FILM COATED ORAL
Qty: 0 | Refills: 0 | COMMUNITY
Start: 2018-05-14 | End: 2018-05-21

## 2018-05-14 RX ORDER — METRONIDAZOLE 500 MG
1 TABLET ORAL
Qty: 0 | Refills: 0 | COMMUNITY
Start: 2018-05-14 | End: 2018-05-21

## 2018-05-14 RX ORDER — LACTULOSE 10 G/15ML
30 SOLUTION ORAL
Qty: 0 | Refills: 0 | COMMUNITY

## 2018-05-14 RX ORDER — POLYETHYLENE GLYCOL 3350 17 G/17G
0 POWDER, FOR SOLUTION ORAL
Qty: 0 | Refills: 0 | COMMUNITY

## 2018-05-14 RX ADMIN — Medication 81 MILLIGRAM(S): at 11:57

## 2018-05-14 RX ADMIN — Medication 10 MILLIGRAM(S): at 11:57

## 2018-05-14 RX ADMIN — PIPERACILLIN AND TAZOBACTAM 25 GRAM(S): 4; .5 INJECTION, POWDER, LYOPHILIZED, FOR SOLUTION INTRAVENOUS at 17:38

## 2018-05-14 RX ADMIN — CARVEDILOL PHOSPHATE 3.12 MILLIGRAM(S): 80 CAPSULE, EXTENDED RELEASE ORAL at 17:38

## 2018-05-14 RX ADMIN — CARVEDILOL PHOSPHATE 3.12 MILLIGRAM(S): 80 CAPSULE, EXTENDED RELEASE ORAL at 06:09

## 2018-05-14 RX ADMIN — Medication 3 MILLILITER(S): at 02:16

## 2018-05-14 RX ADMIN — Medication 3 MILLILITER(S): at 20:24

## 2018-05-14 RX ADMIN — PIPERACILLIN AND TAZOBACTAM 25 GRAM(S): 4; .5 INJECTION, POWDER, LYOPHILIZED, FOR SOLUTION INTRAVENOUS at 06:05

## 2018-05-14 RX ADMIN — Medication 1 TABLET(S): at 11:57

## 2018-05-14 RX ADMIN — SODIUM CHLORIDE 70 MILLILITER(S): 9 INJECTION, SOLUTION INTRAVENOUS at 11:57

## 2018-05-14 RX ADMIN — SENNA PLUS 2 TABLET(S): 8.6 TABLET ORAL at 06:09

## 2018-05-14 RX ADMIN — Medication 3 MILLILITER(S): at 09:02

## 2018-05-14 RX ADMIN — HEPARIN SODIUM 5000 UNIT(S): 5000 INJECTION INTRAVENOUS; SUBCUTANEOUS at 17:38

## 2018-05-14 RX ADMIN — SENNA PLUS 2 TABLET(S): 8.6 TABLET ORAL at 17:37

## 2018-05-14 RX ADMIN — HEPARIN SODIUM 5000 UNIT(S): 5000 INJECTION INTRAVENOUS; SUBCUTANEOUS at 06:09

## 2018-05-14 RX ADMIN — SODIUM CHLORIDE 70 MILLILITER(S): 9 INJECTION, SOLUTION INTRAVENOUS at 22:02

## 2018-05-14 RX ADMIN — Medication 1 TABLET(S): at 06:09

## 2018-05-14 RX ADMIN — Medication 1 TABLET(S): at 17:37

## 2018-05-14 RX ADMIN — FAMOTIDINE 20 MILLIGRAM(S): 10 INJECTION INTRAVENOUS at 11:58

## 2018-05-14 RX ADMIN — Medication 500 MILLIGRAM(S): at 11:57

## 2018-05-14 RX ADMIN — LACTULOSE 20 GRAM(S): 10 SOLUTION ORAL at 11:57

## 2018-05-14 RX ADMIN — Medication 300 MILLIGRAM(S): at 12:09

## 2018-05-14 RX ADMIN — POLYETHYLENE GLYCOL 3350 17 GRAM(S): 17 POWDER, FOR SOLUTION ORAL at 11:57

## 2018-05-14 RX ADMIN — SODIUM CHLORIDE 70 MILLILITER(S): 9 INJECTION, SOLUTION INTRAVENOUS at 06:40

## 2018-05-14 NOTE — PROGRESS NOTE ADULT - SUBJECTIVE AND OBJECTIVE BOX
Meds:  piperacillin/tazobactam IVPB. 3.375 Gram(s) IV Intermittent every 12 hours    Allergies:  Allergies    Allergy Status Unknown    Intolerances        ROS  [  ] UNABLE TO ELICIT {very poor historian}    General:  [  ] None  [  ] Fever  [  ] Chills  [ x ] Malaise    Skin:  [ x ] None [  ] Rash  [  ] Wound  [  ] Ulcer    HEENT:  [ x ] None  [  ] Sore Throat  [  ] Nasal congestion/ runny nose  [  ] Photophobia [  ] Neck pain      Chest:  [ x ] None   [  ] SOB  [  ] Cough  [  ] None    Cardiovascular:   [ x ] None  [  ] CP  [  ] Palpitation    Gastrointestinal:  [ x ] None  [  ] Abd pain   [  ] Nausea    [  ] Vomiting   [  ] Diarrhea	     Genitourinary:  [ x ] None [  ] Polyuria   [  ] Urgency  [  ] Frequency  [  ] Dysuria    [  ]  Hematuria       Musculoskeletal:  [  ] None [  ] Back Pain	[  ] Body aches  [  ] Joint pain  [ x ] Weakness    Neurological: [  ] None [  ]Dizziness  [  ]Visual Disturbance  [  ]Headaches   [ x ] Weakness        PHYSICAL EXAM:  Vital Signs Last 24 Hrs  T(C): 37.1 (14 May 2018 05:20), Max: 37.8 (13 May 2018 20:43)  T(F): 98.8 (14 May 2018 05:20), Max: 100 (13 May 2018 20:43)  HR: 90 (14 May 2018 05:20) (85 - 90)  BP: 130/83 (14 May 2018 05:20) (120/77 - 141/63)  BP(mean): --  RR: 18 (14 May 2018 05:20) (18 - 18)  SpO2: 95% (14 May 2018 05:20) (93% - 98%)    Constitutional:    HEENT: [ x ] Wnl  [  ] Pharyngeal congestion    Neck:  [ x ] Supple  [  ]Lymphadenopathy  [ x ] No JVD   [  ] JVD  [  ] Masses   [  ] WNL    CHEST/Respiratory:  [  ]Clear to auscultation  [ x ] Rales   [  ] Rhonchi   [  ] Wheezing     [  ] Chest Tenderness      Cardiovascular:  [ x ] Reg S1 S2   [  ] Irreg S1 S2   [ x ]No Murmur  [  ] +ve Murmurs  [  ]Systolic [  ]Diastolic      Abdomen:  [ x ] Soft  [ x ] No tendrerness  [  ] Tenderness  [  ] Organomegaly  [  ] ABD Distention  [  ] Rigidity                       [ x ] No Regidity                       [ x ] No Rebound Tenderness  [  ] No Guarding Rigidity  [  ] Rebound Tenderness[  ] Guarding Rigidity                          [ x ]  +ve Bowel Sounds  [  ] Decreased Bowel Sounds    [  ] Absent Bowel Sounds  [ x ] Us catheter in place                          Extremities: [ x ] No edema [  ] Edema  [  ] Clubbing   [  ] Cyanosis                         [ x ] No Tender Calf muscles  [  ] Tender Calf muscles                        [ x ] Palpable peripheral pulses [ x ] Contracted right hand    Neurological: [ x ] Awake  [ x ] Alert  [ x ] Oriented  x  2                           [  ] Confused  [  ] Drowsy  [  ] respond to painful stimuli  [  ] Unresponsive    Skin:  [ x ] Intact [  ] Redness [  ] Thrombophlebitis  [  ] Rashes  [  ] Dry  [  ] Ulcers    Ortho:  [  ] Joint Swelling  [  ] Joint erythema [  ] Joint tenderness                [  ] Increased temp. to touch  [ x ] DJD [  ] WNL          LABS/DIAGNOSTIC TESTS                        12.1   8.7   )-----------( 220      ( 12 May 2018 07:24 )             36.8   05-12    141  |  106  |  4<L>  ----------------------------<  149<H>  3.6   |  27  |  0.65    Ca    8.2<L>      12 May 2018 07:24  Phos  2.7     05-12  Mg     2.1     05-12                CULTURES:   Culture - Stool (05.13.18 @ 02:09)    Specimen Source: .Stool Feces    Culture Results:   No enteric pathogens to date: Final culture pending      Culture - Blood (05.11.18 @ 19:45)    Specimen Source: .Blood Blood-Peripheral    Culture Results:   No growth to date.    Culture - Blood (05.11.18 @ 19:45)    Specimen Source: .Blood Blood-Peripheral    Culture Results:   No growth to date.        RADIOLOGY:    EXAM:  XR ABDOMEN PORTABLE URGENT 1V                            PROCEDURE DATE:  05/10/2018          INTERPRETATION:  Portable supine abdominal study. 2 films submitted.   Patient has abdominal distention.    Some slight arterial calcifications arenoted. There is a to and fro   thoracolumbar curve with lower lumbar degeneration.    Abdominal gas pattern is unremarkable. No gross organomegaly is seen.    There are some slight infiltrate/scar changes at left base again seen.    IMPRESSION: As above.            EXAM:  CT ABDOMEN AND PELVIS                            PROCEDURE DATE:  05/08/2018          INTERPRETATION:  CLINICAL STATEMENT: abd pain, distension, r/o volvulus    TECHNIQUE: CT of the abdomen and pelvis was performed in the axial plane   utilizing thin slices without IV or oral contrast. Sagittal and coronal   reformatting was performed.    COMPARISON: None.    FINDINGS:    The lower chest demonstrates areas of linear scarring in the lingula and   right lower lobe.    The evaluation of theabdominal viscera and the bowel is limited without   contrast.    The liver is unremarkable. Cholelithiasis is seen.    The spleen, pancreas and adrenal glands are grossly unremarkable.    There is mild bilateral hydronephrosis or urinary calculus. There is a   small cyst in the lateral midpole of the right kidney. The bladder is   unremarkable    There is no bowel obstruction.  There is no intraperitoneal free air.    There is no free fluid.The appendix is seen. There is colonic   diverticulosis. There is stranding in the fat surrounding the sigmoid   colon in the lower abdomen in the midline suggestive of diverticulitis   without evidence of abscess or perforation    There is no abdominal or pelvic lymphadenopathy.  The pelvic structures   are grossly unremarkable.    The aorta is not aneurysmal. There is no significant retroperitoneal or   pelvic lymphadenopathy. Pelvic structures are remarkable for   calcifications in the uterus which may represent fibroids..    The bony structures demonstrate degenerative changes of the spine with   levoscoliosis of the lumbar spine.    IMPRESSION:  Sigmoid diverticulitis without abscess or perforation  Mild bilateral hydronephrosis may be due to distended bladder  Cholelithiasis        EXAM:  CT BRAIN                            PROCEDURE DATE:  05/08/2018          INTERPRETATION:  Head CT without IV contrast     Clinical Indication: Altered mental status    Technique: Axial CT images of the head are obtained from the skull base   to the vertex without IV contrast.    Comparison: 10/27/2017.    Findings: There is no acute intracranial hemorrhage. No CT evidence for   an acute territorial infarct. Stable patchy hypodensities in the   periventricular and subcortical white matterbilaterally, compatible with   chronic small vessel ischemic disease. Stable encephalomalacia in the   right cerebellum, compatible with an old infarct. There is no   space-occupying lesion, midline shift, or herniation. The ventricles   maintain normal size, shape, and location. No extra-axial fluid   collection.  The visualized paranasal sinuses and orbits appear grossly   unremarkable.    Impression: No acute intracranial hemorrhage. No CT evidence for an acute   territorial infarct.    Stable chronic small vessel ischemic disease and old infarct.    If clinically indicated, brain MR may be pursued for further evaluation.        Assessment and Recommendation:   89 y/o F pt with PMHx of systolic failure, HTN, COPD, and CVA BIB EMS from nursing home ([facility]) for AMS x 1 week and cough x 2 weeks. Per family, pt has been acting confused, lethargic, and has not been eating. Pt denies chest pain, difficulty breathing, abd pain, vomiting or any other complaints.  CT of abdomen showed Sigmoid diverticulitis without abscess or perforation, Mild bilateral hydronephrosis may be due to distended bladder, and Cholelithiasis  5/9/18 creatinine became normal.    Problem/Recommendation - 1:  Problem: Diverticulitis large intestine w/o perforation or abscess w/bleeding.   Recommendation:   1- Urine CS.  2- Blood culture, and Follow to final report (are still -ve).  3- Urine culture.  4- Continue IV Zosyn, but increase dose to 3.375 gm IVPB q hours as renal functions improved.  5- Fluid and electrolytes management.  6- CBC and BMP follow up.   7- Will change to PO Cipro and Flagyl upon discharge to finish total 14 days of ABX therapy.    Problem/Recommendation - 2:  ·  Problem: Acute renal failure, unspecified acute renal failure type.    Recommendation:   1- As per problem # 1.  2- Urology management and follow up.     Problem/Recommendation - 3:  ·  Problem: Hydronephrosis, unspecified hydronephrosis type.    Recommendation:   1- As per problem # 1 & # 2.     Problem/Recommendation - 4:  ·  Problem: COPD (chronic obstructive pulmonary disease).    Recommendation:   1- Bronchodilators.  2- O2 as needed.  3- Pulmonary management, and follow up.  4- Steroids as per primary, and pulmonary team if needed.     Problem/Recommendation - 5:  ·  Problem: HTN (hypertension).    Recommendation:   1- Monitor Blood pressure closely.  2- Blood pressure control.  3- BP. meds as per cardiology and primary care team.     Discussed with medical resident.

## 2018-05-14 NOTE — DISCHARGE NOTE ADULT - CONDITIONS AT DISCHARGE
stable documents sent with Pt Left and Right IV Lock removed no s/s of infection noted see flow sheet for VS

## 2018-05-14 NOTE — DISCHARGE NOTE ADULT - HOSPITAL COURSE
89F from [facility] HTN, COPD, CVA w/ residual right sided weakness, HLD, Hyponatremia, CAD, Gastritis, mild cognitive impairment who comes to the hospital for failure to thrive and altered mental status. Patient found to have sigmoid diverticulitis, acute renal failure, hypernatremia, and failure to thrive       Problem/Plan - 1:  ·  Problem: Sigmoid diverticulitis.    upon admission -patient has elevated wbc and ct findings of infection  -will treat with zosyn day 3   surg consult - no intervention   abd xray - no SBo  BS +nt , non tender belly  last BM today  -monitor wbc count  -monitor for clinical improvement  started on clears  -hydrate with IV fluids.      Problem/Plan - 2:  ·  Problem: Acute renal failure, unspecified acute renal failure type.    Resolved     Problem/Plan - 3:  ·  Problem: Hydronephrosis, unspecified hydronephrosis type.    renal sono - hydronephrosis resolved     Problem/Plan - 4:  ·  Problem: Hypernatremia.    Im[proved     Problem/Plan - 5:  ·  Problem: Encephalopathy.  Plan: likely a combination of infection, acute renal failure and hyperNa  improved mental status  S. Na and Cr better,        Problem/Plan - 6:  Problem: COPD (chronic obstructive pulmonary disease). Plan: not in exacerbation  -will give bronchodilators q6h  -no need for steroids at this time. 89F from [facility] HTN, COPD, CVA w/ residual right sided weakness, HLD, Hyponatremia, CAD, Gastritis, mild cognitive impairment who comes to the hospital for failure to thrive and altered mental status. Patient found to have sigmoid diverticulitis, acute renal failure, hypernatremia, and failure to thrive      Sigmoid diverticulitis.    upon admission -patient has elevated wbc and ct findings of infection  -s/p IV zosyn x day 6  surg consult - no intervention   abd xray - no SBo  clinically improved diet advanced  will go home on 7 more days of cipro plus flagyl PO    Acute renal failure, unspecified acute renal failure type.    Resolved     Hydronephrosis, unspecified hydronephrosis type.    renal sono - hydronephrosis resolved    Hypernatremia.    Im[proved     Encephalopathy.  Plan: likely a combination of infection, acute renal failure and hyperNa  improved mental status  S. Na and Cr better,     COPD (chronic obstructive pulmonary disease). Plan: not in exacerbation  -will give bronchodilators q6h  -no need for steroids at this time.    Given patient's improved clinical status and current hemodynamic stability, decision was made to discharge to Sub acute Rehabilitation Please refer to patient's complete medical chart with documents for a full hospital course, for this is only a brief summary.

## 2018-05-14 NOTE — DISCHARGE NOTE ADULT - PLAN OF CARE
to resolve c/w antibiotics as prescribed  Take all medications as prescribed.  Seek medical assistance if you experience similar signs or symptoms as this hospitalization.  Follow up laboratory values and clinical status with your primary medical doctor within 10 days of hospital discharge Take all medications as prescribed.  Seek medical assistance if you experience similar signs or symptoms as this hospitalization.  Follow up laboratory values and clinical status with your primary medical doctor within 10 days of hospital discharge

## 2018-05-14 NOTE — DISCHARGE NOTE ADULT - MEDICATION SUMMARY - MEDICATIONS TO TAKE
I will START or STAY ON the medications listed below when I get home from the hospital:    Flagyl 500 mg oral tablet  -- 1 tab(s) by mouth 3 times a day  -- Indication: For Diverticulitis     Ecotrin Adult Low Strength 81 mg oral delayed release tablet  -- 1 tab(s) by mouth once a day  -- Indication: For Home meds    lisinopril 2.5 mg oral tablet  -- 1 tab(s) by mouth once a day  -- Indication: For HTN (hypertension)    calcium carbonate 1250 mg (500 mg elemental calcium) oral tablet  -- 1 tab(s) by mouth 2 times a day  -- Indication: For Home meds    Zocor 10 mg oral tablet  -- 1 tab(s) by mouth once a day (at bedtime)  -- Indication: For HLD    Coreg 3.125 mg oral tablet  -- 1 tab(s) by mouth 2 times a day  -- Indication: For HTN (hypertension)    DuoNeb 0.5 mg-2.5 mg/3 mL inhalation solution  -- 3 milliliter(s) inhaled 4 times a day  -- Indication: For SOB    Zantac 150 oral tablet  -- 1 tab(s) by mouth once a day (at bedtime)  -- Indication: For Home meds    Colace 100 mg oral capsule  -- 3 cap(s) by mouth once a day (at bedtime)  -- Indication: For COnstipation     Senna 8.6 mg oral tablet  -- 2 tab(s) by mouth 2 times a day  -- Indication: For COnstipation     Sodium Chloride 1 g oral tablet  -- 1 tab(s) by mouth once a day  -- Indication: For Home meds    Cipro 500 mg oral tablet  -- 1 tab(s) by mouth every 12 hours  -- Indication: For Diverticulitis     Multiple Vitamins oral tablet  -- 1 tab(s) by mouth once a day  -- Indication: For Supplements    Vitamin C 500 mg oral tablet  -- 1 tab(s) by mouth once a day  -- Indication: For Supplements

## 2018-05-14 NOTE — PROGRESS NOTE ADULT - PROBLEM SELECTOR PLAN 5
cont antibiotics  Surgical follow up  ID follow up. cont antibiotics po  Surgical follow up  ID follow up.  DC planning

## 2018-05-14 NOTE — DISCHARGE NOTE ADULT - SECONDARY DIAGNOSIS.
Acute renal failure, unspecified acute renal failure type Hydronephrosis, unspecified hydronephrosis type HTN (hypertension)

## 2018-05-14 NOTE — DISCHARGE NOTE ADULT - CARE PLAN
Principal Discharge DX:	Diverticulitis large intestine w/o perforation or abscess w/bleeding  Secondary Diagnosis:	Acute renal failure, unspecified acute renal failure type  Secondary Diagnosis:	Hydronephrosis, unspecified hydronephrosis type  Secondary Diagnosis:	HTN (hypertension) Principal Discharge DX:	Diverticulitis large intestine w/o perforation or abscess w/bleeding  Goal:	to resolve  Assessment and plan of treatment:	c/w antibiotics as prescribed  Take all medications as prescribed.  Seek medical assistance if you experience similar signs or symptoms as this hospitalization.  Follow up laboratory values and clinical status with your primary medical doctor within 10 days of hospital discharge  Secondary Diagnosis:	HTN (hypertension)  Assessment and plan of treatment:	Take all medications as prescribed.  Seek medical assistance if you experience similar signs or symptoms as this hospitalization.  Follow up laboratory values and clinical status with your primary medical doctor within 10 days of hospital discharge

## 2018-05-14 NOTE — DISCHARGE NOTE ADULT - MEDICATION SUMMARY - MEDICATIONS TO STOP TAKING
I will STOP taking the medications listed below when I get home from the hospital:    Dulcolax Laxative 10 mg rectal suppository  -- 1 suppository(ies) rectally once a day    MiraLax oral powder for reconstitution    lactulose 10 g/15 mL oral syrup  -- 30 milliliter(s) by mouth once a day

## 2018-05-14 NOTE — DISCHARGE NOTE ADULT - PATIENT PORTAL LINK FT
You can access the [facility], offered by [facility], by registering with the following website: [email or web address]

## 2018-05-14 NOTE — PROGRESS NOTE ADULT - SUBJECTIVE AND OBJECTIVE BOX
Patient is a 89y old  Female who presents with a chief complaint of failure to thrive.   Awake, alert, comfortable in bed in NAD.      INTERVAL HPI/OVERNIGHT EVENTS:      VITAL SIGNS:  T(F): 98.8 (05-14-18 @ 05:20)  HR: 90 (05-14-18 @ 05:20)  BP: 130/83 (05-14-18 @ 05:20)  RR: 18 (05-14-18 @ 05:20)  SpO2: 95% (05-14-18 @ 05:20)  Wt(kg): --  I&O's Detail    13 May 2018 07:01  -  14 May 2018 07:00  --------------------------------------------------------  IN:  Total IN: 0 mL    OUT:    Stool: 3 mL  Total OUT: 3 mL    Total NET: -3 mL              REVIEW OF SYSTEMS:    CONSTITUTIONAL:  No fevers, chills, sweats    HEENT:  Eyes:  No diplopia or blurred vision. ENT:  No earache, sore throat or runny nose.    CARDIOVASCULAR:  No pressure, squeezing, tightness, or heaviness about the chest; no palpitations.    RESPIRATORY:  Per HPI    GASTROINTESTINAL:  No abdominal pain, nausea, vomiting or diarrhea.    GENITOURINARY:  No dysuria, frequency or urgency.    NEUROLOGIC:  No paresthesias, fasciculations, seizures or weakness.    PSYCHIATRIC:  No disorder of thought or mood.      PHYSICAL EXAM:    Constitutional: Well developed and nourished  Eyes:Perrla  ENMT: normal  Neck:supple  Respiratory: good air entry  Cardiovascular: S1 S2 regular  Gastrointestinal: Soft, Non tender  Extremities: No edema  Vascular:normal  Neurological:Awake, alert,Ox3  Musculoskeletal:Normal      MEDICATIONS  (STANDING):  ALBUTerol/ipratropium for Nebulization 3 milliLiter(s) Nebulizer every 6 hours  ascorbic acid 500 milliGRAM(s) Oral daily  aspirin enteric coated 81 milliGRAM(s) Oral daily  bisacodyl Suppository 10 milliGRAM(s) Rectal daily  calcium carbonate 1250 mG (OsCal) 1 Tablet(s) Oral two times a day  carvedilol 3.125 milliGRAM(s) Oral every 12 hours  dextrose 5% + sodium chloride 0.45%. 1000 milliLiter(s) (70 mL/Hr) IV Continuous <Continuous>  docusate sodium 300 milliGRAM(s) Oral daily  famotidine    Tablet 20 milliGRAM(s) Oral daily  heparin  Injectable 5000 Unit(s) SubCutaneous every 12 hours  lactulose Syrup 20 Gram(s) Oral daily  multivitamin 1 Tablet(s) Oral daily  piperacillin/tazobactam IVPB. 3.375 Gram(s) IV Intermittent every 12 hours  polyethylene glycol 3350 17 Gram(s) Oral daily  senna 2 Tablet(s) Oral two times a day    MEDICATIONS  (PRN):      Allergies    Allergy Status Unknown    Intolerances        LABS:                    CAPILLARY BLOOD GLUCOSE            RADIOLOGY & ADDITIONAL TESTS:    CXR:    Ct scan chest:    ekg;    echo: Patient is a 89y old  Female who presents with a chief complaint of failure to thrive.   Awake, alert, comfortable in bed in NAD. Clinically improved. Switched to po antibiotics.      INTERVAL HPI/OVERNIGHT EVENTS:      VITAL SIGNS:  T(F): 98.8 (05-14-18 @ 05:20)  HR: 90 (05-14-18 @ 05:20)  BP: 130/83 (05-14-18 @ 05:20)  RR: 18 (05-14-18 @ 05:20)  SpO2: 95% (05-14-18 @ 05:20)  Wt(kg): --  I&O's Detail    13 May 2018 07:01  -  14 May 2018 07:00  --------------------------------------------------------  IN:  Total IN: 0 mL    OUT:    Stool: 3 mL  Total OUT: 3 mL    Total NET: -3 mL              REVIEW OF SYSTEMS:    CONSTITUTIONAL:  No fevers, chills, sweats    HEENT:  Eyes:  No diplopia or blurred vision. ENT:  No earache, sore throat or runny nose.    CARDIOVASCULAR:  No pressure, squeezing, tightness, or heaviness about the chest; no palpitations.    RESPIRATORY:  Per HPI    GASTROINTESTINAL:  No abdominal pain, nausea, vomiting or diarrhea.    GENITOURINARY:  No dysuria, frequency or urgency.    NEUROLOGIC:  No paresthesias, fasciculations, seizures or weakness.    PSYCHIATRIC:  No disorder of thought or mood.      PHYSICAL EXAM:    Constitutional: Well developed and nourished  Eyes:Perrla  ENMT: normal  Neck:supple  Respiratory: good air entry  Cardiovascular: S1 S2 regular  Gastrointestinal: Soft, Non tender  Extremities: No edema  Vascular:normal  Neurological:Awake, alert,Ox3  Musculoskeletal:Normal      MEDICATIONS  (STANDING):  ALBUTerol/ipratropium for Nebulization 3 milliLiter(s) Nebulizer every 6 hours  ascorbic acid 500 milliGRAM(s) Oral daily  aspirin enteric coated 81 milliGRAM(s) Oral daily  bisacodyl Suppository 10 milliGRAM(s) Rectal daily  calcium carbonate 1250 mG (OsCal) 1 Tablet(s) Oral two times a day  carvedilol 3.125 milliGRAM(s) Oral every 12 hours  dextrose 5% + sodium chloride 0.45%. 1000 milliLiter(s) (70 mL/Hr) IV Continuous <Continuous>  docusate sodium 300 milliGRAM(s) Oral daily  famotidine    Tablet 20 milliGRAM(s) Oral daily  heparin  Injectable 5000 Unit(s) SubCutaneous every 12 hours  lactulose Syrup 20 Gram(s) Oral daily  multivitamin 1 Tablet(s) Oral daily  piperacillin/tazobactam IVPB. 3.375 Gram(s) IV Intermittent every 12 hours  polyethylene glycol 3350 17 Gram(s) Oral daily  senna 2 Tablet(s) Oral two times a day    MEDICATIONS  (PRN):      Allergies    Allergy Status Unknown    Intolerances        LABS:                    CAPILLARY BLOOD GLUCOSE            RADIOLOGY & ADDITIONAL TESTS:    CXR:    Ct scan chest:    ekg;    echo:

## 2018-05-14 NOTE — PROGRESS NOTE ADULT - SUBJECTIVE AND OBJECTIVE BOX
Patient is a 89y old  Female who presents with a chief complaint of failure to thrive (08 May 2018 18:14)    pt seen in ed [  ], reg med floor [ x  ], bed [ x ], chair at bedside [   ], awake and responsive [ x ], lethargic [  ],  nad [ x ]      Allergies    Allergy Status Unknown        Vitals    T(F): 98.8 (05-14-18 @ 05:20), Max: 100 (05-13-18 @ 20:43)  HR: 90 (05-14-18 @ 05:20) (85 - 90)  BP: 130/83 (05-14-18 @ 05:20) (120/77 - 141/63)  RR: 18 (05-14-18 @ 05:20) (18 - 18)  SpO2: 95% (05-14-18 @ 05:20) (93% - 98%)  Wt(kg): --  CAPILLARY BLOOD GLUCOSE          Labs          .Stool Feces  05-13 @ 02:09   No enteric pathogens to date: Final culture pending  --  --      .Blood Blood-Peripheral  05-11 @ 19:45   No growth to date.  --  --          Radiology Results      Meds    MEDICATIONS  (STANDING):  ALBUTerol/ipratropium for Nebulization 3 milliLiter(s) Nebulizer every 6 hours  ascorbic acid 500 milliGRAM(s) Oral daily  aspirin enteric coated 81 milliGRAM(s) Oral daily  bisacodyl Suppository 10 milliGRAM(s) Rectal daily  calcium carbonate 1250 mG (OsCal) 1 Tablet(s) Oral two times a day  carvedilol 3.125 milliGRAM(s) Oral every 12 hours  dextrose 5% + sodium chloride 0.45%. 1000 milliLiter(s) (70 mL/Hr) IV Continuous <Continuous>  docusate sodium 300 milliGRAM(s) Oral daily  famotidine    Tablet 20 milliGRAM(s) Oral daily  heparin  Injectable 5000 Unit(s) SubCutaneous every 12 hours  lactulose Syrup 20 Gram(s) Oral daily  multivitamin 1 Tablet(s) Oral daily  piperacillin/tazobactam IVPB. 3.375 Gram(s) IV Intermittent every 12 hours  polyethylene glycol 3350 17 Gram(s) Oral daily  senna 2 Tablet(s) Oral two times a day      MEDICATIONS  (PRN):      Physical Exam    Neuro :  right side weakness  Respiratory: CTA B/L  CV: RRR, S1S2, no murmurs,   Abdominal: Soft, ND +BS, nontender to palp  Extremities: No edema, + peripheral pulses    ASSESSMENT    encephalopathy likely 2nd to infectious process  diverticulitis  cholelithiasis  b/l hydronephrosis  s/p Acute renal failure  s/p electrolyte imbalance  h/o CAD (coronary artery disease)  GERD (gastroesophageal reflux disease)  Dementia  CVA (cerebral vascular accident)  COPD (chronic obstructive pulmonary disease)  Heart failure, systolic  History of hypertension  No significant past surgical history      PLAN    ct head neg for acute ischemia noted above  ct abd pelv with sigmoid diverticulitis, hydronephrosis and cholelithiasis noted above  change abx to PO Cipro and Flagyl to finish total 14 days of ABX therapy.  id f/u   blood cx neg  surg cons noted  no surgical intervention indicated at this time  d/c patrick with tov  renal sono neg for hydronephrosis noted   urine creat wnl  pulm f/u  pft outpt  adv diet  as tolerated  cont current meds  pt stable for d/c Patient is a 89y old  Female who presents with a chief complaint of failure to thrive (08 May 2018 18:14)    pt seen in ed [  ], reg med floor [ x  ], bed [ x ], chair at bedside [   ], awake and responsive [ x ], lethargic [  ],  nad [ x ]      Allergies    Allergy Status Unknown          Vitals    T(F): 98.8 (05-14-18 @ 05:20), Max: 100 (05-13-18 @ 20:43)  HR: 90 (05-14-18 @ 05:20) (85 - 90)  BP: 130/83 (05-14-18 @ 05:20) (120/77 - 141/63)  RR: 18 (05-14-18 @ 05:20) (18 - 18)  SpO2: 95% (05-14-18 @ 05:20) (93% - 98%)  Wt(kg): --  CAPILLARY BLOOD GLUCOSE          Labs          .Stool Feces  05-13 @ 02:09   No enteric pathogens to date: Final culture pending  --  --      .Blood Blood-Peripheral  05-11 @ 19:45   No growth to date.  --  --          Radiology Results      Meds    MEDICATIONS  (STANDING):  ALBUTerol/ipratropium for Nebulization 3 milliLiter(s) Nebulizer every 6 hours  ascorbic acid 500 milliGRAM(s) Oral daily  aspirin enteric coated 81 milliGRAM(s) Oral daily  bisacodyl Suppository 10 milliGRAM(s) Rectal daily  calcium carbonate 1250 mG (OsCal) 1 Tablet(s) Oral two times a day  carvedilol 3.125 milliGRAM(s) Oral every 12 hours  dextrose 5% + sodium chloride 0.45%. 1000 milliLiter(s) (70 mL/Hr) IV Continuous <Continuous>  docusate sodium 300 milliGRAM(s) Oral daily  famotidine    Tablet 20 milliGRAM(s) Oral daily  heparin  Injectable 5000 Unit(s) SubCutaneous every 12 hours  lactulose Syrup 20 Gram(s) Oral daily  multivitamin 1 Tablet(s) Oral daily  piperacillin/tazobactam IVPB. 3.375 Gram(s) IV Intermittent every 12 hours  polyethylene glycol 3350 17 Gram(s) Oral daily  senna 2 Tablet(s) Oral two times a day      MEDICATIONS  (PRN):      Physical Exam    Neuro :  right side weakness  Respiratory: CTA B/L  CV: RRR, S1S2, no murmurs,   Abdominal: Soft, ND +BS, nontender to palp  Extremities: No edema, + peripheral pulses    ASSESSMENT    encephalopathy likely 2nd to infectious process  diverticulitis  cholelithiasis  b/l hydronephrosis  s/p Acute renal failure  s/p electrolyte imbalance  h/o CAD (coronary artery disease)  GERD (gastroesophageal reflux disease)  Dementia  CVA (cerebral vascular accident)  COPD (chronic obstructive pulmonary disease)  Heart failure, systolic  History of hypertension  No significant past surgical history      PLAN    ct head neg for acute ischemia noted above  ct abd pelv with sigmoid diverticulitis, hydronephrosis and cholelithiasis noted above  change abx to PO Cipro and Flagyl to finish total 14 days of ABX therapy.  id f/u   blood cx neg  surg cons noted  no surgical intervention indicated at this time  d/c patrick with tov  renal sono neg for hydronephrosis noted   urine creat wnl  pulm f/u  pft outpt  adv diet  as tolerated  cont current meds  pt stable for d/c

## 2018-05-15 RX ADMIN — Medication 300 MILLIGRAM(S): at 11:52

## 2018-05-15 RX ADMIN — Medication 10 MILLIGRAM(S): at 11:52

## 2018-05-15 RX ADMIN — LACTULOSE 20 GRAM(S): 10 SOLUTION ORAL at 11:56

## 2018-05-15 RX ADMIN — SODIUM CHLORIDE 70 MILLILITER(S): 9 INJECTION, SOLUTION INTRAVENOUS at 06:43

## 2018-05-15 RX ADMIN — FAMOTIDINE 20 MILLIGRAM(S): 10 INJECTION INTRAVENOUS at 11:53

## 2018-05-15 RX ADMIN — Medication 81 MILLIGRAM(S): at 11:51

## 2018-05-15 RX ADMIN — CARVEDILOL PHOSPHATE 3.12 MILLIGRAM(S): 80 CAPSULE, EXTENDED RELEASE ORAL at 17:39

## 2018-05-15 RX ADMIN — Medication 1 TABLET(S): at 17:39

## 2018-05-15 RX ADMIN — SODIUM CHLORIDE 70 MILLILITER(S): 9 INJECTION, SOLUTION INTRAVENOUS at 11:52

## 2018-05-15 RX ADMIN — Medication 3 MILLILITER(S): at 15:12

## 2018-05-15 RX ADMIN — Medication 1 TABLET(S): at 06:05

## 2018-05-15 RX ADMIN — Medication 3 MILLILITER(S): at 08:47

## 2018-05-15 RX ADMIN — Medication 1 TABLET(S): at 11:51

## 2018-05-15 RX ADMIN — SENNA PLUS 2 TABLET(S): 8.6 TABLET ORAL at 17:38

## 2018-05-15 RX ADMIN — PIPERACILLIN AND TAZOBACTAM 25 GRAM(S): 4; .5 INJECTION, POWDER, LYOPHILIZED, FOR SOLUTION INTRAVENOUS at 06:06

## 2018-05-15 RX ADMIN — PIPERACILLIN AND TAZOBACTAM 25 GRAM(S): 4; .5 INJECTION, POWDER, LYOPHILIZED, FOR SOLUTION INTRAVENOUS at 17:37

## 2018-05-15 RX ADMIN — Medication 3 MILLILITER(S): at 03:22

## 2018-05-15 RX ADMIN — HEPARIN SODIUM 5000 UNIT(S): 5000 INJECTION INTRAVENOUS; SUBCUTANEOUS at 06:05

## 2018-05-15 RX ADMIN — POLYETHYLENE GLYCOL 3350 17 GRAM(S): 17 POWDER, FOR SOLUTION ORAL at 11:52

## 2018-05-15 RX ADMIN — SENNA PLUS 2 TABLET(S): 8.6 TABLET ORAL at 06:07

## 2018-05-15 RX ADMIN — HEPARIN SODIUM 5000 UNIT(S): 5000 INJECTION INTRAVENOUS; SUBCUTANEOUS at 17:39

## 2018-05-15 RX ADMIN — CARVEDILOL PHOSPHATE 3.12 MILLIGRAM(S): 80 CAPSULE, EXTENDED RELEASE ORAL at 06:05

## 2018-05-15 RX ADMIN — Medication 500 MILLIGRAM(S): at 11:51

## 2018-05-15 RX ADMIN — Medication 3 MILLILITER(S): at 20:30

## 2018-05-15 NOTE — PROGRESS NOTE ADULT - SUBJECTIVE AND OBJECTIVE BOX
Patient is a 89y old  Female who presents with a chief complaint of failure to thrive (14 May 2018 10:37)  Awake, alert, comfortable in bed in NAD    INTERVAL HPI/OVERNIGHT EVENTS:      VITAL SIGNS:  T(F): 98.7 (05-15-18 @ 13:28)  HR: 101 (05-15-18 @ 13:28)  BP: 149/86 (05-15-18 @ 13:28)  RR: 18 (05-15-18 @ 13:28)  SpO2: 93% (05-15-18 @ 13:28)  Wt(kg): --  I&O's Detail    14 May 2018 07:01  -  15 May 2018 07:00  --------------------------------------------------------  IN:  Total IN: 0 mL    OUT:    Stool: 1 mL  Total OUT: 1 mL    Total NET: -1 mL              REVIEW OF SYSTEMS:    CONSTITUTIONAL:  No fevers, chills, sweats    HEENT:  Eyes:  No diplopia or blurred vision. ENT:  No earache, sore throat or runny nose.    CARDIOVASCULAR:  No pressure, squeezing, tightness, or heaviness about the chest; no palpitations.    RESPIRATORY:  Per HPI    GASTROINTESTINAL:  No abdominal pain, nausea, vomiting or diarrhea.    GENITOURINARY:  No dysuria, frequency or urgency.    NEUROLOGIC:  No paresthesias, fasciculations, seizures or weakness.    PSYCHIATRIC:  No disorder of thought or mood.      PHYSICAL EXAM:    Constitutional: Well developed and nourished  Eyes:Perrla  ENMT: normal  Neck:supple  Respiratory: good air entry  Cardiovascular: S1 S2 regular  Gastrointestinal: Soft, Non tender  Extremities: No edema  Vascular:normal  Neurological:Awake, alert  Musculoskeletal:Normal      MEDICATIONS  (STANDING):  ALBUTerol/ipratropium for Nebulization 3 milliLiter(s) Nebulizer every 6 hours  ascorbic acid 500 milliGRAM(s) Oral daily  aspirin enteric coated 81 milliGRAM(s) Oral daily  bisacodyl Suppository 10 milliGRAM(s) Rectal daily  calcium carbonate 1250 mG (OsCal) 1 Tablet(s) Oral two times a day  carvedilol 3.125 milliGRAM(s) Oral every 12 hours  dextrose 5% + sodium chloride 0.45%. 1000 milliLiter(s) (70 mL/Hr) IV Continuous <Continuous>  docusate sodium 300 milliGRAM(s) Oral daily  famotidine    Tablet 20 milliGRAM(s) Oral daily  heparin  Injectable 5000 Unit(s) SubCutaneous every 12 hours  lactulose Syrup 20 Gram(s) Oral daily  multivitamin 1 Tablet(s) Oral daily  piperacillin/tazobactam IVPB. 3.375 Gram(s) IV Intermittent every 12 hours  polyethylene glycol 3350 17 Gram(s) Oral daily  senna 2 Tablet(s) Oral two times a day    MEDICATIONS  (PRN):      Allergies    Allergy Status Unknown    Intolerances        LABS:                    CAPILLARY BLOOD GLUCOSE            RADIOLOGY & ADDITIONAL TESTS:    CXR:    Ct scan chest:    ekg;    echo:

## 2018-05-15 NOTE — DIETITIAN INITIAL EVALUATION ADULT. - FACTORS AFF FOOD INTAKE
difficulty with food procurement/preparation/pain/persistent constipation/residula rt. side weakness, diverticulitis, hydronephrosis, CVA, COPD/other (specify)

## 2018-05-15 NOTE — PHYSICAL THERAPY INITIAL EVALUATION ADULT - GENERAL OBSERVATIONS, REHAB EVAL
pt awake alert cooperative with PT assessment, pmh R hemiphlegia, tolerating bedside assessment with assistance

## 2018-05-15 NOTE — PHYSICAL THERAPY INITIAL EVALUATION ADULT - IMPAIRMENTS CONTRIBUTING IMPAIRED BED MOBILITY, REHAB EVAL
decreased strength/impaired balance/decreased flexibility/impaired motor control/decreased ROM/impaired postural control

## 2018-05-15 NOTE — DIETITIAN INITIAL EVALUATION ADULT. - NS AS NUTRI INTERV COLLABORAT
Rec. Speech & Swallow evaluation, as medically feasible/Collaboration with other nutrition professionals/Collaboration with other providers

## 2018-05-15 NOTE — PROGRESS NOTE ADULT - SUBJECTIVE AND OBJECTIVE BOX
Patient is a 89y old  Female who presents with a chief complaint of failure to thrive (14 May 2018 10:37)    pt seen in ed [  ], reg med floor [ x  ], bed [ x ], chair at bedside [   ], awake and responsive [ x ], lethargic [  ],  nad [ x ]        Allergies    Allergy Status Unknown        Vitals    T(F): 97.5 (05-15-18 @ 05:27), Max: 98.7 (05-14-18 @ 13:47)  HR: 85 (05-15-18 @ 05:27) (79 - 91)  BP: 150/82 (05-15-18 @ 05:27) (139/85 - 150/82)  RR: 16 (05-15-18 @ 05:27) (16 - 18)  SpO2: 95% (05-15-18 @ 05:27) (95% - 100%)  Wt(kg): --  CAPILLARY BLOOD GLUCOSE          Labs      .Stool Feces  05-13 @ 02:09   No enteric pathogens isolated.  (Stool culture examined for Salmonella,  Shigella, Campylobacter, Aeromonas, Plesiomonas,  Vibrio, E.coli O157 and Yersinia)  --  --      .Blood Blood-Peripheral  05-11 @ 19:45   No growth to date.  --  --          Radiology Results      Meds    MEDICATIONS  (STANDING):  ALBUTerol/ipratropium for Nebulization 3 milliLiter(s) Nebulizer every 6 hours  ascorbic acid 500 milliGRAM(s) Oral daily  aspirin enteric coated 81 milliGRAM(s) Oral daily  bisacodyl Suppository 10 milliGRAM(s) Rectal daily  calcium carbonate 1250 mG (OsCal) 1 Tablet(s) Oral two times a day  carvedilol 3.125 milliGRAM(s) Oral every 12 hours  dextrose 5% + sodium chloride 0.45%. 1000 milliLiter(s) (70 mL/Hr) IV Continuous <Continuous>  docusate sodium 300 milliGRAM(s) Oral daily  famotidine    Tablet 20 milliGRAM(s) Oral daily  heparin  Injectable 5000 Unit(s) SubCutaneous every 12 hours  lactulose Syrup 20 Gram(s) Oral daily  multivitamin 1 Tablet(s) Oral daily  piperacillin/tazobactam IVPB. 3.375 Gram(s) IV Intermittent every 12 hours  polyethylene glycol 3350 17 Gram(s) Oral daily  senna 2 Tablet(s) Oral two times a day      MEDICATIONS  (PRN):      Physical Exam    Neuro :  right side weakness  Respiratory: CTA B/L  CV: RRR, S1S2, no murmurs,   Abdominal: Soft, ND +BS, nontender to palp  Extremities: No edema, + peripheral pulses    ASSESSMENT    encephalopathy likely 2nd to infectious process  diverticulitis  cholelithiasis  b/l hydronephrosis  s/p Acute renal failure  s/p electrolyte imbalance  h/o CAD (coronary artery disease)  GERD (gastroesophageal reflux disease)  Dementia  CVA (cerebral vascular accident)  COPD (chronic obstructive pulmonary disease)  Heart failure, systolic  History of hypertension  No significant past surgical history      PLAN    ct head neg for acute ischemia noted above  ct abd pelv with sigmoid diverticulitis, hydronephrosis and cholelithiasis noted above  cont zosyn  Will change to PO Cipro and Flagyl upon discharge to finish total 14 days of ABX therapy.  id f/u   blood cx neg  surg cons noted  no surgical intervention indicated at this time  d/c patrick with tov  renal sono neg for hydronephrosis noted   urine creat wnl  pulm f/u  pft outpt  adv diet  as tolerated  cont current meds  d/c paln for return to [facility]

## 2018-05-15 NOTE — DIETITIAN INITIAL EVALUATION ADULT. - NUTRITION INTERVENTION
Meals and Snack/Medical Food Supplements/Collaboration and Referral of Nutrition Care/Vitamin/Feeding Assistance

## 2018-05-15 NOTE — PHYSICAL THERAPY INITIAL EVALUATION ADULT - LEVEL OF INDEPENDENCE: STAIR NEGOTIATION, REHAB EVAL
Unable to assess pt on the stairs at this time, pt does not exhibit appropriate pre requisite skills to safely negotiate unlevel surfaces due to decrease trunk musculature and unsteady balance which placed pt significant risks for falls and injury

## 2018-05-15 NOTE — DIETITIAN INITIAL EVALUATION ADULT. - ORAL INTAKE PTA
fair/NH diet order: Low cholesterol, low fat, mechanical soft/denture soft, nectar thick liquids, Boost, 8oz BID

## 2018-05-15 NOTE — DIETITIAN INITIAL EVALUATION ADULT. - PERTINENT LABORATORY DATA
05-12 Phos 2.7 mg/dL 05-08 Alb 2.3 g/dL<L> 05-09 [id number] 5.5 % 05-09 Chol 94 mg/dL LDL 44 mg/dL HDL 29 mg/dL<L> Trig 107 mg/dL

## 2018-05-15 NOTE — DIETITIAN INITIAL EVALUATION ADULT. - MD RECOMMEND
po supplement/Add Ensure Enlive 1 can BID (700 Kcal, Protein 40 grams) to Pureed diet, as medically feasible

## 2018-05-15 NOTE — PROGRESS NOTE ADULT - SUBJECTIVE AND OBJECTIVE BOX
Meds:  piperacillin/tazobactam IVPB. 3.375 Gram(s) IV Intermittent every 12 hours    Allergies:  Allergies    Allergy Status Unknown    Intolerances        ROS  [  ] UNABLE TO ELICIT {very poor historian}    General:  [  ] None  [  ] Fever  [  ] Chills  [ x ] Malaise    Skin:  [ x ] None [  ] Rash  [  ] Wound  [  ] Ulcer    HEENT:  [ x ] None  [  ] Sore Throat  [  ] Nasal congestion/ runny nose  [  ] Photophobia [  ] Neck pain      Chest:  [ x ] None   [  ] SOB  [  ] Cough  [  ] None    Cardiovascular:   [ x ] None  [  ] CP  [  ] Palpitation    Gastrointestinal:  [ x ] None  [  ] Abd pain   [  ] Nausea    [  ] Vomiting   [  ] Diarrhea	     Genitourinary:  [ x ] None [  ] Polyuria   [  ] Urgency  [  ] Frequency  [  ] Dysuria    [  ]  Hematuria       Musculoskeletal:  [  ] None [  ] Back Pain	[  ] Body aches  [  ] Joint pain  [ x ] Weakness    Neurological: [  ] None [  ]Dizziness  [  ]Visual Disturbance  [  ]Headaches   [ x ] Weakness        PHYSICAL EXAM:  Vital Signs Last 24 Hrs  T(C): 36.4 (15 May 2018 05:27), Max: 37.1 (14 May 2018 13:47)  T(F): 97.5 (15 May 2018 05:27), Max: 98.7 (14 May 2018 13:47)  HR: 85 (15 May 2018 05:27) (79 - 91)  BP: 150/82 (15 May 2018 05:27) (139/85 - 150/82)  BP(mean): --  RR: 16 (15 May 2018 05:27) (16 - 18)  SpO2: 95% (15 May 2018 05:27) (95% - 100%)    Constitutional:    HEENT: [ x ] Wnl  [  ] Pharyngeal congestion    Neck:  [ x ] Supple  [  ]Lymphadenopathy  [ x ] No JVD   [  ] JVD  [  ] Masses   [  ] WNL    CHEST/Respiratory:  [  ]Clear to auscultation  [ x ] Rales   [  ] Rhonchi   [  ] Wheezing     [  ] Chest Tenderness      Cardiovascular:  [ x ] Reg S1 S2   [  ] Irreg S1 S2   [ x ]No Murmur  [  ] +ve Murmurs  [  ]Systolic [  ]Diastolic      Abdomen:  [ x ] Soft  [ x ] No tendrerness  [  ] Tenderness  [  ] Organomegaly  [  ] ABD Distention  [  ] Rigidity                       [ x ] No Regidity                       [ x ] No Rebound Tenderness  [  ] No Guarding Rigidity  [  ] Rebound Tenderness[  ] Guarding Rigidity                          [ x ]  +ve Bowel Sounds  [  ] Decreased Bowel Sounds    [  ] Absent Bowel Sounds  [ x ] Us catheter in place                          Extremities: [ x ] No edema [  ] Edema  [  ] Clubbing   [  ] Cyanosis                         [ x ] No Tender Calf muscles  [  ] Tender Calf muscles                        [ x ] Palpable peripheral pulses [ x ] Contracted right hand    Neurological: [ x ] Awake  [ x ] Alert  [ x ] Oriented  x  2                           [  ] Confused  [  ] Drowsy  [  ] respond to painful stimuli  [  ] Unresponsive    Skin:  [ x ] Intact [  ] Redness [  ] Thrombophlebitis  [  ] Rashes  [  ] Dry  [  ] Ulcers    Ortho:  [  ] Joint Swelling  [  ] Joint erythema [  ] Joint tenderness                [  ] Increased temp. to touch  [ x ] DJD [  ] WNL          LABS/DIAGNOSTIC TESTS                        12.1   8.7   )-----------( 220      ( 12 May 2018 07:24 )             36.8   05-12    141  |  106  |  4<L>  ----------------------------<  149<H>  3.6   |  27  |  0.65    Ca    8.2<L>      12 May 2018 07:24  Phos  2.7     05-12  Mg     2.1     05-12                CULTURES:   Culture - Stool (05.13.18 @ 02:09)    Specimen Source: .Stool Feces    Culture Results:   No enteric pathogens to date: Final culture pending      Culture - Blood (05.11.18 @ 19:45)    Specimen Source: .Blood Blood-Peripheral    Culture Results:   No growth to date.    Culture - Blood (05.11.18 @ 19:45)    Specimen Source: .Blood Blood-Peripheral    Culture Results:   No growth to date.        RADIOLOGY:    EXAM:  XR ABDOMEN PORTABLE URGENT 1V                            PROCEDURE DATE:  05/10/2018          INTERPRETATION:  Portable supine abdominal study. 2 films submitted.   Patient has abdominal distention.    Some slight arterial calcifications arenoted. There is a to and fro   thoracolumbar curve with lower lumbar degeneration.    Abdominal gas pattern is unremarkable. No gross organomegaly is seen.    There are some slight infiltrate/scar changes at left base again seen.    IMPRESSION: As above.            EXAM:  CT ABDOMEN AND PELVIS                            PROCEDURE DATE:  05/08/2018          INTERPRETATION:  CLINICAL STATEMENT: abd pain, distension, r/o volvulus    TECHNIQUE: CT of the abdomen and pelvis was performed in the axial plane   utilizing thin slices without IV or oral contrast. Sagittal and coronal   reformatting was performed.    COMPARISON: None.    FINDINGS:    The lower chest demonstrates areas of linear scarring in the lingula and   right lower lobe.    The evaluation of theabdominal viscera and the bowel is limited without   contrast.    The liver is unremarkable. Cholelithiasis is seen.    The spleen, pancreas and adrenal glands are grossly unremarkable.    There is mild bilateral hydronephrosis or urinary calculus. There is a   small cyst in the lateral midpole of the right kidney. The bladder is   unremarkable    There is no bowel obstruction.  There is no intraperitoneal free air.    There is no free fluid.The appendix is seen. There is colonic   diverticulosis. There is stranding in the fat surrounding the sigmoid   colon in the lower abdomen in the midline suggestive of diverticulitis   without evidence of abscess or perforation    There is no abdominal or pelvic lymphadenopathy.  The pelvic structures   are grossly unremarkable.    The aorta is not aneurysmal. There is no significant retroperitoneal or   pelvic lymphadenopathy. Pelvic structures are remarkable for   calcifications in the uterus which may represent fibroids..    The bony structures demonstrate degenerative changes of the spine with   levoscoliosis of the lumbar spine.    IMPRESSION:  Sigmoid diverticulitis without abscess or perforation  Mild bilateral hydronephrosis may be due to distended bladder  Cholelithiasis        EXAM:  CT BRAIN                            PROCEDURE DATE:  05/08/2018          INTERPRETATION:  Head CT without IV contrast     Clinical Indication: Altered mental status    Technique: Axial CT images of the head are obtained from the skull base   to the vertex without IV contrast.    Comparison: 10/27/2017.    Findings: There is no acute intracranial hemorrhage. No CT evidence for   an acute territorial infarct. Stable patchy hypodensities in the   periventricular and subcortical white matterbilaterally, compatible with   chronic small vessel ischemic disease. Stable encephalomalacia in the   right cerebellum, compatible with an old infarct. There is no   space-occupying lesion, midline shift, or herniation. The ventricles   maintain normal size, shape, and location. No extra-axial fluid   collection.  The visualized paranasal sinuses and orbits appear grossly   unremarkable.    Impression: No acute intracranial hemorrhage. No CT evidence for an acute   territorial infarct.    Stable chronic small vessel ischemic disease and old infarct.    If clinically indicated, brain MR may be pursued for further evaluation.        Assessment and Recommendation:   89 y/o F pt with PMHx of systolic failure, HTN, COPD, and CVA BIB EMS from nursing home ([facility]) for AMS x 1 week and cough x 2 weeks. Per family, pt has been acting confused, lethargic, and has not been eating. Pt denies chest pain, difficulty breathing, abd pain, vomiting or any other complaints.  CT of abdomen showed Sigmoid diverticulitis without abscess or perforation, Mild bilateral hydronephrosis may be due to distended bladder, and Cholelithiasis  5/9/18 creatinine became normal.    Problem/Recommendation - 1:  Problem: Diverticulitis large intestine w/o perforation or abscess w/bleeding.   Recommendation:   1- Urine CS.  2- Blood culture, and Follow to final report (are still -ve).  3- Continue IV Zosyn, but increase dose to 3.375 gm IVPB q hours as renal functions improved.  4- Fluid and electrolytes management.  5- CBC and BMP follow up.   6- Change to PO Cipro and Flagyl upon discharge to finish total 14 days of ABX therapy.    Problem/Recommendation - 2:  ·  Problem: Acute renal failure, unspecified acute renal failure type(improved).    Recommendation:   1- As per problem # 1.  2- Urology management and follow up.     Problem/Recommendation - 3:  ·  Problem: Hydronephrosis, unspecified hydronephrosis type.    Recommendation:   1- As per problem # 1 & # 2.     Problem/Recommendation - 4:  ·  Problem: COPD (chronic obstructive pulmonary disease).    Recommendation:   1- Bronchodilators.  2- O2 as needed.  3- Pulmonary management, and follow up.  4- Steroids as per primary, and pulmonary team if needed.     Problem/Recommendation - 5:  ·  Problem: HTN (hypertension).    Recommendation:   1- Monitor Blood pressure closely.  2- Blood pressure control.  3- BP. meds as per cardiology and primary care team.     Discussed with medical resident.

## 2018-05-15 NOTE — DIETITIAN INITIAL EVALUATION ADULT. - PROBLEM SELECTOR PLAN 4
likely due to insufficient PO intake  -will rehydrate volume depleted patient with D5+1/2NS for now gently at 60cc/hr  -monitor bmp q12 h for now

## 2018-05-15 NOTE — DIETITIAN INITIAL EVALUATION ADULT. - OTHER INFO
Nutrition consult requested for low Abdulaziz score. Patient from [facility]. Visited pt. alert but confused, per PCA pt. appetite improving, consuming 50% of meals, also had loose BM yesterday & in am today, likely to medication (IV antibiotics) receiving & followed by ID & surgery, encourage po intake with feeding assistance. Discussed with MD/RN.

## 2018-05-15 NOTE — PHYSICAL THERAPY INITIAL EVALUATION ADULT - ACTIVE RANGE OF MOTION EXAMINATION, REHAB EVAL
Left LE Active ROM was WFL (within functional limits)/Right LE Active ROM was WFL (within functional limits)/Left UE Active ROM was WFL (within functional limits)

## 2018-05-15 NOTE — PHYSICAL THERAPY INITIAL EVALUATION ADULT - IMPAIRMENTS FOUND, PT EVAL
decreased midline orientation/fine motor/gait, locomotion, and balance/gross motor/ROM/muscle strength

## 2018-05-16 VITALS
HEART RATE: 83 BPM | OXYGEN SATURATION: 95 % | DIASTOLIC BLOOD PRESSURE: 66 MMHG | TEMPERATURE: 99 F | SYSTOLIC BLOOD PRESSURE: 143 MMHG | RESPIRATION RATE: 18 BRPM

## 2018-05-16 PROCEDURE — 82728 ASSAY OF FERRITIN: CPT

## 2018-05-16 PROCEDURE — 82607 VITAMIN B-12: CPT

## 2018-05-16 PROCEDURE — 97161 PT EVAL LOW COMPLEX 20 MIN: CPT

## 2018-05-16 PROCEDURE — 83550 IRON BINDING TEST: CPT

## 2018-05-16 PROCEDURE — 87045 FECES CULTURE AEROBIC BACT: CPT

## 2018-05-16 PROCEDURE — 81001 URINALYSIS AUTO W/SCOPE: CPT

## 2018-05-16 PROCEDURE — 84443 ASSAY THYROID STIM HORMONE: CPT

## 2018-05-16 PROCEDURE — 87040 BLOOD CULTURE FOR BACTERIA: CPT

## 2018-05-16 PROCEDURE — 83935 ASSAY OF URINE OSMOLALITY: CPT

## 2018-05-16 PROCEDURE — 83036 HEMOGLOBIN GLYCOSYLATED A1C: CPT

## 2018-05-16 PROCEDURE — 84300 ASSAY OF URINE SODIUM: CPT

## 2018-05-16 PROCEDURE — 99285 EMERGENCY DEPT VISIT HI MDM: CPT | Mod: 25

## 2018-05-16 PROCEDURE — 76775 US EXAM ABDO BACK WALL LIM: CPT

## 2018-05-16 PROCEDURE — 80048 BASIC METABOLIC PNL TOTAL CA: CPT

## 2018-05-16 PROCEDURE — 83735 ASSAY OF MAGNESIUM: CPT

## 2018-05-16 PROCEDURE — 80061 LIPID PANEL: CPT

## 2018-05-16 PROCEDURE — 87046 STOOL CULTR AEROBIC BACT EA: CPT

## 2018-05-16 PROCEDURE — 74176 CT ABD & PELVIS W/O CONTRAST: CPT

## 2018-05-16 PROCEDURE — 85045 AUTOMATED RETICULOCYTE COUNT: CPT

## 2018-05-16 PROCEDURE — 93005 ELECTROCARDIOGRAM TRACING: CPT

## 2018-05-16 PROCEDURE — 82962 GLUCOSE BLOOD TEST: CPT

## 2018-05-16 PROCEDURE — 85027 COMPLETE CBC AUTOMATED: CPT

## 2018-05-16 PROCEDURE — 70450 CT HEAD/BRAIN W/O DYE: CPT

## 2018-05-16 PROCEDURE — 80053 COMPREHEN METABOLIC PANEL: CPT

## 2018-05-16 PROCEDURE — 94640 AIRWAY INHALATION TREATMENT: CPT

## 2018-05-16 PROCEDURE — 84100 ASSAY OF PHOSPHORUS: CPT

## 2018-05-16 PROCEDURE — 82570 ASSAY OF URINE CREATININE: CPT

## 2018-05-16 PROCEDURE — 82746 ASSAY OF FOLIC ACID SERUM: CPT

## 2018-05-16 PROCEDURE — 74018 RADEX ABDOMEN 1 VIEW: CPT

## 2018-05-16 RX ADMIN — Medication 10 MILLIGRAM(S): at 12:28

## 2018-05-16 RX ADMIN — Medication 3 MILLILITER(S): at 08:44

## 2018-05-16 RX ADMIN — FAMOTIDINE 20 MILLIGRAM(S): 10 INJECTION INTRAVENOUS at 12:27

## 2018-05-16 RX ADMIN — POLYETHYLENE GLYCOL 3350 17 GRAM(S): 17 POWDER, FOR SOLUTION ORAL at 12:27

## 2018-05-16 RX ADMIN — Medication 3 MILLILITER(S): at 15:00

## 2018-05-16 RX ADMIN — Medication 81 MILLIGRAM(S): at 12:27

## 2018-05-16 RX ADMIN — PIPERACILLIN AND TAZOBACTAM 25 GRAM(S): 4; .5 INJECTION, POWDER, LYOPHILIZED, FOR SOLUTION INTRAVENOUS at 05:54

## 2018-05-16 RX ADMIN — SENNA PLUS 2 TABLET(S): 8.6 TABLET ORAL at 05:54

## 2018-05-16 RX ADMIN — Medication 1 TABLET(S): at 05:53

## 2018-05-16 RX ADMIN — HEPARIN SODIUM 5000 UNIT(S): 5000 INJECTION INTRAVENOUS; SUBCUTANEOUS at 05:54

## 2018-05-16 RX ADMIN — Medication 1 TABLET(S): at 12:27

## 2018-05-16 RX ADMIN — Medication 500 MILLIGRAM(S): at 12:27

## 2018-05-16 RX ADMIN — CARVEDILOL PHOSPHATE 3.12 MILLIGRAM(S): 80 CAPSULE, EXTENDED RELEASE ORAL at 05:53

## 2018-05-16 RX ADMIN — LACTULOSE 20 GRAM(S): 10 SOLUTION ORAL at 12:27

## 2018-05-16 RX ADMIN — Medication 300 MILLIGRAM(S): at 12:28

## 2018-05-16 NOTE — PROGRESS NOTE ADULT - SUBJECTIVE AND OBJECTIVE BOX
Patient is a 89y old  Female who presents with a chief complaint of failure to thrive (14 May 2018 10:37)    pt seen in ed [  ], reg med floor [ x  ], bed [ x ], chair at bedside [   ], awake and responsive [ x ], lethargic [  ],  nad [ x ]      Allergies    Allergy Status Unknown        Vitals    T(F): 98 (05-16-18 @ 05:06), Max: 99.3 (05-15-18 @ 20:44)  HR: 84 (05-16-18 @ 05:06) (84 - 101)  BP: 145/81 (05-16-18 @ 05:06) (124/79 - 149/86)  RR: 18 (05-16-18 @ 05:06) (18 - 18)  SpO2: 94% (05-16-18 @ 05:06) (93% - 94%)  Wt(kg): --  CAPILLARY BLOOD GLUCOSE          Labs          .Stool Feces  05-13 @ 02:09   No enteric pathogens isolated.  (Stool culture examined for Salmonella,  Shigella, Campylobacter, Aeromonas, Plesiomonas,  Vibrio, E.coli O157 and Yersinia)  --  --      .Blood Blood-Peripheral  05-11 @ 19:45   No growth to date.  --  --          Radiology Results      Meds    MEDICATIONS  (STANDING):  ALBUTerol/ipratropium for Nebulization 3 milliLiter(s) Nebulizer every 6 hours  ascorbic acid 500 milliGRAM(s) Oral daily  aspirin enteric coated 81 milliGRAM(s) Oral daily  bisacodyl Suppository 10 milliGRAM(s) Rectal daily  calcium carbonate 1250 mG (OsCal) 1 Tablet(s) Oral two times a day  carvedilol 3.125 milliGRAM(s) Oral every 12 hours  dextrose 5% + sodium chloride 0.45%. 1000 milliLiter(s) (70 mL/Hr) IV Continuous <Continuous>  docusate sodium 300 milliGRAM(s) Oral daily  famotidine    Tablet 20 milliGRAM(s) Oral daily  heparin  Injectable 5000 Unit(s) SubCutaneous every 12 hours  lactulose Syrup 20 Gram(s) Oral daily  multivitamin 1 Tablet(s) Oral daily  piperacillin/tazobactam IVPB. 3.375 Gram(s) IV Intermittent every 12 hours  polyethylene glycol 3350 17 Gram(s) Oral daily  senna 2 Tablet(s) Oral two times a day      MEDICATIONS  (PRN):      Physical Exam      Neuro :  right side weakness  Respiratory: CTA B/L  CV: RRR, S1S2, no murmurs,   Abdominal: Soft, ND +BS, nontender to palp  Extremities: No edema, + peripheral pulses    ASSESSMENT    encephalopathy likely 2nd to infectious process  diverticulitis  cholelithiasis  b/l hydronephrosis  s/p Acute renal failure  s/p electrolyte imbalance  h/o CAD (coronary artery disease)  GERD (gastroesophageal reflux disease)  Dementia  CVA (cerebral vascular accident)  COPD (chronic obstructive pulmonary disease)  Heart failure, systolic  History of hypertension  No significant past surgical history      PLAN    ct head neg for acute ischemia noted above  ct abd pelv with sigmoid diverticulitis, hydronephrosis and cholelithiasis noted above  cont zosyn  Will change to PO Cipro and Flagyl upon discharge to finish total 14 days of ABX therapy on 5/21/18.  id f/u noted   blood cx neg  surg cons noted  no surgical intervention indicated at this time  d/c patrick with tov  renal sono neg for hydronephrosis noted   urine creat wnl  pulm f/u  pft outpt  adv diet  as tolerated  cont current meds  d/c paln for return to [facility] pending ins auth

## 2018-05-16 NOTE — PROGRESS NOTE ADULT - SUBJECTIVE AND OBJECTIVE BOX
Meds:  piperacillin/tazobactam IVPB. 3.375 Gram(s) IV Intermittent every 12 hours    Allergies:  Allergies    Allergy Status Unknown    Intolerances        ROS  [  ] UNABLE TO ELICIT {very poor historian}    General:  [  ] None  [  ] Fever  [  ] Chills  [ x ] Malaise    Skin:  [ x ] None [  ] Rash  [  ] Wound  [  ] Ulcer    HEENT:  [ x ] None  [  ] Sore Throat  [  ] Nasal congestion/ runny nose  [  ] Photophobia [  ] Neck pain      Chest:  [ x ] None   [  ] SOB  [  ] Cough  [  ] None    Cardiovascular:   [ x ] None  [  ] CP  [  ] Palpitation    Gastrointestinal:  [ x ] None  [  ] Abd pain   [  ] Nausea    [  ] Vomiting   [  ] Diarrhea	     Genitourinary:  [ x ] None [  ] Polyuria   [  ] Urgency  [  ] Frequency  [  ] Dysuria    [  ]  Hematuria       Musculoskeletal:  [  ] None [  ] Back Pain	[  ] Body aches  [  ] Joint pain  [ x ] Weakness    Neurological: [  ] None [  ]Dizziness  [  ]Visual Disturbance  [  ]Headaches   [ x ] Weakness        PHYSICAL EXAM:  Vital Signs Last 24 Hrs  T(C): 37.4 (15 May 2018 20:44), Max: 37.4 (15 May 2018 20:44)  T(F): 99.3 (15 May 2018 20:44), Max: 99.3 (15 May 2018 20:44)  HR: 87 (15 May 2018 20:44) (85 - 101)  BP: 142/82 (15 May 2018 20:44) (124/79 - 150/82)  BP(mean): --  RR: 18 (15 May 2018 20:44) (16 - 18)  SpO2: 94% (15 May 2018 20:44) (93% - 95%)    Constitutional:    HEENT: [ x ] Wnl  [  ] Pharyngeal congestion    Neck:  [ x ] Supple  [  ]Lymphadenopathy  [ x ] No JVD   [  ] JVD  [  ] Masses   [  ] WNL    CHEST/Respiratory:  [  ]Clear to auscultation  [ x ] Rales   [  ] Rhonchi   [  ] Wheezing     [  ] Chest Tenderness      Cardiovascular:  [ x ] Reg S1 S2   [  ] Irreg S1 S2   [ x ]No Murmur  [  ] +ve Murmurs  [  ]Systolic [  ]Diastolic      Abdomen:  [ x ] Soft  [ x ] No tendrerness  [  ] Tenderness  [  ] Organomegaly  [  ] ABD Distention  [  ] Rigidity                       [ x ] No Regidity                       [ x ] No Rebound Tenderness  [  ] No Guarding Rigidity  [  ] Rebound Tenderness[  ] Guarding Rigidity                          [ x ]  +ve Bowel Sounds  [  ] Decreased Bowel Sounds    [  ] Absent Bowel Sounds  [ x ] Us catheter in place                          Extremities: [ x ] No edema [  ] Edema  [  ] Clubbing   [  ] Cyanosis                         [ x ] No Tender Calf muscles  [  ] Tender Calf muscles                        [ x ] Palpable peripheral pulses [ x ] Contracted right hand    Neurological: [ x ] Awake  [ x ] Alert  [ x ] Oriented  x  2                           [  ] Confused  [  ] Drowsy  [  ] respond to painful stimuli  [  ] Unresponsive    Skin:  [ x ] Intact [  ] Redness [  ] Thrombophlebitis  [  ] Rashes  [  ] Dry  [  ] Ulcers    Ortho:  [  ] Joint Swelling  [  ] Joint erythema [  ] Joint tenderness                [  ] Increased temp. to touch  [ x ] DJD [  ] WNL          LABS/DIAGNOSTIC TESTS                        12.1   8.7   )-----------( 220      ( 12 May 2018 07:24 )             36.8   05-12    141  |  106  |  4<L>  ----------------------------<  149<H>  3.6   |  27  |  0.65    Ca    8.2<L>      12 May 2018 07:24  Phos  2.7     05-12  Mg     2.1     05-12                CULTURES:   Culture - Stool (05.13.18 @ 02:09)    Specimen Source: .Stool Feces    Culture Results:   No enteric pathogens to date: Final culture pending      Culture - Blood (05.11.18 @ 19:45)    Specimen Source: .Blood Blood-Peripheral    Culture Results:   No growth to date.    Culture - Blood (05.11.18 @ 19:45)    Specimen Source: .Blood Blood-Peripheral    Culture Results:   No growth to date.        RADIOLOGY:    EXAM:  XR ABDOMEN PORTABLE URGENT 1V                            PROCEDURE DATE:  05/10/2018          INTERPRETATION:  Portable supine abdominal study. 2 films submitted.   Patient has abdominal distention.    Some slight arterial calcifications arenoted. There is a to and fro   thoracolumbar curve with lower lumbar degeneration.    Abdominal gas pattern is unremarkable. No gross organomegaly is seen.    There are some slight infiltrate/scar changes at left base again seen.    IMPRESSION: As above.            EXAM:  CT ABDOMEN AND PELVIS                            PROCEDURE DATE:  05/08/2018          INTERPRETATION:  CLINICAL STATEMENT: abd pain, distension, r/o volvulus    TECHNIQUE: CT of the abdomen and pelvis was performed in the axial plane   utilizing thin slices without IV or oral contrast. Sagittal and coronal   reformatting was performed.    COMPARISON: None.    FINDINGS:    The lower chest demonstrates areas of linear scarring in the lingula and   right lower lobe.    The evaluation of the abdominal viscera and the bowel is limited without   contrast.    The liver is unremarkable. Cholelithiasis is seen.    The spleen, pancreas and adrenal glands are grossly unremarkable.    There is mild bilateral hydronephrosis or urinary calculus. There is a   small cyst in the lateral midpole of the right kidney. The bladder is   unremarkable    There is no bowel obstruction.  There is no intraperitoneal free air.    There is no free fluid. The appendix is seen. There is colonic   diverticulosis. There is stranding in the fat surrounding the sigmoid   colon in the lower abdomen in the midline suggestive of diverticulitis   without evidence of abscess or perforation    There is no abdominal or pelvic lymphadenopathy.  The pelvic structures   are grossly unremarkable.    The aorta is not aneurysmal. There is no significant retroperitoneal or   pelvic lymphadenopathy. Pelvic structures are remarkable for   calcifications in the uterus which may represent fibroids..    The bony structures demonstrate degenerative changes of the spine with   levoscoliosis of the lumbar spine.    IMPRESSION:  Sigmoid diverticulitis without abscess or perforation  Mild bilateral hydronephrosis may be due to distended bladder  Cholelithiasis        EXAM:  CT BRAIN                            PROCEDURE DATE:  05/08/2018          INTERPRETATION:  Head CT without IV contrast     Clinical Indication: Altered mental status    Technique: Axial CT images of the head are obtained from the skull base   to the vertex without IV contrast.    Comparison: 10/27/2017.    Findings: There is no acute intracranial hemorrhage. No CT evidence for   an acute territorial infarct. Stable patchy hypodensities in the   periventricular and subcortical white matterbilaterally, compatible with   chronic small vessel ischemic disease. Stable encephalomalacia in the   right cerebellum, compatible with an old infarct. There is no   space-occupying lesion, midline shift, or herniation. The ventricles   maintain normal size, shape, and location. No extra-axial fluid   collection.  The visualized paranasal sinuses and orbits appear grossly   unremarkable.    Impression: No acute intracranial hemorrhage. No CT evidence for an acute   territorial infarct.    Stable chronic small vessel ischemic disease and old infarct.    If clinically indicated, brain MR may be pursued for further evaluation.        Assessment and Recommendation:   89 y/o F pt with PMHx of systolic failure, HTN, COPD, and CVA BIB EMS from nursing home ([facility]) for AMS x 1 week and cough x 2 weeks. Per family, pt has been acting confused, lethargic, and has not been eating. Pt denies chest pain, difficulty breathing, abd pain, vomiting or any other complaints.  CT of abdomen showed Sigmoid diverticulitis without abscess or perforation, Mild bilateral hydronephrosis may be due to distended bladder, and Cholelithiasis  5/9/18 creatinine became normal.    Problem/Recommendation - 1:  Problem: Diverticulitis large intestine w/o perforation or abscess w/bleeding.   Recommendation:   1- Urine CS for fever or leukocytosis.  2- Blood culture, and Follow to final report (are still -ve).  3- Continue IV Zosyn, but increase dose to 3.375 gm IVPB q hours as renal functions improved.  4- Fluid and electrolytes management.  5- CBC and BMP follow up.   6- Change to PO Cipro and Flagyl upon discharge to finish total 14 days of ABX therapy (till 5/21/18).    Problem/Recommendation - 2:  ·  Problem: Acute renal failure, unspecified acute renal failure type(improved).    Recommendation:   1- As per problem # 1.  2- Urology management and follow up.     Problem/Recommendation - 3:  ·  Problem: Hydronephrosis, unspecified hydronephrosis type.    Recommendation:   1- As per problem # 1 & # 2.     Problem/Recommendation - 4:  ·  Problem: COPD (chronic obstructive pulmonary disease).    Recommendation:   1- Bronchodilators.  2- O2 as needed.  3- Pulmonary management, and follow up.  4- Steroids as per primary, and pulmonary team if needed.     Problem/Recommendation - 5:  ·  Problem: HTN (hypertension).    Recommendation:   1- Monitor Blood pressure closely.  2- Blood pressure control.  3- BP. meds as per cardiology and primary care team.     Discussed with medical resident.

## 2018-05-16 NOTE — PROGRESS NOTE ADULT - PROVIDER SPECIALTY LIST ADULT
Infectious Disease
Internal Medicine
Pulmonology
Internal Medicine

## 2018-05-16 NOTE — PROGRESS NOTE ADULT - SUBJECTIVE AND OBJECTIVE BOX
Patient is a 89y old  Female who presents with a chief complaint of failure to thrive (14 May 2018 10:37)  Awake, alert, comfortable in bed in NAD.    INTERVAL HPI/OVERNIGHT EVENTS:      VITAL SIGNS:  T(F): 98 (05-16-18 @ 05:06)  HR: 84 (05-16-18 @ 05:06)  BP: 145/81 (05-16-18 @ 05:06)  RR: 18 (05-16-18 @ 05:06)  SpO2: 94% (05-16-18 @ 05:06)  Wt(kg): --  I&O's Detail          REVIEW OF SYSTEMS:    CONSTITUTIONAL:  No fevers, chills, sweats    HEENT:  Eyes:  No diplopia or blurred vision. ENT:  No earache, sore throat or runny nose.    CARDIOVASCULAR:  No pressure, squeezing, tightness, or heaviness about the chest; no palpitations.    RESPIRATORY:  Per HPI    GASTROINTESTINAL:  No abdominal pain, nausea, vomiting or diarrhea.    GENITOURINARY:  No dysuria, frequency or urgency.    NEUROLOGIC:  No paresthesias, fasciculations, seizures or weakness.    PSYCHIATRIC:  No disorder of thought or mood.      PHYSICAL EXAM:    Constitutional: Well developed and nourished  Eyes:Perrla  ENMT: normal  Neck:supple  Respiratory: good air entry  Cardiovascular: S1 S2 regular  Gastrointestinal: Soft, Non tender  Extremities: No edema  Vascular:normal  Neurological:Awake, alert  Musculoskeletal:Normal      MEDICATIONS  (STANDING):  ALBUTerol/ipratropium for Nebulization 3 milliLiter(s) Nebulizer every 6 hours  ascorbic acid 500 milliGRAM(s) Oral daily  aspirin enteric coated 81 milliGRAM(s) Oral daily  bisacodyl Suppository 10 milliGRAM(s) Rectal daily  calcium carbonate 1250 mG (OsCal) 1 Tablet(s) Oral two times a day  carvedilol 3.125 milliGRAM(s) Oral every 12 hours  dextrose 5% + sodium chloride 0.45%. 1000 milliLiter(s) (70 mL/Hr) IV Continuous <Continuous>  docusate sodium 300 milliGRAM(s) Oral daily  famotidine    Tablet 20 milliGRAM(s) Oral daily  heparin  Injectable 5000 Unit(s) SubCutaneous every 12 hours  lactulose Syrup 20 Gram(s) Oral daily  multivitamin 1 Tablet(s) Oral daily  piperacillin/tazobactam IVPB. 3.375 Gram(s) IV Intermittent every 12 hours  polyethylene glycol 3350 17 Gram(s) Oral daily  senna 2 Tablet(s) Oral two times a day    MEDICATIONS  (PRN):      Allergies    Allergy Status Unknown    Intolerances        LABS:                    CAPILLARY BLOOD GLUCOSE            RADIOLOGY & ADDITIONAL TESTS:    CXR:    Ct scan chest:    ekg;    echo:

## 2018-10-30 ENCOUNTER — INPATIENT (INPATIENT)
Facility: HOSPITAL | Age: 83
LOS: 2 days | Discharge: OTHER INSTITUTION | DRG: 64 | End: 2018-11-02
Attending: INTERNAL MEDICINE | Admitting: INTERNAL MEDICINE
Payer: MEDICARE

## 2018-10-30 VITALS
TEMPERATURE: 98 F | OXYGEN SATURATION: 99 % | HEART RATE: 81 BPM | SYSTOLIC BLOOD PRESSURE: 144 MMHG | RESPIRATION RATE: 13 BRPM | DIASTOLIC BLOOD PRESSURE: 100 MMHG

## 2018-10-30 DIAGNOSIS — Z29.9 ENCOUNTER FOR PROPHYLACTIC MEASURES, UNSPECIFIED: ICD-10-CM

## 2018-10-30 DIAGNOSIS — I63.9 CEREBRAL INFARCTION, UNSPECIFIED: ICD-10-CM

## 2018-10-30 DIAGNOSIS — J44.9 CHRONIC OBSTRUCTIVE PULMONARY DISEASE, UNSPECIFIED: ICD-10-CM

## 2018-10-30 DIAGNOSIS — I10 ESSENTIAL (PRIMARY) HYPERTENSION: ICD-10-CM

## 2018-10-30 LAB
ALBUMIN SERPL ELPH-MCNC: 2.6 G/DL — LOW (ref 3.5–5)
ALP SERPL-CCNC: 57 U/L — SIGNIFICANT CHANGE UP (ref 40–120)
ALT FLD-CCNC: 15 U/L DA — SIGNIFICANT CHANGE UP (ref 10–60)
AMMONIA BLD-MCNC: <10 UMOL/L — LOW (ref 11–32)
ANION GAP SERPL CALC-SCNC: 4 MMOL/L — LOW (ref 5–17)
APPEARANCE UR: CLEAR — SIGNIFICANT CHANGE UP
AST SERPL-CCNC: 16 U/L — SIGNIFICANT CHANGE UP (ref 10–40)
BASE EXCESS BLDV CALC-SCNC: 4.7 MMOL/L — HIGH (ref -2–2)
BASOPHILS # BLD AUTO: 0.1 K/UL — SIGNIFICANT CHANGE UP (ref 0–0.2)
BASOPHILS NFR BLD AUTO: 0.9 % — SIGNIFICANT CHANGE UP (ref 0–2)
BILIRUB SERPL-MCNC: 0.4 MG/DL — SIGNIFICANT CHANGE UP (ref 0.2–1.2)
BILIRUB UR-MCNC: NEGATIVE — SIGNIFICANT CHANGE UP
BUN SERPL-MCNC: 16 MG/DL — SIGNIFICANT CHANGE UP (ref 7–18)
CALCIUM SERPL-MCNC: 8.8 MG/DL — SIGNIFICANT CHANGE UP (ref 8.4–10.5)
CHLORIDE SERPL-SCNC: 105 MMOL/L — SIGNIFICANT CHANGE UP (ref 96–108)
CO2 SERPL-SCNC: 31 MMOL/L — SIGNIFICANT CHANGE UP (ref 22–31)
COLOR SPEC: YELLOW — SIGNIFICANT CHANGE UP
CREAT SERPL-MCNC: 0.6 MG/DL — SIGNIFICANT CHANGE UP (ref 0.5–1.3)
DIFF PNL FLD: ABNORMAL
EOSINOPHIL # BLD AUTO: 0.6 K/UL — HIGH (ref 0–0.5)
EOSINOPHIL NFR BLD AUTO: 7.8 % — HIGH (ref 0–6)
GLUCOSE SERPL-MCNC: 93 MG/DL — SIGNIFICANT CHANGE UP (ref 70–99)
GLUCOSE UR QL: NEGATIVE — SIGNIFICANT CHANGE UP
HCO3 BLDV-SCNC: 31 MMOL/L — HIGH (ref 21–29)
HCT VFR BLD CALC: 30.3 % — LOW (ref 34.5–45)
HGB BLD-MCNC: 10 G/DL — LOW (ref 11.5–15.5)
HOROWITZ INDEX BLDV+IHG-RTO: 21 — SIGNIFICANT CHANGE UP
KETONES UR-MCNC: NEGATIVE — SIGNIFICANT CHANGE UP
LACTATE SERPL-SCNC: 1.4 MMOL/L — SIGNIFICANT CHANGE UP (ref 0.7–2)
LEUKOCYTE ESTERASE UR-ACNC: ABNORMAL
LIDOCAIN IGE QN: 56 U/L — LOW (ref 73–393)
LYMPHOCYTES # BLD AUTO: 2.2 K/UL — SIGNIFICANT CHANGE UP (ref 1–3.3)
LYMPHOCYTES # BLD AUTO: 28.1 % — SIGNIFICANT CHANGE UP (ref 13–44)
MCHC RBC-ENTMCNC: 28.1 PG — SIGNIFICANT CHANGE UP (ref 27–34)
MCHC RBC-ENTMCNC: 33 GM/DL — SIGNIFICANT CHANGE UP (ref 32–36)
MCV RBC AUTO: 85.2 FL — SIGNIFICANT CHANGE UP (ref 80–100)
MONOCYTES # BLD AUTO: 0.6 K/UL — SIGNIFICANT CHANGE UP (ref 0–0.9)
MONOCYTES NFR BLD AUTO: 8.4 % — SIGNIFICANT CHANGE UP (ref 2–14)
NEUTROPHILS # BLD AUTO: 4.2 K/UL — SIGNIFICANT CHANGE UP (ref 1.8–7.4)
NEUTROPHILS NFR BLD AUTO: 54.8 % — SIGNIFICANT CHANGE UP (ref 43–77)
NITRITE UR-MCNC: NEGATIVE — SIGNIFICANT CHANGE UP
PCO2 BLDV: 53 MMHG — HIGH (ref 35–50)
PH BLDV: 7.38 — SIGNIFICANT CHANGE UP (ref 7.35–7.45)
PH UR: 8 — SIGNIFICANT CHANGE UP (ref 5–8)
PLATELET # BLD AUTO: 169 K/UL — SIGNIFICANT CHANGE UP (ref 150–400)
PO2 BLDV: <47 MMHG — HIGH (ref 25–45)
POTASSIUM SERPL-MCNC: 4.2 MMOL/L — SIGNIFICANT CHANGE UP (ref 3.5–5.3)
POTASSIUM SERPL-SCNC: 4.2 MMOL/L — SIGNIFICANT CHANGE UP (ref 3.5–5.3)
PROT SERPL-MCNC: 7.1 G/DL — SIGNIFICANT CHANGE UP (ref 6–8.3)
PROT UR-MCNC: 30 MG/DL
RBC # BLD: 3.55 M/UL — LOW (ref 3.8–5.2)
RBC # FLD: 14.1 % — SIGNIFICANT CHANGE UP (ref 10.3–14.5)
SAO2 % BLDV: 58 % — LOW (ref 67–88)
SODIUM SERPL-SCNC: 140 MMOL/L — SIGNIFICANT CHANGE UP (ref 135–145)
SP GR SPEC: 1.01 — SIGNIFICANT CHANGE UP (ref 1.01–1.02)
TROPONIN I SERPL-MCNC: 0.01 NG/ML — SIGNIFICANT CHANGE UP (ref 0–0.04)
TROPONIN I SERPL-MCNC: 0.02 NG/ML — SIGNIFICANT CHANGE UP (ref 0–0.04)
UROBILINOGEN FLD QL: NEGATIVE — SIGNIFICANT CHANGE UP
WBC # BLD: 7.7 K/UL — SIGNIFICANT CHANGE UP (ref 3.8–10.5)
WBC # FLD AUTO: 7.7 K/UL — SIGNIFICANT CHANGE UP (ref 3.8–10.5)

## 2018-10-30 PROCEDURE — 71045 X-RAY EXAM CHEST 1 VIEW: CPT | Mod: 26

## 2018-10-30 PROCEDURE — 70498 CT ANGIOGRAPHY NECK: CPT | Mod: 26

## 2018-10-30 PROCEDURE — 99255 IP/OBS CONSLTJ NEW/EST HI 80: CPT

## 2018-10-30 PROCEDURE — 70496 CT ANGIOGRAPHY HEAD: CPT | Mod: 26

## 2018-10-30 PROCEDURE — 99291 CRITICAL CARE FIRST HOUR: CPT

## 2018-10-30 RX ORDER — FAMOTIDINE 10 MG/ML
20 INJECTION INTRAVENOUS EVERY 24 HOURS
Qty: 0 | Refills: 0 | Status: DISCONTINUED | OUTPATIENT
Start: 2018-10-30 | End: 2018-11-02

## 2018-10-30 RX ORDER — ASPIRIN/CALCIUM CARB/MAGNESIUM 324 MG
81 TABLET ORAL DAILY
Qty: 0 | Refills: 0 | Status: DISCONTINUED | OUTPATIENT
Start: 2018-10-30 | End: 2018-11-02

## 2018-10-30 RX ORDER — DOCUSATE SODIUM 100 MG
100 CAPSULE ORAL THREE TIMES A DAY
Qty: 0 | Refills: 0 | Status: DISCONTINUED | OUTPATIENT
Start: 2018-10-30 | End: 2018-11-02

## 2018-10-30 RX ORDER — HEPARIN SODIUM 5000 [USP'U]/ML
5000 INJECTION INTRAVENOUS; SUBCUTANEOUS EVERY 8 HOURS
Qty: 0 | Refills: 0 | Status: DISCONTINUED | OUTPATIENT
Start: 2018-10-30 | End: 2018-11-02

## 2018-10-30 RX ORDER — SENNA PLUS 8.6 MG/1
2 TABLET ORAL AT BEDTIME
Qty: 0 | Refills: 0 | Status: DISCONTINUED | OUTPATIENT
Start: 2018-10-30 | End: 2018-11-02

## 2018-10-30 RX ORDER — LISINOPRIL 2.5 MG/1
5 TABLET ORAL DAILY
Qty: 0 | Refills: 0 | Status: DISCONTINUED | OUTPATIENT
Start: 2018-10-30 | End: 2018-11-02

## 2018-10-30 RX ORDER — ATORVASTATIN CALCIUM 80 MG/1
40 TABLET, FILM COATED ORAL AT BEDTIME
Qty: 0 | Refills: 0 | Status: DISCONTINUED | OUTPATIENT
Start: 2018-10-30 | End: 2018-11-02

## 2018-10-30 RX ORDER — ACETAMINOPHEN 500 MG
650 TABLET ORAL EVERY 4 HOURS
Qty: 0 | Refills: 0 | Status: DISCONTINUED | OUTPATIENT
Start: 2018-10-30 | End: 2018-11-02

## 2018-10-30 RX ADMIN — Medication 1 APPLICATION(S): at 23:08

## 2018-10-30 RX ADMIN — HEPARIN SODIUM 5000 UNIT(S): 5000 INJECTION INTRAVENOUS; SUBCUTANEOUS at 23:14

## 2018-10-30 RX ADMIN — Medication 81 MILLIGRAM(S): at 18:16

## 2018-10-30 RX ADMIN — Medication 100 MILLIGRAM(S): at 23:15

## 2018-10-30 RX ADMIN — SENNA PLUS 2 TABLET(S): 8.6 TABLET ORAL at 23:14

## 2018-10-30 RX ADMIN — ATORVASTATIN CALCIUM 40 MILLIGRAM(S): 80 TABLET, FILM COATED ORAL at 18:16

## 2018-10-30 NOTE — SWALLOW BEDSIDE ASSESSMENT ADULT - COMMENTS
Pt AA+Ox1 ("Toshia"), dysarthric, R-side gaze, notable lingual protrusion w/ L-side deviation, wet/gurgly vocal quality at baseline. Granddaughter & daughter present. HOB elevated to 45° Pt suctioned after trial

## 2018-10-30 NOTE — H&P ADULT - PMH
COPD (chronic obstructive pulmonary disease)    CVA (cerebral vascular accident)    HTN (hypertension)

## 2018-10-30 NOTE — H&P ADULT - HISTORY OF PRESENT ILLNESS
89 y/o F with a PMHx of CVA, HTN, COPD and no PSHx presents to ED by nursing home due to left sided facial droop x 9 am this morning. Pt at baseline is ao x1, bedbound with contractors on right arm, pt is also poor functioning at baseline. Pt takes Aspirin, no other blood thinners. Patient is more oriented at time of my examination, and is denying chest pain, shortness of breath, all other symptoms negative except for weakness.

## 2018-10-30 NOTE — H&P ADULT - NSHPLABSRESULTS_GEN_ALL_CORE
LABS:      CBC Full  -  ( 30 Oct 2018 11:32 )  WBC Count : 7.7 K/uL  Hemoglobin : 10.0 g/dL  Hematocrit : 30.3 %  Platelet Count - Automated : 169 K/uL  Mean Cell Volume : 85.2 fl  Mean Cell Hemoglobin : 28.1 pg  Mean Cell Hemoglobin Concentration : 33.0 gm/dL  Auto Neutrophil # : 4.2 K/uL  Auto Lymphocyte # : 2.2 K/uL  Auto Monocyte # : 0.6 K/uL  Auto Eosinophil # : 0.6 K/uL  Auto Basophil # : 0.1 K/uL  Auto Neutrophil % : 54.8 %  Auto Lymphocyte % : 28.1 %  Auto Monocyte % : 8.4 %  Auto Eosinophil % : 7.8 %  Auto Basophil % : 0.9 %    10-30    140  |  105  |  16  ----------------------------<  93  4.2   |  31  |  0.60    Ca    8.8      30 Oct 2018 11:32    TPro  7.1  /  Alb  2.6<L>  /  TBili  0.4  /  DBili  x   /  AST  16  /  ALT  15  /  AlkPhos  57  10-30          Urinalysis Basic - ( 30 Oct 2018 14:12 )    Color: Yellow / Appearance: Clear / S.010 / pH: x  Gluc: x / Ketone: Negative  / Bili: Negative / Urobili: Negative   Blood: x / Protein: 30 mg/dL / Nitrite: Negative   Leuk Esterase: Moderate / RBC: 0-2 /HPF / WBC 26-50 /HPF   Sq Epi: x / Non Sq Epi: x / Bacteria: Trace /HPF    RADIOLOGY & ADDITIONAL STUDIES (The following images were personally reviewed):      EXAM:  CT BRAIN                            PROCEDURE DATE:  10/30/2018          INTERPRETATION:  Head CT without IV contrast     Clinical Indication: CVA    Technique: Axial CT images of the head are obtained from the skull base   to the vertex without IV contrast.    Comparison: None.    Findings: There is no acute intracranial hemorrhage. There is hypodensity   at the right putamen with associated mild left midline shift (5 mm),   suggestive of an acute infarct. There is subtle loss of cortical   definition at the right insula, suggestive of acute right MCA territorial   infarct. Encephalomalacia in the right cerebellum, compatible with an old   infarct. Patchy hypodensities in the periventricular and subcortical   white matter bilaterally, compatible with chronic small vessel ischemic   disease. The ventricles maintain normal size, shape, and location. The   sulci are symmetric and normal for age bilaterally. No extra-axial fluid   collection. The visualized paranasal sinuses and orbits appear grossly   unremarkable. Partial opacifications in the mastoid air cells bilaterally.    Impression: No acute intracranial hemorrhage.     Apparent acute infarct in the right MCA territory and the right putamen   with associated mild left midline shift.    Chronic small vessel ischemic disease and old infarct.    CT Angio Head and Neck with IV contrast:    IMPRESSION:  Patent cervical and intracranial vessels without evidence of abrupt   vessel cut off, hemodynamically significant stenosis, aneurysm, or   dissection.    Noncontrast CT head demonstrates an acute to subacute infarct in the   right corona radiata, with involvement of the right posterior insula.

## 2018-10-30 NOTE — ED PROVIDER NOTE - OBJECTIVE STATEMENT
89 y/o F with a PMHx of CVA, HTN, COPD and no PSHx presents to ED by nursing home due to left sided facial droop x 9 am this morning. Pt at baseline is ao x1, bedbound with contractors on right arm, pt is also poor functioning at baseline. Pt takes Aspirin, no other blood thinners. Due to pt AMS, HPI and ROS are limited and unable to be completed fully. NKDA.

## 2018-10-30 NOTE — H&P ADULT - FAMILY HISTORY
Child  Still living? Unknown  Family history of COPD (chronic obstructive pulmonary disease), Age at diagnosis: Age Unknown

## 2018-10-30 NOTE — SWALLOW BEDSIDE ASSESSMENT ADULT - SWALLOW EVAL: DIAGNOSIS
Pt p/w s&s oropharyngeal dysphagia c/b weak labial seal, impaired bolus formation, slow A-P transport, oral stasis (primarily on Left), delayed initiation of swallow trigger, decreased hyolaryngeal elevation. Suspected premature spillage of thinner boluses to the hypopharynx.

## 2018-10-30 NOTE — ED ADULT NURSE NOTE - NSIMPLEMENTINTERV_GEN_ALL_ED
Implemented All Fall Risk Interventions:  [place] to call system. Call bell, personal items and telephone within reach. Instruct patient to call for assistance. Room bathroom lighting operational. Non-slip footwear when patient is off stretcher. Physically safe environment: no spills, clutter or unnecessary equipment. Stretcher in lowest position, wheels locked, appropriate side rails in place. Provide visual cue, wrist band, yellow gown, etc. Monitor gait and stability. Monitor for mental status changes and reorient to person, place, and time. Review medications for side effects contributing to fall risk. Reinforce activity limits and safety measures with patient and family.

## 2018-10-30 NOTE — H&P ADULT - ASSESSMENT
Patient admitted for acute stroke and further management      Patient has MOLST form in chart indicating she is FULL CODE with trial of intubation

## 2018-10-30 NOTE — SWALLOW BEDSIDE ASSESSMENT ADULT - SWALLOW EVAL: CRITERIA FOR SKILLED INTERVENTION MET
Pt was previously able to consume at least soft solids + thin liquids w/o difficulty/demonstrates skilled criteria for swallowing intervention

## 2018-10-30 NOTE — SWALLOW BEDSIDE ASSESSMENT ADULT - ASR SWALLOW ASPIRATION MONITOR
position upright (90Y)/cough/gurgly voice/oral hygiene/fever/pneumonia/upper respiratory infection/change of breathing pattern/throat clearing

## 2018-10-30 NOTE — SWALLOW BEDSIDE ASSESSMENT ADULT - SWALLOW EVAL: RECOMMENDED FEEDING/EATING TECHNIQUES
crush medication (when feasible)/maintain upright posture during/after eating for 30 mins/no straws/position upright (90 degrees)/oral hygiene/allow for swallow between intakes/check mouth frequently for oral residue/pocketing/turn head left/small sips/bites/alternate food with liquid

## 2018-10-30 NOTE — SWALLOW BEDSIDE ASSESSMENT ADULT - PHARYNGEAL PHASE
Delayed pharyngeal swallow/Decreased laryngeal elevation Wet vocal quality post oral intake/Cough post oral intake/Delayed pharyngeal swallow/Decreased laryngeal elevation Decreased laryngeal elevation/Delayed pharyngeal swallow/Wet vocal quality post oral intake/Multiple swallows

## 2018-10-30 NOTE — CONSULT NOTE ADULT - SUBJECTIVE AND OBJECTIVE BOX
Patient is a 89y old  Female who presents with a chief complaint of left sided facial droop on morning of admission (30 Oct 2018 15:13)      HPI:  89 y/o F with a PMHx of CVA, HTN, COPD and no PSHx presents to ED by nursing home due to left sided facial droop x 9 am this morning. Pt at baseline is ao x1, bedbound with contractors on right arm, pt is also poor functioning at baseline. Pt takes Aspirin, no other blood thinners. Patient is more oriented at time of my examination, and is denying chest pain, shortness of breath, all other symptoms negative except for weakness. (30 Oct 2018 15:13)         Neurological Review of Systems:  No difficulty with language.  No vision loss or double vision.  No dizziness, vertigo or new hearing loss.  No difficulty with speech or swallowing.  No focal weakness.  No focal sensory changes.  No numbness or tingling in the bilateral lower extremities.  No difficulty with balance.  No difficulty with ambulation.        MEDICATIONS  (STANDING):    MEDICATIONS  (PRN):    Allergies    Allergy Status Unknown    Intolerances      PAST MEDICAL & SURGICAL HISTORY:    FAMILY HISTORY:    SOCIAL HISTORY: non smoker/ former smoker/ active smoker    Review of Systems:  Constitutional: No generalized weakness. No fevers or chills.                    Eyes, Ears, Mouth, Throat: No vision loss   Respiratory: No shortness of breath or cough.                                Cardiovascular: No chest pain or palpitations  Gastrointestinal: No nausea or vomiting.                                         Genitourinary: No urinary incontinence or burning on urination.  Musculoskeletal: No joint pain.                                                           Dermatologic: No rash.  Neurological: as per HPI                                                                      Psychiatric: No behavioral problems.  Endocrine: No known hypoglycemia.               Hematologic/Lymphatic: No easy bleeding.    O:  Vital Signs Last 24 Hrs  T(C): 36.9 (30 Oct 2018 10:57), Max: 36.9 (30 Oct 2018 10:57)  T(F): 98.4 (30 Oct 2018 10:57), Max: 98.4 (30 Oct 2018 10:57)  HR: 91 (30 Oct 2018 14:57) (81 - 91)  BP: 151/81 (30 Oct 2018 14:57) (144/100 - 151/81)  BP(mean): --  RR: 18 (30 Oct 2018 14:57) (13 - 18)  SpO2: 99% (30 Oct 2018 14:57) (99% - 99%)    General Exam:   General appearance: No acute distress                 Cardiovascular: Pedal dorsalis pulses intact bilaterally    Mental Status: Orientated to self, date and place.  Attention intact.  No dysarthria, aphasia or neglect.  Knowledge intact.  Registration intact.  Short and long term memory grossly intact.      Cranial Nerves: CN I - not tested.  PERRL, EOMI, VFF, no nystagmus or diplopia.  No APD.  Fundi not visualized.  CN V1-3 intact to light touch and pinprick.  No facial asymmetry.  Hearing intact to finger rub bilaterally.  Tongue, uvula and palate midline.  Sternocleidomastoid and Trapezius intact bilaterally.    Motor:   Tone: normal.                  Strength intact throughout  No pronator drift bilaterally                      No dysmetria on finger-nose-finger or heel-shin-heel  No truncal ataxia.  No resting, postural or action tremor.  No myoclonus.    Sensation: intact to light touch, pinprick, vibration and proprioception    Deep Tendon Reflexes: 1+ bilateral biceps, triceps, brachioradialis, knee and ankle  Toes flexor bilaterally    Gait: normal and stable.  Rhomberg -derek.    Other:     LABS:                        10.0   7.7   )-----------( 169      ( 30 Oct 2018 11:32 )             30.3     10-30    140  |  105  |  16  ----------------------------<  93  4.2   |  31  |  0.60    Ca    8.8      30 Oct 2018 11:32    TPro  7.1  /  Alb  2.6<L>  /  TBili  0.4  /  DBili  x   /  AST  16  /  ALT  15  /  AlkPhos  57  10      Urinalysis Basic - ( 30 Oct 2018 14:12 )    Color: Yellow / Appearance: Clear / S.010 / pH: x  Gluc: x / Ketone: Negative  / Bili: Negative / Urobili: Negative   Blood: x / Protein: 30 mg/dL / Nitrite: Negative   Leuk Esterase: Moderate / RBC: 0-2 /HPF / WBC 26-50 /HPF   Sq Epi: x / Non Sq Epi: x / Bacteria: Trace /HPF          RADIOLOGY & ADDITIONAL STUDIES:    EKG:   < from: CT Head No Cont (10.30.18 @ 11:17) >  Impression: No acute intracranial hemorrhage.     Apparent acute infarct in the right MCA territory and the right putamen   with associated mild left midline shift.    Chronic small vessel ischemic disease and old infarct.    Dr. Jacobson is informed.    < end of copied text > Patient is a 89y old  Female who presents with a chief complaint of left sided facial droop on morning of admission (30 Oct 2018 15:13)      HPI:  89 y/o F with a PMHx of CVA, HTN, COPD and no PSHx presents to ED by nursing home due to left sided facial droop x 9 am this morning. Pt at baseline is ao x1, bedbound with contractors on right arm, pt is also poor functioning at baseline. Pt takes Aspirin, no other blood thinners. Stroke code was called in the ED, the case was dw Dr. Loyd and the ED staff.  IVtpa was not given.  The patient how appears more awake, no gaze deviation.      The patient is aphasic.  She is bedbound at baseline.      MEDICATIONS  (STANDING):    MEDICATIONS  (PRN):    Allergies    Allergy Status Unknown    Intolerances      PAST MEDICAL & SURGICAL HISTORY:    FAMILY HISTORY: reviwed nc daughter    SOCIAL HISTORY: non smoker    Review of Systems:  Constitutional:  No fevers.                    Eyes, Ears, Mouth, Throat: Unknown if has vision loss   Respiratory: No shortness of breath.                                Cardiovascular: Unknown if has chest pain or palpitations  Gastrointestinal: No vomiting.                                         Genitourinary: Unknown if has burning on urination.  Musculoskeletal: + joint pain.                                                           Dermatologic: No rash.  Neurological: as per HPI                                                                      Psychiatric: No known behavioral problems.  Endocrine: No known hypoglycemia.               Hematologic/Lymphatic: No known easy bleeding.    O:  Vital Signs Last 24 Hrs  T(C): 36.9 (30 Oct 2018 10:57), Max: 36.9 (30 Oct 2018 10:57)  T(F): 98.4 (30 Oct 2018 10:57), Max: 98.4 (30 Oct 2018 10:57)  HR: 91 (30 Oct 2018 14:57) (81 - 91)  BP: 151/81 (30 Oct 2018 14:57) (144/100 - 151/81)  BP(mean): --  RR: 18 (30 Oct 2018 14:57) (13 - 18)  SpO2: 99% (30 Oct 2018 14:57) (99% - 99%)    General Exam:   General appearance: No acute distress                 Cardiovascular: Pedal dorsalis pulses intact bilaterally    Mental Status: Orientated to self but  not to date and place.  Attention visually intact.  + dysarthria and brocca's aphasia.  No neglect.  Knowledge, Registration and memory unreliable.    Cranial Nerves: CN I - not tested.  PERRL, EOMI grossly, blinks to threat bl temporal areas. Fundi not visualized.  CN V1-3 intact to light touch grossly.  Left facial weakness (2).  Hearing intact to speach bilaterally.  Tongue deviates to the left. uvula and palate midline.  Sternocleidomastoid and Trapezius grossly intact bilaterally.    Motor:   Tone: decreased left and increased right.                  Strength: right arm 3, left arm 3, right leg 2, left leg 2         dysmetria on finger-nose-finger or heel-shin-heel unreliable due to weakness    Sensation: decrease on left to light touch, pinprick    Deep Tendon Reflexes: 1+ right biceps, triceps, brachioradialis, knee and 0 left  Toes extensor bilaterally    Gait: bedbound    Other: NIHSS 18    LABS:                        10.0   7.7   )-----------( 169      ( 30 Oct 2018 11:32 )             30.3     10-30    140  |  105  |  16  ----------------------------<  93  4.2   |  31  |  0.60    Ca    8.8      30 Oct 2018 11:32    TPro  7.1  /  Alb  2.6<L>  /  TBili  0.4  /  DBili  x   /  AST  16  /  ALT  15  /  AlkPhos  57  10-30      Urinalysis Basic - ( 30 Oct 2018 14:12 )    Color: Yellow / Appearance: Clear / S.010 / pH: x  Gluc: x / Ketone: Negative  / Bili: Negative / Urobili: Negative   Blood: x / Protein: 30 mg/dL / Nitrite: Negative   Leuk Esterase: Moderate / RBC: 0-2 /HPF / WBC 26-50 /HPF   Sq Epi: x / Non Sq Epi: x / Bacteria: Trace /HPF          RADIOLOGY & ADDITIONAL STUDIES:    EKG:   < from: CT Head No Cont (10.30.18 @ 11:17) >  Impression: No acute intracranial hemorrhage.     Apparent acute infarct in the right MCA territory and the right putamen   with associated mild left midline shift.    Chronic small vessel ischemic disease and old infarct.    Dr. Jacobson is informed.    < end of copied text > Patient is a 89y old  Female who presents with a chief complaint of left sided facial droop on morning of admission (30 Oct 2018 15:13)      HPI:  89 y/o F with a PMHx of CVA, HTN, COPD and no PSHx presents to ED by nursing home due to left sided facial droop x 9 am this morning. Pt at baseline is ao x1, bedbound with contractors on right arm, pt is also poor functioning at baseline. Pt takes Aspirin, no other blood thinners. Stroke code was called in the ED, the case was dw Dr. Loyd and the ED staff.  IVtpa was not given.  The patient how appears more awake, no gaze deviation.      The patient is aphasic.  She is bedbound at baseline.      MEDICATIONS  (STANDING): asa 81, zocor 10, lisinopril, mobin    MEDICATIONS  (PRN):    Allergies    Allergy Status Unknown    Intolerances    PAST MEDICAL & SURGICAL HISTORY:  COPD (chronic obstructive pulmonary disease)    CVA (cerebral vascular accident)    HTN (hypertension).  No significant past surgical history.    FAMILY HISTORY: reviwed nc daughter    SOCIAL HISTORY: non smoker    Review of Systems:  Constitutional:  No fevers.                    Eyes, Ears, Mouth, Throat: Unknown if has vision loss   Respiratory: No shortness of breath.                                Cardiovascular: Unknown if has chest pain or palpitations  Gastrointestinal: No vomiting.                                         Genitourinary: Unknown if has burning on urination.  Musculoskeletal: + joint pain.                                                           Dermatologic: No rash.  Neurological: as per HPI                                                                      Psychiatric: No known behavioral problems.  Endocrine: No known hypoglycemia.               Hematologic/Lymphatic: No known easy bleeding.    O:  Vital Signs Last 24 Hrs  T(C): 36.9 (30 Oct 2018 10:57), Max: 36.9 (30 Oct 2018 10:57)  T(F): 98.4 (30 Oct 2018 10:57), Max: 98.4 (30 Oct 2018 10:57)  HR: 91 (30 Oct 2018 14:57) (81 - 91)  BP: 151/81 (30 Oct 2018 14:57) (144/100 - 151/81)  BP(mean): --  RR: 18 (30 Oct 2018 14:57) (13 - 18)  SpO2: 99% (30 Oct 2018 14:57) (99% - 99%)    General Exam:   General appearance: No acute distress                 Cardiovascular: Pedal dorsalis pulses intact bilaterally    Mental Status: Orientated to self but  not to date and place.  Attention visually intact.  + dysarthria and brocca's aphasia.  No neglect.  Knowledge, Registration and memory unreliable.    Cranial Nerves: CN I - not tested.  PERRL, EOMI grossly, blinks to threat bl temporal areas. Fundi not visualized.  CN V1-3 intact to light touch grossly.  Left facial weakness (2).  Hearing intact to speach bilaterally.  Tongue deviates to the left. uvula and palate midline.  Sternocleidomastoid and Trapezius grossly intact bilaterally.    Motor:   Tone: decreased left and increased right.                  Strength: right arm 3, left arm 3, right leg 2, left leg 2         dysmetria on finger-nose-finger or heel-shin-heel unreliable due to weakness    Sensation: decrease on left to light touch, pinprick    Deep Tendon Reflexes: 1+ right biceps, triceps, brachioradialis, knee and 0 left  Toes extensor bilaterally    Gait: bedbound    Other: NIHSS 18    LABS:                        10.0   7.7   )-----------( 169      ( 30 Oct 2018 11:32 )             30.3     10-30    140  |  105  |  16  ----------------------------<  93  4.2   |  31  |  0.60    Ca    8.8      30 Oct 2018 11:32    TPro  7.1  /  Alb  2.6<L>  /  TBili  0.4  /  DBili  x   /  AST  16  /  ALT  15  /  AlkPhos  57  10      Urinalysis Basic - ( 30 Oct 2018 14:12 )    Color: Yellow / Appearance: Clear / S.010 / pH: x  Gluc: x / Ketone: Negative  / Bili: Negative / Urobili: Negative   Blood: x / Protein: 30 mg/dL / Nitrite: Negative   Leuk Esterase: Moderate / RBC: 0-2 /HPF / WBC 26-50 /HPF   Sq Epi: x / Non Sq Epi: x / Bacteria: Trace /HPF          RADIOLOGY & ADDITIONAL STUDIES:    EKG:   < from: CT Head No Cont (10.30.18 @ 11:17) >  Impression: No acute intracranial hemorrhage.     Apparent acute infarct in the right MCA territory and the right putamen   with associated mild left midline shift.    Chronic small vessel ischemic disease and old infarct.    Dr. Jacobson is informed.    < end of copied text >

## 2018-10-30 NOTE — ED PROVIDER NOTE - CRANIAL NERVE AND PUPILLARY EXAM
flexes on painful stimuli, left side facial droop, eyes deviate to right do not cross midline. Unable to follow commands to complete neuro exam- NIH Scale see code stroke

## 2018-10-30 NOTE — CONSULT NOTE ADULT - SUBJECTIVE AND OBJECTIVE BOX
Patient is a 89y old  Female who presents with a chief complaint of left sided facial droop on morning of admission (30 Oct 2018 15:18)      HPI:  89 y/o F with a PMHx of CVA, HTN, COPD and no PSHx presents to ED by nursing home due to left sided facial droop x 9 am this morning. Pt at baseline is ao x1, bedbound with contractors on right arm, pt is also poor functioning at baseline. Pt takes Aspirin, no other blood thinners. Patient is more oriented at time of my examination, and is denying chest pain, shortness of breath, all other symptoms negative except for weakness. (30 Oct 2018 15:13)         Neurological Review of Systems:  No difficulty with language.  No vision loss or double vision.  No dizziness, vertigo or new hearing loss.  No difficulty with speech or swallowing.  No focal weakness.  No focal sensory changes.  No numbness or tingling in the bilateral lower extremities.  No difficulty with balance.  No difficulty with ambulation.        MEDICATIONS  (STANDING):    MEDICATIONS  (PRN):    Allergies    Allergy Status Unknown    Intolerances      PAST MEDICAL & SURGICAL HISTORY:    FAMILY HISTORY:    SOCIAL HISTORY: non smoker/ former smoker/ active smoker    Review of Systems:  Constitutional: No generalized weakness. No fevers or chills.                    Eyes, Ears, Mouth, Throat: No vision loss   Respiratory: No shortness of breath or cough.                                Cardiovascular: No chest pain or palpitations  Gastrointestinal: No nausea or vomiting.                                         Genitourinary: No urinary incontinence or burning on urination.  Musculoskeletal: No joint pain.                                                           Dermatologic: No rash.  Neurological: as per HPI                                                                      Psychiatric: No behavioral problems.  Endocrine: No known hypoglycemia.               Hematologic/Lymphatic: No easy bleeding.    O:  Vital Signs Last 24 Hrs  T(C): 36.9 (30 Oct 2018 10:57), Max: 36.9 (30 Oct 2018 10:57)  T(F): 98.4 (30 Oct 2018 10:57), Max: 98.4 (30 Oct 2018 10:57)  HR: 91 (30 Oct 2018 14:57) (81 - 91)  BP: 151/81 (30 Oct 2018 14:57) (144/100 - 151/81)  BP(mean): --  RR: 18 (30 Oct 2018 14:57) (13 - 18)  SpO2: 99% (30 Oct 2018 14:57) (99% - 99%)    General Exam:   General appearance: No acute distress                 Cardiovascular: Pedal dorsalis pulses intact bilaterally    Mental Status: Orientated to self, date and place.  Attention intact.  No dysarthria, aphasia or neglect.  Knowledge intact.  Registration intact.  Short and long term memory grossly intact.      Cranial Nerves: CN I - not tested.  PERRL, EOMI, VFF, no nystagmus or diplopia.  No APD.  Fundi not visualized.  CN V1-3 intact to light touch and pinprick.  No facial asymmetry.  Hearing intact to finger rub bilaterally.  Tongue, uvula and palate midline.  Sternocleidomastoid and Trapezius intact bilaterally.    Motor:   Tone: normal.                  Strength intact throughout  No pronator drift bilaterally                      No dysmetria on finger-nose-finger or heel-shin-heel  No truncal ataxia.  No resting, postural or action tremor.  No myoclonus.    Sensation: intact to light touch, pinprick, vibration and proprioception    Deep Tendon Reflexes: 1+ bilateral biceps, triceps, brachioradialis, knee and ankle  Toes flexor bilaterally    Gait: normal and stable.  Rhomberg -derek.    Other:     LABS:                        10.0   7.7   )-----------( 169      ( 30 Oct 2018 11:32 )             30.3     10-30    140  |  105  |  16  ----------------------------<  93  4.2   |  31  |  0.60    Ca    8.8      30 Oct 2018 11:32    TPro  7.1  /  Alb  2.6<L>  /  TBili  0.4  /  DBili  x   /  AST  16  /  ALT  15  /  AlkPhos  57  10      Urinalysis Basic - ( 30 Oct 2018 14:12 )    Color: Yellow / Appearance: Clear / S.010 / pH: x  Gluc: x / Ketone: Negative  / Bili: Negative / Urobili: Negative   Blood: x / Protein: 30 mg/dL / Nitrite: Negative   Leuk Esterase: Moderate / RBC: 0-2 /HPF / WBC 26-50 /HPF   Sq Epi: x / Non Sq Epi: x / Bacteria: Trace /HPF          RADIOLOGY & ADDITIONAL STUDIES:    EKG:   < from: CT Head No Cont (10.30.18 @ 11:17) > (images reviewed)  Impression: No acute intracranial hemorrhage.     Apparent acute infarct in the right MCA territory and the right putamen   with associated mild left midline shift.    Chronic small vessel ischemic disease and old infarct.    < end of copied text >    < from: CT Angio Head w/ IV Cont (10.30.18 @ 11:58) >  IMPRESSION:  Patent cervical and intracranial vessels without evidence of abrupt   vessel cut off, hemodynamically significant stenosis, aneurysm, or   dissection.    Noncontrast CT head demonstrates an acute to subacute infarct in the   right corona radiata, with involvement of the right posterior insula.    < end of copied text >

## 2018-10-30 NOTE — ED ADULT NURSE NOTE - ED STAT RN HANDOFF DETAILS 2
report given to holding area , pt not in distress, on oxygen at 2L/min via NC, on remote tele NSR, IV left AC intact no swelling.

## 2018-10-30 NOTE — SWALLOW BEDSIDE ASSESSMENT ADULT - ORAL PHASE
Decreased anterior-posterior movement of the bolus/Delayed oral transit time/Lingual stasis Delayed oral transit time/Stasis in lateral sulci/Lingual stasis/Decreased anterior-posterior movement of the bolus Decreased anterior-posterior movement of the bolus/Delayed oral transit time/Stasis in lateral sulci/Lingual stasis

## 2018-10-30 NOTE — SWALLOW BEDSIDE ASSESSMENT ADULT - ASR SWALLOW LINGUAL MOBILITY
deviation to the Left/impaired left lateral movement/impaired right lateral movement/impaired protrusion/impaired anterior elevation

## 2018-10-30 NOTE — H&P ADULT - PROBLEM SELECTOR PLAN 1
c/w aspirin, statin for now  no EKG changes suggesting ischemia  c/w telemetry monitoring for now  f/u T2, T3 (T1 negative)  f/u neurology consult -  Dr. Cote  f/u lipid panel, HbA1c, TSH, Vit D, Vit B12  f/u echo  speech and swallow - pureed diet with honey thick, reassessment tomorrow  f/u

## 2018-10-30 NOTE — ED ADULT NURSE NOTE - OBJECTIVE STATEMENT
pt brought in as notification from nursing home for rt side weakness pt brought in as notification from nursing home for rt side weakness left facial weakness

## 2018-10-30 NOTE — SWALLOW BEDSIDE ASSESSMENT ADULT - SLP PERTINENT HISTORY OF CURRENT PROBLEM
89 y/o F from [facility], with a PMHx of CVA, HTN, COPD and no PSHx presents to ED by nursing home due to left sided facial droop x 9 am this morning. Pt at baseline is ao x1, bedbound with contraction on right arm.

## 2018-10-30 NOTE — H&P ADULT - PROBLEM SELECTOR PLAN 4
IMPROVE VTE Individual Risk Assessment          RISK                                                          Points  [  ] Previous VTE                                                3  [  ] Thrombophilia                                             2  [ x ] Lower limb paralysis                                   2        (unable to hold up >15 seconds)    [  ] Current Cancer                                             2         (within 6 months)  [ x ] Immobilization > 24 hrs                              1  [  ] ICU/CCU stay > 24 hours                             1  [ x ] Age > 60                                                         1    IMPROVE VTE Score: 4    c/w heparin for vte prophylaxis

## 2018-10-30 NOTE — H&P ADULT - NSHPPHYSICALEXAM_GEN_ALL_CORE
Vital Signs Last 24 Hrs  T(C): 36.9 (30 Oct 2018 16:13), Max: 36.9 (30 Oct 2018 10:57)  T(F): 98.4 (30 Oct 2018 16:13), Max: 98.4 (30 Oct 2018 10:57)  HR: 68 (30 Oct 2018 16:13) (68 - 91)  BP: 129/106 (30 Oct 2018 16:13) (129/106 - 151/81)  RR: 22 (30 Oct 2018 16:13) (13 - 22)  SpO2: 97% (30 Oct 2018 16:13) (97% - 99%)    ________________________________________________  PHYSICAL EXAM:  GENERAL: NAD  HEENT: Normocephalic;  conjunctivae and sclerae clear; moist mucous membranes;   NECK : supple  CHEST/LUNG: Clear to auscultation bilaterally with good air entry   HEART: S1 S2  regular; no murmurs, gallops or rubs  ABDOMEN: Soft, Nontender, Nondistended; Bowel sounds present  EXTREMITIES: no cyanosis; mild 1+ pitting edema on b/l LE; no calf tenderness  NERVOUS SYSTEM:  Awake and alert; Oriented  to place, person and time; left lower face palsy with flattening of nasolabial fold, bilateral LE 3/5 strength, 4/5 strength on bilateral upper extremities, right hand contracture - unchanged

## 2018-10-30 NOTE — ED PROVIDER NOTE - PROGRESS NOTE DETAILS
Spoke to radiologist. Informed me of signs of acute basilar infarct Spoke to Dr. Libman (Neurologist). Due to poor baseline functions, age and risk factors. Pt is poor candidate for tpa or endovascular intervention. No acute intervention at this time. Will admit. Sign out completely with Dr. Amos manzanares

## 2018-10-31 DIAGNOSIS — I63.9 CEREBRAL INFARCTION, UNSPECIFIED: ICD-10-CM

## 2018-10-31 DIAGNOSIS — R53.2 FUNCTIONAL QUADRIPLEGIA: ICD-10-CM

## 2018-10-31 DIAGNOSIS — Z51.5 ENCOUNTER FOR PALLIATIVE CARE: ICD-10-CM

## 2018-10-31 DIAGNOSIS — Z71.89 OTHER SPECIFIED COUNSELING: ICD-10-CM

## 2018-10-31 DIAGNOSIS — E43 UNSPECIFIED SEVERE PROTEIN-CALORIE MALNUTRITION: ICD-10-CM

## 2018-10-31 LAB
24R-OH-CALCIDIOL SERPL-MCNC: 27.5 NG/ML — LOW (ref 30–80)
ANION GAP SERPL CALC-SCNC: 6 MMOL/L — SIGNIFICANT CHANGE UP (ref 5–17)
BUN SERPL-MCNC: 16 MG/DL — SIGNIFICANT CHANGE UP (ref 7–18)
CALCIUM SERPL-MCNC: 9.7 MG/DL — SIGNIFICANT CHANGE UP (ref 8.4–10.5)
CHLORIDE SERPL-SCNC: 102 MMOL/L — SIGNIFICANT CHANGE UP (ref 96–108)
CHOLEST SERPL-MCNC: 156 MG/DL — SIGNIFICANT CHANGE UP (ref 10–199)
CO2 SERPL-SCNC: 30 MMOL/L — SIGNIFICANT CHANGE UP (ref 22–31)
CREAT SERPL-MCNC: 0.55 MG/DL — SIGNIFICANT CHANGE UP (ref 0.5–1.3)
GLUCOSE SERPL-MCNC: 105 MG/DL — HIGH (ref 70–99)
HBA1C BLD-MCNC: 5.4 % — SIGNIFICANT CHANGE UP (ref 4–5.6)
HCT VFR BLD CALC: 44.4 % — SIGNIFICANT CHANGE UP (ref 34.5–45)
HDLC SERPL-MCNC: 53 MG/DL — SIGNIFICANT CHANGE UP
HGB BLD-MCNC: 14.2 G/DL — SIGNIFICANT CHANGE UP (ref 11.5–15.5)
LIPID PNL WITH DIRECT LDL SERPL: 85 MG/DL — SIGNIFICANT CHANGE UP
MAGNESIUM SERPL-MCNC: 2.3 MG/DL — SIGNIFICANT CHANGE UP (ref 1.6–2.6)
MCHC RBC-ENTMCNC: 27.5 PG — SIGNIFICANT CHANGE UP (ref 27–34)
MCHC RBC-ENTMCNC: 32 GM/DL — SIGNIFICANT CHANGE UP (ref 32–36)
MCV RBC AUTO: 86.2 FL — SIGNIFICANT CHANGE UP (ref 80–100)
PHOSPHATE SERPL-MCNC: 3.6 MG/DL — SIGNIFICANT CHANGE UP (ref 2.5–4.5)
PLATELET # BLD AUTO: 241 K/UL — SIGNIFICANT CHANGE UP (ref 150–400)
POTASSIUM SERPL-MCNC: 3.9 MMOL/L — SIGNIFICANT CHANGE UP (ref 3.5–5.3)
POTASSIUM SERPL-SCNC: 3.9 MMOL/L — SIGNIFICANT CHANGE UP (ref 3.5–5.3)
RBC # BLD: 5.16 M/UL — SIGNIFICANT CHANGE UP (ref 3.8–5.2)
RBC # FLD: 13.8 % — SIGNIFICANT CHANGE UP (ref 10.3–14.5)
SODIUM SERPL-SCNC: 138 MMOL/L — SIGNIFICANT CHANGE UP (ref 135–145)
TOTAL CHOLESTEROL/HDL RATIO MEASUREMENT: 2.9 RATIO — LOW (ref 3.3–7.1)
TRIGL SERPL-MCNC: 88 MG/DL — SIGNIFICANT CHANGE UP (ref 10–149)
TROPONIN I SERPL-MCNC: 0.02 NG/ML — SIGNIFICANT CHANGE UP (ref 0–0.04)
TSH SERPL-MCNC: 1.93 UU/ML — SIGNIFICANT CHANGE UP (ref 0.34–4.82)
VIT B12 SERPL-MCNC: 893 PG/ML — SIGNIFICANT CHANGE UP (ref 232–1245)
WBC # BLD: 10.4 K/UL — SIGNIFICANT CHANGE UP (ref 3.8–10.5)
WBC # FLD AUTO: 10.4 K/UL — SIGNIFICANT CHANGE UP (ref 3.8–10.5)

## 2018-10-31 PROCEDURE — 99223 1ST HOSP IP/OBS HIGH 75: CPT | Mod: 25

## 2018-10-31 PROCEDURE — 99497 ADVNCD CARE PLAN 30 MIN: CPT

## 2018-10-31 RX ADMIN — Medication 1 APPLICATION(S): at 13:03

## 2018-10-31 RX ADMIN — ATORVASTATIN CALCIUM 40 MILLIGRAM(S): 80 TABLET, FILM COATED ORAL at 21:50

## 2018-10-31 RX ADMIN — Medication 1 APPLICATION(S): at 21:50

## 2018-10-31 RX ADMIN — LISINOPRIL 5 MILLIGRAM(S): 2.5 TABLET ORAL at 05:30

## 2018-10-31 RX ADMIN — Medication 100 MILLIGRAM(S): at 21:47

## 2018-10-31 RX ADMIN — HEPARIN SODIUM 5000 UNIT(S): 5000 INJECTION INTRAVENOUS; SUBCUTANEOUS at 05:29

## 2018-10-31 RX ADMIN — HEPARIN SODIUM 5000 UNIT(S): 5000 INJECTION INTRAVENOUS; SUBCUTANEOUS at 21:50

## 2018-10-31 RX ADMIN — Medication 81 MILLIGRAM(S): at 13:02

## 2018-10-31 RX ADMIN — Medication 1 DROP(S): at 17:08

## 2018-10-31 RX ADMIN — Medication 1 DROP(S): at 05:31

## 2018-10-31 RX ADMIN — HEPARIN SODIUM 5000 UNIT(S): 5000 INJECTION INTRAVENOUS; SUBCUTANEOUS at 13:03

## 2018-10-31 RX ADMIN — Medication 100 MILLIGRAM(S): at 13:02

## 2018-10-31 RX ADMIN — Medication 1 APPLICATION(S): at 06:55

## 2018-10-31 RX ADMIN — Medication 100 MILLIGRAM(S): at 05:30

## 2018-10-31 RX ADMIN — FAMOTIDINE 20 MILLIGRAM(S): 10 INJECTION INTRAVENOUS at 05:30

## 2018-10-31 RX ADMIN — SENNA PLUS 2 TABLET(S): 8.6 TABLET ORAL at 21:50

## 2018-10-31 NOTE — CONSULT NOTE ADULT - ASSESSMENT
89 y/o F with a PMHx of CVA, HTN, COPD and no PSHx presents to ED by nursing home due to left sided facial droop, RT MCA stroke.  1.Tele monitoring.  2.Echocardiogram.  3.Neurology eval noted.  4.AC as per neurology.  5.HTN-Ace.  6.Cont asa,statin.  7.COPD-stable.  8.GI and DVT prophylaxis.
Acute right MCA stroke in patient with prior stroke, likely embolic    Recommend:  TTE  tele x 24hours  LDL, HbA1C  asa81, lipitor 40  SBP goal <180   s/s  PT  DVT ppx

## 2018-10-31 NOTE — CONSULT NOTE ADULT - SUBJECTIVE AND OBJECTIVE BOX
HPI:  89 y/o F with a PMHx of CVA, HTN, COPD and no PSHx presents to ED by nursing home due to left sided facial droop x 9 am this morning. Pt at baseline is ao x1, bedbound with contractors on right arm, pt is also poor functioning at baseline. Pt takes Aspirin, no other blood thinners. Patient is more oriented at time of my examination, and is denying chest pain, shortness of breath, all other symptoms negative except for weakness. (30 Oct 2018 15:13)    Interval history: CT head showed subacute R CVA. Evaluated by neurology and cardiology. SLP done recommended for puree w honey thick liquids, spoon feed only. Family at bedside.       PAST MEDICAL & SURGICAL HISTORY:  COPD (chronic obstructive pulmonary disease)  HTN (hypertension)  CVA (cerebral vascular accident)  No significant past surgical history      SOCIAL HISTORY:  has 2 children, granddaughter  Admitted from:  [facility]    Preferred Language: Thai    Surrogate/HCP/Guardian:   Radha Yusuf daughter         Phone#: [phone]    FAMILY HISTORY:  Family history of COPD (chronic obstructive pulmonary disease) (Child)    Baseline ADLs (prior to admission): bedbound, interactive, dependent on ADLs    Allergies    Allergy Status Unknown    Intolerances      Present Symptoms:   Dyspnea: denies  Nausea/Vomiting: denies  Anxiety: denies  Depressed denies  Fatigue: +  Loss of appetite: +  Pain:             denies                   location:          Review of Systems: All others negative      MEDICATIONS  (STANDING):  artificial tears (preservative free) Ophthalmic Solution 1 Drop(s) Both EYES two times a day  aspirin enteric coated 81 milliGRAM(s) Oral daily  atorvastatin 40 milliGRAM(s) Oral at bedtime  docusate sodium Liquid 100 milliGRAM(s) Oral three times a day  famotidine Injectable 20 milliGRAM(s) IV Push every 24 hours  heparin  Injectable 5000 Unit(s) SubCutaneous every 8 hours  lisinopril 5 milliGRAM(s) Oral daily  senna 2 Tablet(s) Oral at bedtime  triamcinolone 0.025% Cream 1 Application(s) Topical three times a day    MEDICATIONS  (PRN):  acetaminophen   Tablet .. 650 milliGRAM(s) Oral every 4 hours PRN Moderate Pain (4 - 6)  sodium biphosphate Rectal Enema 1 Enema Rectal once PRN constipation      PHYSICAL EXAM:    Vital Signs Last 24 Hrs  T(C): 37.4 (31 Oct 2018 15:15), Max: 37.4 (31 Oct 2018 15:15)  T(F): 99.4 (31 Oct 2018 15:15), Max: 99.4 (31 Oct 2018 15:15)  HR: 94 (31 Oct 2018 15:15) (84 - 94)  BP: 114/86 (31 Oct 2018 15:15) (96/37 - 150/90)  BP(mean): --  RR: 18 (31 Oct 2018 15:15) (18 - 20)  SpO2: 96% (31 Oct 2018 15:15) (96% - 100%)       Karnofsky Performance Score/Palliative Performance Status Version2: 30%     GENERAL: alert, dysarthric, frail, NAD  HEENT: Atraumatic, oropharynx clear, neck supple  CHEST/LUNG: unlabored  HEART: Regular rate and rhythm    ABDOMEN: Soft, Nontender, Nondistended   MUSCULOSKELETAL:  No  edema,  bedbound  NERVOUS SYSTEM:  Alert & Oriented X1,  follows simple commands, R gaze preference  SKIN: No rashes or lesions noted  Oral intake: poor       LABS:                        14.2   10.4  )-----------( 241      ( 31 Oct 2018 07:04 )             44.4     10-31    138  |  102  |  16  ----------------------------<  105<H>  3.9   |  30  |  0.55    Ca    9.7      31 Oct 2018 07:04  Phos  3.6     10-31  Mg     2.3     10-31    TPro  7.1  /  Alb  2.6<L>  /  TBili  0.4  /  DBili  x   /  AST  16  /  ALT  15  /  AlkPhos  57  10-30    Urinalysis Basic - ( 30 Oct 2018 14:12 )    Color: Yellow / Appearance: Clear / S.010 / pH: x  Gluc: x / Ketone: Negative  / Bili: Negative / Urobili: Negative   Blood: x / Protein: 30 mg/dL / Nitrite: Negative   Leuk Esterase: Moderate / RBC: 0-2 /HPF / WBC 26-50 /HPF   Sq Epi: x / Non Sq Epi: x / Bacteria: Trace /HPF        RADIOLOGY & ADDITIONAL STUDIES:    ADVANCE DIRECTIVES:

## 2018-10-31 NOTE — CONSULT NOTE ADULT - SUBJECTIVE AND OBJECTIVE BOX
CHIEF COMPLAINT:Patient is a 89y old  Female who presents with a chief complaint of left sided facial droop on morning of admission.      HPI:  89 y/o F with a PMHx of CVA, HTN, COPD and no PSHx presents to ED by nursing home due to left sided facial droop x 9 am this morning. Pt at baseline is ao x1, bedbound with contractors on right arm, pt is also poor functioning at baseline. Pt takes Aspirin, no other blood thinners. Patient is more oriented at time of my examination, and is denying chest pain, shortness of breath, all other symptoms negative except for weakness. (30 Oct 2018 15:13)      PAST MEDICAL & SURGICAL HISTORY:  COPD (chronic obstructive pulmonary disease)  HTN (hypertension)  CVA (cerebral vascular accident)        MEDICATIONS  (STANDING):  artificial tears (preservative free) Ophthalmic Solution 1 Drop(s) Both EYES two times a day  aspirin enteric coated 81 milliGRAM(s) Oral daily  atorvastatin 40 milliGRAM(s) Oral at bedtime  docusate sodium Liquid 100 milliGRAM(s) Oral three times a day  famotidine Injectable 20 milliGRAM(s) IV Push every 24 hours  heparin  Injectable 5000 Unit(s) SubCutaneous every 8 hours  lisinopril 5 milliGRAM(s) Oral daily  senna 2 Tablet(s) Oral at bedtime  triamcinolone 0.025% Cream 1 Application(s) Topical three times a day    MEDICATIONS  (PRN):  acetaminophen   Tablet .. 650 milliGRAM(s) Oral every 4 hours PRN Moderate Pain (4 - 6)  sodium biphosphate Rectal Enema 1 Enema Rectal once PRN constipation      FAMILY HISTORY:  Family history of COPD (chronic obstructive pulmonary disease) (Child)      SOCIAL HISTORY:  unable to obtain      Allergies    Allergy Status Unknown    Intolerances    	    REVIEW OF SYSTEMS:  [x ] Unable to obtain    PHYSICAL EXAM:  T(C): 37 (10-31-18 @ 07:15), Max: 37.2 (10-30-18 @ 19:47)  HR: 84 (10-31-18 @ 07:15) (68 - 91)  BP: 132/73 (10-31-18 @ 07:15) (119/89 - 151/81)  RR: 20 (10-31-18 @ 07:15) (13 - 22)  SpO2: 100% (10-31-18 @ 07:15) (97% - 100%)  Wt(kg): --  I&O's Summary      Appearance: Normal	  HEENT:   Normal oral mucosa, PERRL, EOMI	  Lymphatic: No lymphadenopathy  Cardiovascular: Normal S1 S2, No JVD, No murmurs, No edema  Respiratory: Lungs clear to auscultation	  Gastrointestinal:  Soft, Non-tender, + BS	  Skin: No rashes, No ecchymoses, No cyanosis	  Extremities: Normal range of motion, No clubbing, cyanosis or edema  Vascular: Peripheral pulses palpable 2+ bilaterally    	    ECG:  nsr,low voltage	    	  LABS:	 	    CARDIAC MARKERS:  CARDIAC MARKERS ( 31 Oct 2018 01:03 )  0.017 ng/mL / x     / x     / x     / x      CARDIAC MARKERS ( 30 Oct 2018 19:08 )  0.021 ng/mL / x     / x     / x     / x      CARDIAC MARKERS ( 30 Oct 2018 11:32 )  0.015 ng/mL / x     / x     / x     / x                            14.2   10.4  )-----------( 241      ( 31 Oct 2018 07:04 )             44.4     10-31    138  |  102  |  16  ----------------------------<  105<H>  3.9   |  30  |  0.55    Ca    9.7      31 Oct 2018 07:04  Phos  3.6     10-31  Mg     2.3     10-31    TPro  7.1  /  Alb  2.6<L>  /  TBili  0.4  /  DBili  x   /  AST  16  /  ALT  15  /  AlkPhos  57  10-30      Lipid Profile: Cholesterol 156  LDL 85  HDL 53  TG 88      TSH: Thyroid Stimulating Hormone, Serum: 1.93 uU/mL (10-31 @ 07:04)      EXAM:  CT BRAIN                            PROCEDURE DATE:  10/30/2018          INTERPRETATION:  Head CT without IV contrast     Clinical Indication: CVA    Technique: Axial CT images of the head are obtained from the skull base   to the vertex without IV contrast.    Comparison: None.    Findings: There is no acute intracranial hemorrhage. There is hypodensity   at the right putamen with associated mild left midline shift (5 mm),   suggestive of an acute infarct. There is subtle loss of cortical   definition at the right insula, suggestive of acute right MCA territorial   infarct. Encephalomalacia in the right cerebellum, compatible with an old   infarct. Patchy hypodensities in the periventricular and subcortical   white matter bilaterally, compatible with chronic small vessel ischemic   disease. The ventricles maintain normal size, shape, and location. The   sulci are symmetric and normal for age bilaterally. No extra-axial fluid   collection. The visualized paranasal sinuses and orbits appear grossly   unremarkable. Partial opacifications in the mastoid air cells bilaterally.    Impression: No acute intracranial hemorrhage.     Apparent acute infarct in the right MCA territory and the right putamen   with associated mild left midline shift.    Chronic small vessel ischemic disease and old infarct.    Dr. Jacobson is informed.    INTERPRETATION:  CT ANGIOGRAPHY OF THE HEAD AND NECK  NONCONTRAST CT HEAD    HISTORY: 89 years old. Female. CVA.    COMPARISON: Noncontrast CT head 10/3/2018 11:09 AM.    TECHNIQUE:   1. Noncontrast CT head was performed.  2. CT angiographic evaluation of the head and neck was performed   consisting of contiguous axial sections scanned from the vertex to the   level of the aortic arch following the intravenous administration of   iodinated contrast. The acquired data was post-processed to generate   3D/MIP coronal, and sagittal reformats of the head and neck arterial   vasculature. Vascular stenosis is measured by NASCET criteria comparing   the narrowest segment with the distal luminal diameter as related to the   reported measure of arterial narrowing.    FINDINGS:    CT head:  There is an acute to subacute infarct in the right basal ganglia and   corona radiata. There appears to be involvement of the right posterior   insula. Mild mass effect without midline shift. No acute intracranial   hemorrhage.    The ventricles and cortical sulci are prominent reflecting parenchymal   volume loss.  Scattered hypodensities in the white matter are nonspecific, but likely   sequela small vessel ischemic disease.    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    Small mucus retention cyst/polyp in the left frontal sinus. And   opacification of the bilateral posterior mastoid air cells. The native   ocular lenses are surgically absent. Hyperostosis frontalis interna.    CTA head and neck:  Conventional arch anatomy. Great vessel origins are patent. Innominate   and visualized subclavian arteries are patent.    Anterior circulation:  Moderate right and mild left calcified and noncalcified atherosclerotic   plaque along the bilateral carotid bifurcations, without contributing to   significant stenosis. The cervical right internal carotid artery is   tortuous; the cervical internal carotid arteries are otherwise patent   without significant stenosis. Atherosclerotic calcifications along the   bilateral cavernous and supraclinoid internal carotid artery segments  without significant stenosis.    The anterior and middle cerebral arteries show normal caliber and   branching pattern.     Posterior circulation:  Bilateral vertebral arteries are patent from origins to intracranial   segments, with scattered atherosclerotic plaque along the bilateral V4   without causing hemodynamically significant stenosis. The basilar artery   is unremarkable. The posterior cerebral arteries are normal in caliber   and arise from the basilar tip.     Other:  No enlarged cervical adenopathy by size criteria.    The visualized lung apices are clear.    Multilevel degenerative changes of the cervical spine are noted.    IMPRESSION:  Patent cervical and intracranial vessels without evidence of abrupt   vessel cut off, hemodynamically significant stenosis, aneurysm, or   dissection.    Noncontrast CT head demonstrates an acute to subacute infarct in the   right corona radiata, with involvement of the right posterior insula.

## 2018-10-31 NOTE — CONSULT NOTE ADULT - PROBLEM SELECTOR RECOMMENDATION 3
Debility, prior CVA, dysphagia 2/2 new CVA, diet as tolerated. SLP eval noted, nutrition eval. Family does not want feeding tubes, prefer comfort feeds as tolerated for now.

## 2018-10-31 NOTE — CONSULT NOTE ADULT - REASON FOR ADMISSION
left sided facial droop on morning of admission

## 2018-10-31 NOTE — CONSULT NOTE ADULT - PROBLEM SELECTOR RECOMMENDATION 4
Met with son, daughter and granddaughter at bedside regarding current clinical condition, trajectory of illness, goals of care. They expressed that they do not wish for the patient to suffer and want to keep her as comfortable as possible. Risks/benefits of CPR/intubation addressed- they do not want CPR or intubation if her heart stops or she stops breathing and prefer to allow for natural death. They do not want invasive procedures including feeding tubes/PEG at this time. They are agreeable for the patient to return to LTC with hospice services upon discharge. They wish to have the echo done prior to discharge. All questions answered, support provided. SW referral made.     ___25__minutes spent in Advance Care Planning discussion (min 16-30+)  _____minutes spent in Advance Care Planning discussion ([phone])      Discussed with son, daughter, granddaughter.   About: Cardiac and respiratory resuscitative measures including CPR, shock, vasopressors, invasive ventilatory support via intubation.  All risks and benefits discussed. All questions answered.         [    ]  Health Care Proxy form filled out.   [  x  ]  MOLST form  filled out for DNR/DNI, no feeding tubes, comfort measures only, DNH, no central lines or pressors. Trial of IVF, antibiotics ok.   [ x   ]   Please refer to [facility] documentation in EMR for further details.

## 2018-11-01 RX ADMIN — Medication 100 MILLIGRAM(S): at 05:59

## 2018-11-01 RX ADMIN — Medication 1 APPLICATION(S): at 06:00

## 2018-11-01 RX ADMIN — ATORVASTATIN CALCIUM 40 MILLIGRAM(S): 80 TABLET, FILM COATED ORAL at 21:22

## 2018-11-01 RX ADMIN — Medication 100 MILLIGRAM(S): at 13:07

## 2018-11-01 RX ADMIN — Medication 1 APPLICATION(S): at 21:22

## 2018-11-01 RX ADMIN — Medication 100 MILLIGRAM(S): at 21:22

## 2018-11-01 RX ADMIN — Medication 1 APPLICATION(S): at 13:07

## 2018-11-01 RX ADMIN — HEPARIN SODIUM 5000 UNIT(S): 5000 INJECTION INTRAVENOUS; SUBCUTANEOUS at 21:22

## 2018-11-01 RX ADMIN — HEPARIN SODIUM 5000 UNIT(S): 5000 INJECTION INTRAVENOUS; SUBCUTANEOUS at 05:59

## 2018-11-01 RX ADMIN — Medication 1 DROP(S): at 18:35

## 2018-11-01 RX ADMIN — HEPARIN SODIUM 5000 UNIT(S): 5000 INJECTION INTRAVENOUS; SUBCUTANEOUS at 13:07

## 2018-11-01 RX ADMIN — Medication 81 MILLIGRAM(S): at 13:07

## 2018-11-01 RX ADMIN — LISINOPRIL 5 MILLIGRAM(S): 2.5 TABLET ORAL at 06:00

## 2018-11-01 RX ADMIN — FAMOTIDINE 20 MILLIGRAM(S): 10 INJECTION INTRAVENOUS at 05:59

## 2018-11-01 RX ADMIN — Medication 1 DROP(S): at 06:00

## 2018-11-01 RX ADMIN — SENNA PLUS 2 TABLET(S): 8.6 TABLET ORAL at 21:22

## 2018-11-02 VITALS
TEMPERATURE: 99 F | HEART RATE: 97 BPM | SYSTOLIC BLOOD PRESSURE: 114 MMHG | OXYGEN SATURATION: 95 % | DIASTOLIC BLOOD PRESSURE: 94 MMHG | RESPIRATION RATE: 19 BRPM

## 2018-11-02 PROCEDURE — 99232 SBSQ HOSP IP/OBS MODERATE 35: CPT

## 2018-11-02 RX ORDER — SODIUM CHLORIDE 9 MG/ML
1000 INJECTION, SOLUTION INTRAVENOUS
Qty: 0 | Refills: 0 | Status: DISCONTINUED | OUTPATIENT
Start: 2018-11-02 | End: 2018-11-02

## 2018-11-02 RX ORDER — ASPIRIN/CALCIUM CARB/MAGNESIUM 324 MG
1 TABLET ORAL
Qty: 0 | Refills: 0 | COMMUNITY
Start: 2018-11-02

## 2018-11-02 RX ORDER — ATORVASTATIN CALCIUM 80 MG/1
1 TABLET, FILM COATED ORAL
Qty: 0 | Refills: 0 | COMMUNITY
Start: 2018-11-02

## 2018-11-02 RX ORDER — FAMOTIDINE 10 MG/ML
2 INJECTION INTRAVENOUS
Qty: 0 | Refills: 0 | COMMUNITY
Start: 2018-11-02

## 2018-11-02 RX ORDER — LISINOPRIL 2.5 MG/1
1 TABLET ORAL
Qty: 0 | Refills: 0 | COMMUNITY
Start: 2018-11-02

## 2018-11-02 RX ORDER — DOCUSATE SODIUM 100 MG
10 CAPSULE ORAL
Qty: 0 | Refills: 0 | COMMUNITY
Start: 2018-11-02

## 2018-11-02 RX ADMIN — Medication 1 APPLICATION(S): at 13:24

## 2018-11-02 RX ADMIN — LISINOPRIL 5 MILLIGRAM(S): 2.5 TABLET ORAL at 06:26

## 2018-11-02 RX ADMIN — Medication 81 MILLIGRAM(S): at 11:46

## 2018-11-02 RX ADMIN — Medication 1 DROP(S): at 06:29

## 2018-11-02 RX ADMIN — HEPARIN SODIUM 5000 UNIT(S): 5000 INJECTION INTRAVENOUS; SUBCUTANEOUS at 06:26

## 2018-11-02 RX ADMIN — Medication 100 MILLIGRAM(S): at 13:24

## 2018-11-02 RX ADMIN — Medication 100 MILLIGRAM(S): at 06:26

## 2018-11-02 RX ADMIN — SODIUM CHLORIDE 50 MILLILITER(S): 9 INJECTION, SOLUTION INTRAVENOUS at 15:20

## 2018-11-02 RX ADMIN — FAMOTIDINE 20 MILLIGRAM(S): 10 INJECTION INTRAVENOUS at 06:26

## 2018-11-02 RX ADMIN — Medication 1 APPLICATION(S): at 06:26

## 2018-11-02 RX ADMIN — HEPARIN SODIUM 5000 UNIT(S): 5000 INJECTION INTRAVENOUS; SUBCUTANEOUS at 13:24

## 2018-11-02 NOTE — PROGRESS NOTE ADULT - PROBLEM SELECTOR PROBLEM 1
Cerebrovascular accident (CVA), unspecified mechanism
Cerebrovascular accident (CVA), unspecified mechanism
CVA (cerebral vascular accident)
Cerebrovascular accident (CVA), unspecified mechanism
Cerebrovascular accident (CVA), unspecified mechanism

## 2018-11-02 NOTE — PROGRESS NOTE ADULT - PROBLEM SELECTOR PLAN 3
ACEI: lisinopril 5   Monitor BP
ACEI: lisinopril 5   Monitor BP
1- Bronchodilators.  2- O2 as needed.  3- Observe for any respiratory symptoms.
c/w carvedilol with parameters  continue to monitor BP  GOAL BP: <180

## 2018-11-02 NOTE — PROGRESS NOTE ADULT - PROBLEM SELECTOR PLAN 2
not in exacerbation
not in exacerbation
1- Carvedilol with parameters.  2- Continue to monitor BP.   3- Cardiology management and follow up.
not in exacerbation

## 2018-11-02 NOTE — DISCHARGE NOTE ADULT - NSTOBACCOWEBSITE_GEN_A_CS
[place] website --- [email or web address] --- [email or web address]
[place] website [place] [email or web address] --- [email or web address]

## 2018-11-02 NOTE — DISCHARGE NOTE ADULT - MEDICATION SUMMARY - MEDICATIONS TO STOP TAKING
I will STOP taking the medications listed below when I get home from the hospital:    Zocor 10 mg oral tablet  -- 1 tab(s) by mouth once a day (at bedtime)

## 2018-11-02 NOTE — PROGRESS NOTE ADULT - PROBLEM SELECTOR PROBLEM 3
HTN (hypertension)
HTN (hypertension)
COPD (chronic obstructive pulmonary disease)
HTN (hypertension)

## 2018-11-02 NOTE — PROGRESS NOTE ADULT - REASON FOR ADMISSION
left sided facial droop on morning of admission

## 2018-11-02 NOTE — DISCHARGE NOTE ADULT - NS MD DC FALL RISK RISK
For information on Fall & Injury Prevention, visit [email or web address]
For information on Fall & Injury Prevention, visit [email or web address]

## 2018-11-02 NOTE — DISCHARGE NOTE ADULT - NSTOBACCOHOTLINE_GEN_A_CS
[facility] Smokers Quitline (334-XY-RGNCZ)
[facility] Smokers Quitline (932-JD-FXGID)

## 2018-11-02 NOTE — DISCHARGE NOTE ADULT - PATIENT PORTAL LINK FT
You can access the [facility] Patient Portal, offered by [facility], by registering with the following website: [email or web address]
You can access the [facility] Portal, offered by [facility], by registering with the following website: [email or web address]

## 2018-11-02 NOTE — PROGRESS NOTE ADULT - PROBLEM SELECTOR PLAN 4
palliative consult for GOC discussion  DNR/DNI/No feeding tube/   Family : long term with hospice
palliative consult for GOC discussion  DNR/DNI/No feeding tube/   family wants to discuss again about hospice vs going back to [facility]
palliative consult for GOC discussion

## 2018-11-02 NOTE — PROGRESS NOTE ADULT - SUBJECTIVE AND OBJECTIVE BOX
MEDICATIONS  (STANDING):  artificial tears (preservative free) Ophthalmic Solution 1 Drop(s) Both EYES two times a day  aspirin enteric coated 81 milliGRAM(s) Oral daily  atorvastatin 40 milliGRAM(s) Oral at bedtime  docusate sodium Liquid 100 milliGRAM(s) Oral three times a day  famotidine Injectable 20 milliGRAM(s) IV Push every 24 hours  heparin  Injectable 5000 Unit(s) SubCutaneous every 8 hours  lisinopril 5 milliGRAM(s) Oral daily  senna 2 Tablet(s) Oral at bedtime  triamcinolone 0.025% Cream 1 Application(s) Topical three times a day    MEDICATIONS  (PRN):  acetaminophen   Tablet .. 650 milliGRAM(s) Oral every 4 hours PRN Moderate Pain (4 - 6)  sodium biphosphate Rectal Enema 1 Enema Rectal once PRN constipation      Allergies:  Allergies    Allergy Status Unknown    Intolerances        ROS  [ x ] UNABLE TO ELICIT    General:  [  ] None  [  ] Fever  [  ] Chills  [  ] Malaise    Skin:  [  ] None [  ] Rash  [  ] Wound  [  ] Ulcer    HEENT:  [  ] None  [  ] Sore Throat  [  ] Nasal congestion/ runny nose  [  ] Photophobia [  ] Neck pain      Chest:  [  ] None   [  ] SOB  [  ] Cough  [  ] None    Cardiovascular:   [  ] None  [  ] CP  [  ] Palpitation    Gastrointestinal:  [  ] None  [  ] Abd pain   [  ] Nausea    [  ] Vomiting   [  ] Diarrhea	     Genitourinary:  [  ] None [  ] Polyuria   [  ] Urgency  [  ] Frequency  [  ] Dysuria    [  ]  Hematuria       Musculoskeletal:  [  ] None [  ] Back Pain	[  ] Body aches  [  ] Joint pain    Neurological: [  ] None [  ]Dizziness  [  ]Visual Disturbance  [  ]Headaches   [  ] Weakness          PHYSICAL EXAM:    Vital Signs Last 24 Hrs  T(C): 37.4 (31 Oct 2018 15:15), Max: 37.4 (31 Oct 2018 15:15)  T(F): 99.4 (31 Oct 2018 15:15), Max: 99.4 (31 Oct 2018 15:15)  HR: 94 (31 Oct 2018 15:15) (84 - 94)  BP: 114/86 (31 Oct 2018 15:15) (96/37 - 150/90)  BP(mean): --  RR: 18 (31 Oct 2018 15:15) (18 - 20)  SpO2: 96% (31 Oct 2018 15:15) (96% - 100%)    HEENT: [  ] Wnl  [ x ] Left side facial droop    Neck:  [ x ] Supple  [  ]Lymphadenopathy  [  ] No JVD   [  ] JVD  [  ] Masses   [  ] WNL    CHEST/Respiratory:  [ x ]Clear to auscultation  [  ] Rales   [  ] Rhonchi   [  ] Wheezing     [  ] Chest Tenderness      Cardiovascular:  [ x ] Reg S1 S2   [  ] Irreg S1 S2   [ x ]No Murmur  [  ] +ve Murmurs  [  ]Systolic [  ]Diastolic      Abdomen:  [ x ] Soft  [ x ] No tendrerness  [  ] Tenderness  [  ] Organomegaly  [  ] ABD Distention  [  ] Rigidity                       [ x ] No Regidity                       [ x ] No Rebound Tenderness  [  ] No Guarding Rigidity  [  ] Rebound Tenderness[  ] Guarding Rigidity                          [ x ]  +ve Bowel Sounds  [  ] Decreased Bowel Sounds    [  ] Absent Bowel Sounds                            Extremities: [ x ] No edema [  ] Edema  [  ] Clubbing   [  ] Cyanosis                         [ x ] No Tender Calf muscles  [  ] Tender Calf muscles                        [ x ] Palpable peripheral pulses    Neurological: [  ] Awake  [  ] Alert  [ x ] Oriented  x  1                           [  ] Confused  [ x ] Drowzy  [  ] repond to painful stimuli  [  ] Unresponsive    Skin:  [ x ] Intact [  ] Redness [  ] Thrombophlebitis  [  ] Rashes  [  ] Dry  [  ] Ulcers    Ortho:  [  ] Joint Swelling  [  ] Joint erythema [  ] Joint tenderness                [  ] Increased temp. to touch  [  ] DJD [ x ] Contracted right hand.            LABS/DIAGNOSTIC TESTS                          14.2   10.4  )-----------( 241      ( 31 Oct 2018 07:04 )             44.4         10-31    138  |  102  |  16  ----------------------------<  105<H>  3.9   |  30  |  0.55    Ca    9.7      31 Oct 2018 07:04  Phos  3.6     10-31  Mg     2.3     10-31    TPro  7.1  /  Alb  2.6<L>  /  TBili  0.4  /  DBili  x   /  AST  16  /  ALT  15  /  AlkPhos  57  10-30      LIVER FUNCTIONS - ( 30 Oct 2018 11:32 )  Alb: 2.6 g/dL / Pro: 7.1 g/dL / ALK PHOS: 57 U/L / ALT: 15 U/L DA / AST: 16 U/L / GGT: x               CULTURES:   None.    RADIOLOGY    EXAM:  CT ANGIO NECK (W)AW IC                          EXAM:  CT ANGIO BRAIN (W)AW IC                            PROCEDURE DATE:  10/30/2018          INTERPRETATION:  CT ANGIOGRAPHY OF THE HEAD AND NECK  NONCONTRAST CT HEAD    HISTORY: 89 years old. Female. CVA.    COMPARISON: Noncontrast CT head 10/3/2018 11:09 AM.    TECHNIQUE:   1. Noncontrast CT head was performed.  2. CT angiographic evaluation of the head and neck was performed   consisting of contiguous axial sections scanned from the vertex to the   level of the aortic arch following the intravenous administration of   iodinated contrast. The acquired data was post-processed to generate   3D/MIP coronal, and sagittal reformats of the head and neck arterial   vasculature. Vascular stenosis is measured by NASCET criteria comparing   the narrowest segment with the distal luminal diameter as related to the   reported measure of arterial narrowing.    FINDINGS:    CT head:  There is an acute to subacute infarct in the right basal ganglia and   corona radiata. There appears to be involvement of the right posterior   insula. Mild mass effect without midline shift. No acute intracranial   hemorrhage.    The ventricles and cortical sulci are prominent reflecting parenchymal   volume loss.  Scattered hypodensities in the white matter are nonspecific, but likely   sequela small vessel ischemic disease.    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    Small mucus retention cyst/polyp in the left frontal sinus. And   opacification of the bilateral posterior mastoid air cells. The native   ocular lenses are surgically absent. Hyperostosis frontalis interna.    CTA head and neck:  Conventional arch anatomy. Great vessel origins are patent. Innominate   and visualized subclavian arteries are patent.    Anterior circulation:  Moderate right and mild left calcified and noncalcified atherosclerotic   plaque along the bilateral carotid bifurcations, without contributing to   significant stenosis. The cervical right internal carotid artery is   tortuous; the cervical internal carotid arteries are otherwise patent   without significant stenosis. Atherosclerotic calcifications along the   bilateral cavernous and supraclinoid internal carotid artery segments  without significant stenosis.    The anterior and middle cerebral arteries show normal caliber and   branching pattern.     Posterior circulation:  Bilateral vertebral arteries are patent from origins to intracranial   segments, with scattered atherosclerotic plaque along the bilateral V4   without causing hemodynamically significant stenosis. The basilar artery   is unremarkable. The posterior cerebral arteries are normal in caliber   and arise from the basilar tip.     Other:  No enlarged cervical adenopathy by size criteria.    The visualized lung apices are clear.    Multilevel degenerative changes of the cervical spine are noted.    IMPRESSION:  Patent cervical and intracranial vessels without evidence of abrupt   vessel cut off, hemodynamically significant stenosis, aneurysm, or   dissection.    Noncontrast CT head demonstrates an acute to subacute infarct in the   right corona radiata, with involvement of the right posterior insula.          EXAM:  XR CHEST PORTABLE IMMED 1V                            PROCEDURE DATE:  10/30/2018          INTERPRETATION:  Altered mental status.    AP chest.    Heart size not accurately evaluated on this AP film. Atherosclerotic   aorta. Very low lung volumes. Grossly clear lungs. Bilateral calcified   hilar lymph nodes. No consolidation or effusion. Moderate thoracic   dextroscoliosis.    Impression: Grossly clear lungs.
PGY 1 Note discussed with supervising resident and primary attending    Patient is a 89y old  Female who presents with a chief complaint of left sided facial droop on morning of admission (02 Nov 2018 15:59)      INTERVAL HPI/OVERNIGHT EVENTS:   Patient seen at the bedside. No new complains.     MEDICATIONS  (STANDING):  artificial tears (preservative free) Ophthalmic Solution 1 Drop(s) Both EYES two times a day  aspirin enteric coated 81 milliGRAM(s) Oral daily  atorvastatin 40 milliGRAM(s) Oral at bedtime  dextrose 5% 1000 milliLiter(s) (50 mL/Hr) IV Continuous <Continuous>  docusate sodium Liquid 100 milliGRAM(s) Oral three times a day  famotidine Injectable 20 milliGRAM(s) IV Push every 24 hours  heparin  Injectable 5000 Unit(s) SubCutaneous every 8 hours  lisinopril 5 milliGRAM(s) Oral daily  senna 2 Tablet(s) Oral at bedtime  triamcinolone 0.025% Cream 1 Application(s) Topical three times a day    MEDICATIONS  (PRN):  acetaminophen   Tablet .. 650 milliGRAM(s) Oral every 4 hours PRN Moderate Pain (4 - 6)  sodium biphosphate Rectal Enema 1 Enema Rectal once PRN constipation      __________________________________________________  REVIEW OF SYSTEMS:    unable to review as patient is AAO x1 at baseline     Vital Signs Last 24 Hrs  T(C): 37.1 (02 Nov 2018 15:38), Max: 37.1 (01 Nov 2018 19:37)  T(F): 98.7 (02 Nov 2018 15:38), Max: 98.8 (01 Nov 2018 19:37)  HR: 97 (02 Nov 2018 15:38) (87 - 108)  BP: 114/94 (02 Nov 2018 15:38) (112/69 - 129/62)  BP(mean): --  RR: 19 (02 Nov 2018 15:38) (17 - 19)  SpO2: 95% (02 Nov 2018 15:38) (93% - 100%)    ________________________________________________  PHYSICAL EXAM:  GENERAL: NAD  HEENT: Normocephalic;  conjunctivae and sclerae clear; moist mucous membranes;   NECK : supple  CHEST/LUNG: Clear to auscultation bilaterally with good air entry   HEART: S1 S2  regular; no murmurs, gallops or rubs  ABDOMEN: Soft, Nontender, Nondistended; Bowel sounds present  EXTREMITIES: no cyanosis; no edema; no calf tenderness  SKIN: warm and dry; no rash  NERVOUS SYSTEM:  AAo x1   _________________________________________________  LABS:              CAPILLARY BLOOD GLUCOSE            RADIOLOGY & ADDITIONAL TESTS:    Imaging Personally Reviewed:  YES/NO    Consultant(s) Notes Reviewed:   YES/ No    Care Discussed with Consultants :     Plan of care was discussed with patient and /or primary care giver; all questions and concerns were addressed and care was aligned with patient's wishes.
PGY 1 Note discussed with supervising resident and primary attending    Patient is a 89y old  Female who presents with a chief complaint of left sided facial droop on morning of admission (2018 09:26)      INTERVAL HPI/OVERNIGHT EVENTS:  Patient seen at the bedside. no over night events       MEDICATIONS  (STANDING):  artificial tears (preservative free) Ophthalmic Solution 1 Drop(s) Both EYES two times a day  aspirin enteric coated 81 milliGRAM(s) Oral daily  atorvastatin 40 milliGRAM(s) Oral at bedtime  docusate sodium Liquid 100 milliGRAM(s) Oral three times a day  famotidine Injectable 20 milliGRAM(s) IV Push every 24 hours  heparin  Injectable 5000 Unit(s) SubCutaneous every 8 hours  lisinopril 5 milliGRAM(s) Oral daily  senna 2 Tablet(s) Oral at bedtime  triamcinolone 0.025% Cream 1 Application(s) Topical three times a day    MEDICATIONS  (PRN):  acetaminophen   Tablet .. 650 milliGRAM(s) Oral every 4 hours PRN Moderate Pain (4 - 6)  sodium biphosphate Rectal Enema 1 Enema Rectal once PRN constipation      __________________________________________________  REVIEW OF SYSTEMS:    unable ot review as patient is AAOx 1 at baseline       Vital Signs Last 24 Hrs  T(C): 36.9 (2018 11:31), Max: 37.4 (31 Oct 2018 15:15)  T(F): 98.4 (2018 11:31), Max: 99.4 (31 Oct 2018 15:15)  HR: 93 (2018 11:31) (93 - 112)  BP: 114/66 (2018 11:31) (95/50 - 146/86)  BP(mean): --  RR: 18 (2018 11:31) (18 - 20)  SpO2: 93% (2018 11:31) (93% - 98%)    ________________________________________________  PHYSICAL EXAM:  GENERAL: bedbound, non verbal at baseline  HEENT: Normocephalic;  conjunctivae and sclerae clear; moist mucous membranes;   NECK : supple  CHEST/LUNG: Clear to auscultation bilaterally with good air entry   HEART: S1 S2  regular; no murmurs, gallops or rubs  ABDOMEN: Soft, Nontender, Nondistended; Bowel sounds present  EXTREMITIES: no cyanosis; no edema; no calf tenderness  SKIN: warm and dry; no rash  NERVOUS SYSTEM:  Awake and alert; Oriented  to place, person and time ; no new deficits    _________________________________________________  LABS:                        14.2   10.4  )-----------( 241      ( 31 Oct 2018 07:04 )             44.4     10-    138  |  102  |  16  ----------------------------<  105<H>  3.9   |  30  |  0.55    Ca    9.7      31 Oct 2018 07:04  Phos  3.6     10-  Mg     2.3     10-31        Urinalysis Basic - ( 30 Oct 2018 14:12 )    Color: Yellow / Appearance: Clear / S.010 / pH: x  Gluc: x / Ketone: Negative  / Bili: Negative / Urobili: Negative   Blood: x / Protein: 30 mg/dL / Nitrite: Negative   Leuk Esterase: Moderate / RBC: 0-2 /HPF / WBC 26-50 /HPF   Sq Epi: x / Non Sq Epi: x / Bacteria: Trace /HPF      CAPILLARY BLOOD GLUCOSE            RADIOLOGY & ADDITIONAL TESTS:    Imaging Personally Reviewed:  YES/NO    Consultant(s) Notes Reviewed:   YES/ No    Care Discussed with Consultants :     Plan of care was discussed with patient and /or primary care giver; all questions and concerns were addressed and care was aligned with patient's wishes.
CHIEF COMPLAINT:Patient is a 89y old  Female who presents with a chief complaint of left sided facial droop on morning of admission.Pt appears comfortable.    	  REVIEW OF SYSTEMS:  [ x] Unable to obtain    PHYSICAL EXAM:  T(C): 36.3 (11-02-18 @ 07:27), Max: 37.9 (11-01-18 @ 15:15)  HR: 87 (11-02-18 @ 07:27) (87 - 108)  BP: 116/63 (11-02-18 @ 07:27) (112/69 - 120/65)  RR: 18 (11-02-18 @ 07:27) (17 - 18)  SpO2: 94% (11-02-18 @ 07:27) (93% - 100%)  Wt(kg): --  I&O's Summary    01 Nov 2018 07:01  -  02 Nov 2018 07:00  --------------------------------------------------------  IN: 0 mL / OUT: 450 mL / NET: -450 mL        Appearance: Normal	  HEENT:   Normal oral mucosa, PERRL, EOMI	  Lymphatic: No lymphadenopathy  Cardiovascular: Normal S1 S2, No JVD, No murmurs, No edema  Respiratory: Lungs clear to auscultation	  Gastrointestinal:  Soft, Non-tender, + BS	  Skin: No rashes, No ecchymoses, No cyanosis	  Extremities: Normal range of motion, No clubbing, cyanosis or edema  Vascular: Peripheral pulses palpable 2+ bilaterally    MEDICATIONS  (STANDING):  artificial tears (preservative free) Ophthalmic Solution 1 Drop(s) Both EYES two times a day  aspirin enteric coated 81 milliGRAM(s) Oral daily  atorvastatin 40 milliGRAM(s) Oral at bedtime  docusate sodium Liquid 100 milliGRAM(s) Oral three times a day  famotidine Injectable 20 milliGRAM(s) IV Push every 24 hours  heparin  Injectable 5000 Unit(s) SubCutaneous every 8 hours  lisinopril 5 milliGRAM(s) Oral daily  senna 2 Tablet(s) Oral at bedtime  triamcinolone 0.025% Cream 1 Application(s) Topical three times a day            Lipid Profile: Cholesterol 156  LDL 85  HDL 53  TG 88    HgA1c: Hemoglobin A1C, Whole Blood: 5.4 % (10-31 @ 09:27)    TSH: Thyroid Stimulating Hormone, Serum: 1.93 uU/mL (10-31 @ 07:04)
CHIEF COMPLAINT:Patient is a 89y old  Female who presents with a chief complaint of left sided facial droop on morning of admission.Pt appears comfortable.    	  REVIEW OF SYSTEMS:  [x ] Unable to obtain    PHYSICAL EXAM:  T(C): 37.1 (11-01-18 @ 07:40), Max: 37.4 (10-31-18 @ 15:15)  HR: 94 (11-01-18 @ 07:40) (91 - 112)  BP: 95/50 (11-01-18 @ 07:40) (95/50 - 146/86)  RR: 18 (11-01-18 @ 07:40) (18 - 20)  SpO2: 93% (11-01-18 @ 07:40) (93% - 100%)  Wt(kg): --  I&O's Summary    31 Oct 2018 07:01  -  01 Nov 2018 07:00  --------------------------------------------------------  IN: 100 mL / OUT: 300 mL / NET: -200 mL        Appearance: Normal	  HEENT:   Normal oral mucosa, PERRL, EOMI	  Lymphatic: No lymphadenopathy  Cardiovascular: Normal S1 S2, No JVD, No murmurs, No edema  Respiratory: Lungs clear to auscultation	  Gastrointestinal:  Soft, Non-tender, + BS	  Skin: No rashes, No ecchymoses, No cyanosis	  Extremities: Normal range of motion, No clubbing, cyanosis or edema  Vascular: Peripheral pulses palpable 2+ bilaterally    MEDICATIONS  (STANDING):  artificial tears (preservative free) Ophthalmic Solution 1 Drop(s) Both EYES two times a day  aspirin enteric coated 81 milliGRAM(s) Oral daily  atorvastatin 40 milliGRAM(s) Oral at bedtime  docusate sodium Liquid 100 milliGRAM(s) Oral three times a day  famotidine Injectable 20 milliGRAM(s) IV Push every 24 hours  heparin  Injectable 5000 Unit(s) SubCutaneous every 8 hours  lisinopril 5 milliGRAM(s) Oral daily  senna 2 Tablet(s) Oral at bedtime  triamcinolone 0.025% Cream 1 Application(s) Topical three times a day      TELEMETRY: 	nsr,pvc's      	  LABS:	 	    CARDIAC MARKERS:  CARDIAC MARKERS ( 31 Oct 2018 01:03 )  0.017 ng/mL / x     / x     / x     / x      CARDIAC MARKERS ( 30 Oct 2018 19:08 )  0.021 ng/mL / x     / x     / x     / x      CARDIAC MARKERS ( 30 Oct 2018 11:32 )  0.015 ng/mL / x     / x     / x     / x                                    14.2   10.4  )-----------( 241      ( 31 Oct 2018 07:04 )             44.4     10-31    138  |  102  |  16  ----------------------------<  105<H>  3.9   |  30  |  0.55    Ca    9.7      31 Oct 2018 07:04  Phos  3.6     10-31  Mg     2.3     10-31    TPro  7.1  /  Alb  2.6<L>  /  TBili  0.4  /  DBili  x   /  AST  16  /  ALT  15  /  AlkPhos  57  10-30      Lipid Profile: Cholesterol 156  LDL 85  HDL 53  TG 88    HgA1c: Hemoglobin A1C, Whole Blood: 5.4 % (10-31 @ 09:27)    TSH: Thyroid Stimulating Hormone, Serum: 1.93 uU/mL (10-31 @ 07:04)
MEDICATIONS  (STANDING):  artificial tears (preservative free) Ophthalmic Solution 1 Drop(s) Both EYES two times a day  aspirin enteric coated 81 milliGRAM(s) Oral daily  atorvastatin 40 milliGRAM(s) Oral at bedtime  docusate sodium Liquid 100 milliGRAM(s) Oral three times a day  famotidine Injectable 20 milliGRAM(s) IV Push every 24 hours  heparin  Injectable 5000 Unit(s) SubCutaneous every 8 hours  lisinopril 5 milliGRAM(s) Oral daily  senna 2 Tablet(s) Oral at bedtime  triamcinolone 0.025% Cream 1 Application(s) Topical three times a day    MEDICATIONS  (PRN):  acetaminophen   Tablet .. 650 milliGRAM(s) Oral every 4 hours PRN Moderate Pain (4 - 6)  sodium biphosphate Rectal Enema 1 Enema Rectal once PRN constipation        Allergies:  Allergies    Allergy Status Unknown    Intolerances        ROS  [ x ] UNABLE TO ELICIT    General:  [  ] None  [  ] Fever  [  ] Chills  [  ] Malaise    Skin:  [  ] None [  ] Rash  [  ] Wound  [  ] Ulcer    HEENT:  [  ] None  [  ] Sore Throat  [  ] Nasal congestion/ runny nose  [  ] Photophobia [  ] Neck pain      Chest:  [  ] None   [  ] SOB  [  ] Cough  [  ] None    Cardiovascular:   [  ] None  [  ] CP  [  ] Palpitation    Gastrointestinal:  [  ] None  [  ] Abd pain   [  ] Nausea    [  ] Vomiting   [  ] Diarrhea	     Genitourinary:  [  ] None [  ] Polyuria   [  ] Urgency  [  ] Frequency  [  ] Dysuria    [  ]  Hematuria       Musculoskeletal:  [  ] None [  ] Back Pain	[  ] Body aches  [  ] Joint pain    Neurological: [  ] None [  ]Dizziness  [  ]Visual Disturbance  [  ]Headaches   [  ] Weakness          PHYSICAL EXAM:  Vital Signs Last 24 Hrs  T(C): 36.3 (02 Nov 2018 07:27), Max: 37.9 (01 Nov 2018 15:15)  T(F): 97.4 (02 Nov 2018 07:27), Max: 100.2 (01 Nov 2018 15:15)  HR: 87 (02 Nov 2018 07:27) (87 - 108)  BP: 116/63 (02 Nov 2018 07:27) (112/69 - 120/65)  BP(mean): --  RR: 18 (02 Nov 2018 07:27) (17 - 18)  SpO2: 94% (02 Nov 2018 07:27) (93% - 100%)      HEENT: [  ] Wnl  [ x ] Left side facial droop    Neck:  [ x ] Supple  [  ]Lymphadenopathy  [  ] No JVD   [  ] JVD  [  ] Masses   [  ] WNL    CHEST/Respiratory:  [ x ]Clear to auscultation  [  ] Rales   [  ] Rhonchi   [  ] Wheezing     [  ] Chest Tenderness      Cardiovascular:  [ x ] Reg S1 S2   [  ] Irreg S1 S2   [ x ]No Murmur  [  ] +ve Murmurs  [  ]Systolic [  ]Diastolic      Abdomen:  [ x ] Soft  [ x ] No tendrerness  [  ] Tenderness  [  ] Organomegaly  [  ] ABD Distention  [  ] Rigidity                       [ x ] No Regidity                       [ x ] No Rebound Tenderness  [  ] No Guarding Rigidity  [  ] Rebound Tenderness[  ] Guarding Rigidity                          [ x ]  +ve Bowel Sounds  [  ] Decreased Bowel Sounds    [  ] Absent Bowel Sounds                            Extremities: [ x ] No edema [  ] Edema  [  ] Clubbing   [  ] Cyanosis                         [ x ] No Tender Calf muscles  [  ] Tender Calf muscles                        [ x ] Palpable peripheral pulses    Neurological: [  ] Awake  [  ] Alert  [ x ] Oriented  x  1                           [  ] Confused  [ x ] Drowzy  [  ] repond to painful stimuli  [  ] Unresponsive    Skin:  [ x ] Intact [  ] Redness [  ] Thrombophlebitis  [  ] Rashes  [  ] Dry  [  ] Ulcers    Ortho:  [  ] Joint Swelling  [  ] Joint erythema [  ] Joint tenderness                [  ] Increased temp. to touch  [  ] DJD [ x ] Contracted right hand.        LABS/DIAGNOSTIC TESTS                          14.2   10.4  )-----------( 241      ( 31 Oct 2018 07:04 )             44.4         10-31    138  |  102  |  16  ----------------------------<  105<H>  3.9   |  30  |  0.55    Ca    9.7      31 Oct 2018 07:04  Phos  3.6     10-31  Mg     2.3     10-31    TPro  7.1  /  Alb  2.6<L>  /  TBili  0.4  /  DBili  x   /  AST  16  /  ALT  15  /  AlkPhos  57  10-30      LIVER FUNCTIONS - ( 30 Oct 2018 11:32 )  Alb: 2.6 g/dL / Pro: 7.1 g/dL / ALK PHOS: 57 U/L / ALT: 15 U/L DA / AST: 16 U/L / GGT: x               CULTURES:   None.    RADIOLOGY    EXAM:  CT ANGIO NECK (W)AW IC                          EXAM:  CT ANGIO BRAIN (W)AW IC                            PROCEDURE DATE:  10/30/2018          INTERPRETATION:  CT ANGIOGRAPHY OF THE HEAD AND NECK  NONCONTRAST CT HEAD    HISTORY: 89 years old. Female. CVA.    COMPARISON: Noncontrast CT head 10/3/2018 11:09 AM.    TECHNIQUE:   1. Noncontrast CT head was performed.  2. CT angiographic evaluation of the head and neck was performed   consisting of contiguous axial sections scanned from the vertex to the   level of the aortic arch following the intravenous administration of   iodinated contrast. The acquired data was post-processed to generate   3D/MIP coronal, and sagittal reformats of the head and neck arterial   vasculature. Vascular stenosis is measured by NASCET criteria comparing   the narrowest segment with the distal luminal diameter as related to the   reported measure of arterial narrowing.    FINDINGS:    CT head:  There is an acute to subacute infarct in the right basal ganglia and   corona radiata. There appears to be involvement of the right posterior   insula. Mild mass effect without midline shift. No acute intracranial   hemorrhage.    The ventricles and cortical sulci are prominent reflecting parenchymal   volume loss.  Scattered hypodensities in the white matter are nonspecific, but likely   sequela small vessel ischemic disease.    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    Small mucus retention cyst/polyp in the left frontal sinus. And   opacification of the bilateral posterior mastoid air cells. The native   ocular lenses are surgically absent. Hyperostosis frontalis interna.    CTA head and neck:  Conventional arch anatomy. Great vessel origins are patent. Innominate   and visualized subclavian arteries are patent.    Anterior circulation:  Moderate right and mild left calcified and noncalcified atherosclerotic   plaque along the bilateral carotid bifurcations, without contributing to   significant stenosis. The cervical right internal carotid artery is   tortuous; the cervical internal carotid arteries are otherwise patent   without significant stenosis. Atherosclerotic calcifications along the   bilateral cavernous and supraclinoid internal carotid artery segments  without significant stenosis.    The anterior and middle cerebral arteries show normal caliber and   branching pattern.     Posterior circulation:  Bilateral vertebral arteries are patent from origins to intracranial   segments, with scattered atherosclerotic plaque along the bilateral V4   without causing hemodynamically significant stenosis. The basilar artery   is unremarkable. The posterior cerebral arteries are normal in caliber   and arise from the basilar tip.     Other:  No enlarged cervical adenopathy by size criteria.    The visualized lung apices are clear.    Multilevel degenerative changes of the cervical spine are noted.    IMPRESSION:  Patent cervical and intracranial vessels without evidence of abrupt   vessel cut off, hemodynamically significant stenosis, aneurysm, or   dissection.    Noncontrast CT head demonstrates an acute to subacute infarct in the   right corona radiata, with involvement of the right posterior insula.          EXAM:  XR CHEST PORTABLE IMMED 1V                            PROCEDURE DATE:  10/30/2018          INTERPRETATION:  Altered mental status.    AP chest.    Heart size not accurately evaluated on this AP film. Atherosclerotic   aorta. Very low lung volumes. Grossly clear lungs. Bilateral calcified   hilar lymph nodes. No consolidation or effusion. Moderate thoracic   dextroscoliosis.    Impression: Grossly clear lungs.      Assessment and Plan:   89 y/o F with a PMHx of CVA, HTN, COPD and no PSHx presents to ED by nursing home due to left sided facial droop x 9 am this morning. Pt at baseline is ao x1, bedbound with contractors on right arm, pt is also poor functioning at baseline.  CT of the had showed an acute to subacute infarct in the right corona radiata, with involvement of the right posterior insula.  11/2/18 Patient is more awake and responsive.    Problem/Plan - 1:  ·  Problem: CVA (cerebral vascular accident).    Plan:   1- Telemetry monitoring as per cardiology.  2- Neurology follow up as needed.  3- Aspiration precautions.  4- ASA as per neurology.  5- Comfort care, and hospice if family agrees.     Problem/Plan - 2:  ·  Problem: HTN (hypertension).    Plan:   1- Carvedilol with parameters.  2- Continue to monitor BP.   3- Cardiology management and follow up.     Problem/Plan - 3:  ·  Problem: COPD (chronic obstructive pulmonary disease).    Plan:   1- Bronchodilators.  2- O2 as needed.  3- Observe for any respiratory symptoms.     Discussed with medical resident.
MEDICATIONS  (STANDING):  artificial tears (preservative free) Ophthalmic Solution 1 Drop(s) Both EYES two times a day  aspirin enteric coated 81 milliGRAM(s) Oral daily  atorvastatin 40 milliGRAM(s) Oral at bedtime  docusate sodium Liquid 100 milliGRAM(s) Oral three times a day  famotidine Injectable 20 milliGRAM(s) IV Push every 24 hours  heparin  Injectable 5000 Unit(s) SubCutaneous every 8 hours  lisinopril 5 milliGRAM(s) Oral daily  senna 2 Tablet(s) Oral at bedtime  triamcinolone 0.025% Cream 1 Application(s) Topical three times a day    MEDICATIONS  (PRN):  acetaminophen   Tablet .. 650 milliGRAM(s) Oral every 4 hours PRN Moderate Pain (4 - 6)  sodium biphosphate Rectal Enema 1 Enema Rectal once PRN constipation      Allergies:  Allergies    Allergy Status Unknown    Intolerances        ROS  [ x ] UNABLE TO ELICIT    General:  [  ] None  [  ] Fever  [  ] Chills  [  ] Malaise    Skin:  [  ] None [  ] Rash  [  ] Wound  [  ] Ulcer    HEENT:  [  ] None  [  ] Sore Throat  [  ] Nasal congestion/ runny nose  [  ] Photophobia [  ] Neck pain      Chest:  [  ] None   [  ] SOB  [  ] Cough  [  ] None    Cardiovascular:   [  ] None  [  ] CP  [  ] Palpitation    Gastrointestinal:  [  ] None  [  ] Abd pain   [  ] Nausea    [  ] Vomiting   [  ] Diarrhea	     Genitourinary:  [  ] None [  ] Polyuria   [  ] Urgency  [  ] Frequency  [  ] Dysuria    [  ]  Hematuria       Musculoskeletal:  [  ] None [  ] Back Pain	[  ] Body aches  [  ] Joint pain    Neurological: [  ] None [  ]Dizziness  [  ]Visual Disturbance  [  ]Headaches   [  ] Weakness          PHYSICAL EXAM:    Vital Signs Last 24 Hrs  T(C): 37.1 (01 Nov 2018 07:40), Max: 37.4 (31 Oct 2018 15:15)  T(F): 98.8 (01 Nov 2018 07:40), Max: 99.4 (31 Oct 2018 15:15)  HR: 94 (01 Nov 2018 07:40) (91 - 112)  BP: 95/50 (01 Nov 2018 07:40) (95/50 - 146/86)  BP(mean): --  RR: 18 (01 Nov 2018 07:40) (18 - 20)  SpO2: 93% (01 Nov 2018 07:40) (93% - 100%)      HEENT: [  ] Wnl  [ x ] Left side facial droop    Neck:  [ x ] Supple  [  ]Lymphadenopathy  [  ] No JVD   [  ] JVD  [  ] Masses   [  ] WNL    CHEST/Respiratory:  [ x ]Clear to auscultation  [  ] Rales   [  ] Rhonchi   [  ] Wheezing     [  ] Chest Tenderness      Cardiovascular:  [ x ] Reg S1 S2   [  ] Irreg S1 S2   [ x ]No Murmur  [  ] +ve Murmurs  [  ]Systolic [  ]Diastolic      Abdomen:  [ x ] Soft  [ x ] No tendrerness  [  ] Tenderness  [  ] Organomegaly  [  ] ABD Distention  [  ] Rigidity                       [ x ] No Regidity                       [ x ] No Rebound Tenderness  [  ] No Guarding Rigidity  [  ] Rebound Tenderness[  ] Guarding Rigidity                          [ x ]  +ve Bowel Sounds  [  ] Decreased Bowel Sounds    [  ] Absent Bowel Sounds                            Extremities: [ x ] No edema [  ] Edema  [  ] Clubbing   [  ] Cyanosis                         [ x ] No Tender Calf muscles  [  ] Tender Calf muscles                        [ x ] Palpable peripheral pulses    Neurological: [  ] Awake  [  ] Alert  [ x ] Oriented  x  1                           [  ] Confused  [ x ] Drowzy  [  ] repond to painful stimuli  [  ] Unresponsive    Skin:  [ x ] Intact [  ] Redness [  ] Thrombophlebitis  [  ] Rashes  [  ] Dry  [  ] Ulcers    Ortho:  [  ] Joint Swelling  [  ] Joint erythema [  ] Joint tenderness                [  ] Increased temp. to touch  [  ] DJD [ x ] Contracted right hand.        LABS/DIAGNOSTIC TESTS                          14.2   10.4  )-----------( 241      ( 31 Oct 2018 07:04 )             44.4         10-31    138  |  102  |  16  ----------------------------<  105<H>  3.9   |  30  |  0.55    Ca    9.7      31 Oct 2018 07:04  Phos  3.6     10-31  Mg     2.3     10-31    TPro  7.1  /  Alb  2.6<L>  /  TBili  0.4  /  DBili  x   /  AST  16  /  ALT  15  /  AlkPhos  57  10-30      LIVER FUNCTIONS - ( 30 Oct 2018 11:32 )  Alb: 2.6 g/dL / Pro: 7.1 g/dL / ALK PHOS: 57 U/L / ALT: 15 U/L DA / AST: 16 U/L / GGT: x               CULTURES:   None.    RADIOLOGY    EXAM:  CT ANGIO NECK (W)AW IC                          EXAM:  CT ANGIO BRAIN (W)AW IC                            PROCEDURE DATE:  10/30/2018          INTERPRETATION:  CT ANGIOGRAPHY OF THE HEAD AND NECK  NONCONTRAST CT HEAD    HISTORY: 89 years old. Female. CVA.    COMPARISON: Noncontrast CT head 10/3/2018 11:09 AM.    TECHNIQUE:   1. Noncontrast CT head was performed.  2. CT angiographic evaluation of the head and neck was performed   consisting of contiguous axial sections scanned from the vertex to the   level of the aortic arch following the intravenous administration of   iodinated contrast. The acquired data was post-processed to generate   3D/MIP coronal, and sagittal reformats of the head and neck arterial   vasculature. Vascular stenosis is measured by NASCET criteria comparing   the narrowest segment with the distal luminal diameter as related to the   reported measure of arterial narrowing.    FINDINGS:    CT head:  There is an acute to subacute infarct in the right basal ganglia and   corona radiata. There appears to be involvement of the right posterior   insula. Mild mass effect without midline shift. No acute intracranial   hemorrhage.    The ventricles and cortical sulci are prominent reflecting parenchymal   volume loss.  Scattered hypodensities in the white matter are nonspecific, but likely   sequela small vessel ischemic disease.    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    Small mucus retention cyst/polyp in the left frontal sinus. And   opacification of the bilateral posterior mastoid air cells. The native   ocular lenses are surgically absent. Hyperostosis frontalis interna.    CTA head and neck:  Conventional arch anatomy. Great vessel origins are patent. Innominate   and visualized subclavian arteries are patent.    Anterior circulation:  Moderate right and mild left calcified and noncalcified atherosclerotic   plaque along the bilateral carotid bifurcations, without contributing to   significant stenosis. The cervical right internal carotid artery is   tortuous; the cervical internal carotid arteries are otherwise patent   without significant stenosis. Atherosclerotic calcifications along the   bilateral cavernous and supraclinoid internal carotid artery segments  without significant stenosis.    The anterior and middle cerebral arteries show normal caliber and   branching pattern.     Posterior circulation:  Bilateral vertebral arteries are patent from origins to intracranial   segments, with scattered atherosclerotic plaque along the bilateral V4   without causing hemodynamically significant stenosis. The basilar artery   is unremarkable. The posterior cerebral arteries are normal in caliber   and arise from the basilar tip.     Other:  No enlarged cervical adenopathy by size criteria.    The visualized lung apices are clear.    Multilevel degenerative changes of the cervical spine are noted.    IMPRESSION:  Patent cervical and intracranial vessels without evidence of abrupt   vessel cut off, hemodynamically significant stenosis, aneurysm, or   dissection.    Noncontrast CT head demonstrates an acute to subacute infarct in the   right corona radiata, with involvement of the right posterior insula.          EXAM:  XR CHEST PORTABLE IMMED 1V                            PROCEDURE DATE:  10/30/2018          INTERPRETATION:  Altered mental status.    AP chest.    Heart size not accurately evaluated on this AP film. Atherosclerotic   aorta. Very low lung volumes. Grossly clear lungs. Bilateral calcified   hilar lymph nodes. No consolidation or effusion. Moderate thoracic   dextroscoliosis.    Impression: Grossly clear lungs.      Assessment and Plan:   89 y/o F with a PMHx of CVA, HTN, COPD and no PSHx presents to ED by nursing home due to left sided facial droop x 9 am this morning. Pt at baseline is ao x1, bedbound with contractors on right arm, pt is also poor functioning at baseline.  CT of the had showed an acute to subacute infarct in the right corona radiata, with involvement of the right posterior insula.    Problem/Plan - 1:  ·  Problem: CVA (cerebral vascular accident).    Plan:   1- Telemetry monitoring as per cardiology.  2- Neurology management and follow up.  3- Aspiration precautions.  4- ASA as per neurology.  5- Comfort care evaluation.     Problem/Plan - 2:  ·  Problem: HTN (hypertension).    Plan:   1- Carvedilol with parameters.  2- Continue to monitor BP.   3- Cardiology management and follow up.     Problem/Plan - 3:  ·  Problem: COPD (chronic obstructive pulmonary disease).    Plan:   1- Bronchodilators.  2- O2 as needed.  3- Observe for any respiratory symptoms.     Discussed with medical resident.
Neurology Follow up note    Name  NANI BUSBY      Subjective:  no clinical change  had TTE    Review of Systems:  Constitutional:      no fever   Respiratory:        no cough                      MEDICATIONS  (STANDING):  artificial tears (preservative free) Ophthalmic Solution 1 Drop(s) Both EYES two times a day  aspirin enteric coated 81 milliGRAM(s) Oral daily  atorvastatin 40 milliGRAM(s) Oral at bedtime  dextrose 5% 1000 milliLiter(s) (50 mL/Hr) IV Continuous <Continuous>  docusate sodium Liquid 100 milliGRAM(s) Oral three times a day  famotidine Injectable 20 milliGRAM(s) IV Push every 24 hours  heparin  Injectable 5000 Unit(s) SubCutaneous every 8 hours  lisinopril 5 milliGRAM(s) Oral daily  senna 2 Tablet(s) Oral at bedtime  triamcinolone 0.025% Cream 1 Application(s) Topical three times a day    MEDICATIONS  (PRN):  acetaminophen   Tablet .. 650 milliGRAM(s) Oral every 4 hours PRN Moderate Pain (4 - 6)  sodium biphosphate Rectal Enema 1 Enema Rectal once PRN constipation      Allergies    Allergy Status Unknown    Intolerances        Objective:   Vital Signs Last 24 Hrs  T(C): 37.1 (02 Nov 2018 15:38), Max: 37.1 (01 Nov 2018 19:37)  T(F): 98.7 (02 Nov 2018 15:38), Max: 98.8 (01 Nov 2018 19:37)  HR: 97 (02 Nov 2018 15:38) (87 - 108)  BP: 114/94 (02 Nov 2018 15:38) (112/69 - 129/62)  BP(mean): --  RR: 19 (02 Nov 2018 15:38) (17 - 19)  SpO2: 95% (02 Nov 2018 15:38) (93% - 100%)    General Exam:   General appearance: No acute distress                 Cardiovascular: Pedal dorsalis pulses intact bilaterally    Mental Status: Orientated to self but  not to date and place.  Attention visually intact.  + dysarthria and brocca's aphasia.      Cranial Nerves: CN I - not tested.  PERRL, EOMI grossly, blinks to threat bl temporal areas. Fundi not visualized.  CN V1-3 intact to light touch grossly.  Left facial weakness (2).     Motor:   Tone: decreased left and increased right.                  Strength: right arm 3, left arm 3, right leg 2, left leg 2        dysmetria on finger-nose-finger or heel-shin-heel unreliable due to weakness    Sensation: decrease on left to light touch, pinprick    Gait: bedbound    Other: NIHSS 18  MRS 5                    Radiology    < from: Transthoracic Echocardiogram (11.01.18 @ 07:13) >  CONCLUSIONS:  TECHNICALLY VERY DIFFICULT STUDY, POOR ACOUSTIC WINDOWS    1. Mitral annular calcification, and calcified mitral  leaflets with normal diastolic opening. Trace mitral  regurgitation.  2.  Mild aortic regurgitation.  3. Normal left atrium.  LA volume index = 25 cc/m2. The  intra-atrial septum appears hypermobile and maybe  aneurysmal.  4. Increased relative wall thickness with normal left  ventricular (LV) mass index, consistent with concentric LV  remodeling.  5. Endocardium not well visualized; grossly normal left  ventricular systolic function. Segmental wall motion could  not be assessed.  6. Normal right ventricular size and function.    < end of copied text >
PGY 1 Note discussed with supervising resident and primary attending    Patient is a 89y old  Female who presents with a chief complaint of left sided facial droop on morning of admission (31 Oct 2018 09:43)      INTERVAL HPI/OVERNIGHT EVENTS:   Patient seen at the bedside. No over night event s    MEDICATIONS  (STANDING):  artificial tears (preservative free) Ophthalmic Solution 1 Drop(s) Both EYES two times a day  aspirin enteric coated 81 milliGRAM(s) Oral daily  atorvastatin 40 milliGRAM(s) Oral at bedtime  docusate sodium Liquid 100 milliGRAM(s) Oral three times a day  famotidine Injectable 20 milliGRAM(s) IV Push every 24 hours  heparin  Injectable 5000 Unit(s) SubCutaneous every 8 hours  lisinopril 5 milliGRAM(s) Oral daily  senna 2 Tablet(s) Oral at bedtime  triamcinolone 0.025% Cream 1 Application(s) Topical three times a day    MEDICATIONS  (PRN):  acetaminophen   Tablet .. 650 milliGRAM(s) Oral every 4 hours PRN Moderate Pain (4 - 6)  sodium biphosphate Rectal Enema 1 Enema Rectal once PRN constipation      __________________________________________________  REVIEW OF SYSTEMS:    unable to review as patient is AAOx1 at baseline    Vital Signs Last 24 Hrs  T(C): 37.4 (31 Oct 2018 15:15), Max: 37.4 (31 Oct 2018 15:15)  T(F): 99.4 (31 Oct 2018 15:15), Max: 99.4 (31 Oct 2018 15:15)  HR: 94 (31 Oct 2018 15:15) (84 - 94)  BP: 114/86 (31 Oct 2018 15:15) (96/37 - 150/90)  BP(mean): --  RR: 18 (31 Oct 2018 15:15) (18 - 20)  SpO2: 96% (31 Oct 2018 15:15) (96% - 100%)    ________________________________________________  PHYSICAL EXAM:  GENERAL: NAD  HEENT: Normocephalic;  conjunctivae and sclerae clear; moist mucous membranes;   NECK : supple  CHEST/LUNG: Clear to auscultation bilaterally with good air entry   HEART: S1 S2  regular; no murmurs, gallops or rubs  ABDOMEN: Soft, Nontender, Nondistended; Bowel sounds present  EXTREMITIES: no cyanosis; no edema; no calf tenderness  SKIN: warm and dry; no rash  NERVOUS SYSTEM:  bedbound, AAOx 1 at baseline, non verbal     _________________________________________________  LABS:                        14.2   10.4  )-----------( 241      ( 31 Oct 2018 07:04 )             44.4     10-31    138  |  102  |  16  ----------------------------<  105<H>  3.9   |  30  |  0.55    Ca    9.7      31 Oct 2018 07:04  Phos  3.6     10-31  Mg     2.3     10-31    TPro  7.1  /  Alb  2.6<L>  /  TBili  0.4  /  DBili  x   /  AST  16  /  ALT  15  /  AlkPhos  57  1030      Urinalysis Basic - ( 30 Oct 2018 14:12 )    Color: Yellow / Appearance: Clear / S.010 / pH: x  Gluc: x / Ketone: Negative  / Bili: Negative / Urobili: Negative   Blood: x / Protein: 30 mg/dL / Nitrite: Negative   Leuk Esterase: Moderate / RBC: 0-2 /HPF / WBC 26-50 /HPF   Sq Epi: x / Non Sq Epi: x / Bacteria: Trace /HPF      CAPILLARY BLOOD GLUCOSE            RADIOLOGY & ADDITIONAL TESTS:    Imaging Personally Reviewed:  YES/NO    Consultant(s) Notes Reviewed:   YES/ No    Care Discussed with Consultants :     Plan of care was discussed with patient and /or primary care giver; all questions and concerns were addressed and care was aligned with patient's wishes.

## 2018-11-02 NOTE — DISCHARGE NOTE ADULT - NS AS DC STROKE ED MATERIALS
Stroke Education Booklet/Stroke Warning Signs and Symptoms/Call 911 for Stroke/Risk Factors for Stroke/Prescribed Medications/Need for Followup After Discharge
Stroke Education Booklet/Stroke Warning Signs and Symptoms/Call 911 for Stroke/Risk Factors for Stroke/Prescribed Medications/Need for Followup After Discharge

## 2018-11-02 NOTE — PROGRESS NOTE ADULT - ASSESSMENT
Patient admitted for acute stroke and further management      Patient is DNR/DNI, family wants Long term with hospice
Patient admitted for acute stroke and further management      Patient is DNR/DNI, family wants to discuss about hospice vs going back to NH
89 y/o F with a PMHx of CVA, HTN, COPD and no PSHx presents to ED by nursing home due to left sided facial droop, RT MCA stroke.  1.D/C Tele monitoring.  2.As per Palliative family wants pt to go to facility with hospice.  3.Neurology eval noted.  4.AC as per neurology.  5.HTN-Ace.  6.Cont asa,statin.  7.COPD-stable.  8.GI and DVT prophylaxis.
89 y/o F with a PMHx of CVA, HTN, COPD and no PSHx presents to ED by nursing home due to left sided facial droop x 9 am this morning. Pt at baseline is ao x1, bedbound with contractors on right arm, pt is also poor functioning at baseline.  CT of the had showed an acute to subacute infarct in the right corona radiata, with involvement of the right posterior insula.
89 y/o F with a PMHx of CVA, HTN, COPD and no PSHx presents to ED by nursing home due to left sided facial droop, RT MCA stroke.  1.Tele monitoring.  2.As per Palliative family wants pt to go to facility with hospice.  3.Neurology eval noted.  4.AC as per neurology.  5.HTN-Ace.  6.Cont asa,statin.  7.COPD-stable.  8.GI and DVT prophylaxis.
Acute ischemic stroke    Recommend:  asa81, lipitor 40  neuro fu in 2 weeks
Patient admitted for acute stroke and further management      Patient has MOLST form in chart indicating she is FULL CODE with trial of intubation

## 2018-11-02 NOTE — DISCHARGE NOTE ADULT - HOSPITAL COURSE
89 y/o F with a PMHx of CVA, HTN, COPD and no PSHx presents to ED by nursing home due to left sided facial droop x 9 am this morning. Pt at baseline is ao x1, bedbound with contractors on right arm, pt is also poor functioning at baseline. Pt takes Aspirin, no other blood thinners. Patient is more oriented at time of my examination, and is denying chest pain, shortness of breath, all other symptoms negative except for weakness.     Patient was diagnosed to have stroke in right MCA territory and right putamen and recommended to take aspirin and Lipitor. Family refused to proceed with aggressive measure such as YEIMI, as patient is not a candidate for AC. Family agreed for hospice       Patient is medically stable to be  discharged , Case is discussed with the attending

## 2018-11-02 NOTE — DISCHARGE NOTE ADULT - CARE PLAN
Principal Discharge DX:	Cerebrovascular accident (CVA), unspecified mechanism  Goal:	comfort care  Assessment and plan of treatment:	You had stroke. CT showed stroke in right MCA territory and right putamen. You are recommended to take aspirin and Lipitor once a day. YOu agreed to proceed with hospice.  Secondary Diagnosis:	HTN (hypertension)  Assessment and plan of treatment:	You are recommended to take Lisinopril once  a day. You agreed to proceed with hospice.  Secondary Diagnosis:	Palliative care encounter  Assessment and plan of treatment:	YOu agreed to proceed with hospice.

## 2018-11-02 NOTE — PROGRESS NOTE ADULT - PROBLEM SELECTOR PLAN 1
c/w aspirin , lipitor   CT head: new stroke  Speech and swallow: puree diet  ECHO pending
c/w aspirin , lipitor   CT head: new stroke  Speech and swallow: puree diet  ECHO pending
1- Telemetry monitoring.  2- Neurology management and follow up.  3- Aspiration precautions.  4- ASA as per neurology.  5- Comfort care evaluation.
c/w aspirin , lipitor   CT head: new stroke  Speech and swallow: puree diet  ECHO pending

## 2018-11-02 NOTE — DISCHARGE NOTE ADULT - MEDICATION SUMMARY - MEDICATIONS TO TAKE
I will START or STAY ON the medications listed below when I get home from the hospital:    Mobic 7.5 mg oral tablet  -- 1 tab(s) by mouth once a day  -- Indication: For Pain     aspirin 81 mg oral delayed release tablet  -- 1 tab(s) by mouth once a day  -- Indication: For Pain     lisinopril 5 mg oral tablet  -- 1 tab(s) by mouth once a day  -- Indication: For HTN (hypertension)    Maalox Antacid Antigas Regular Strength oral suspension  -- 10 milliliter(s) by mouth 4 times a day (after meals and at bedtime)  -- Indication: For antacid    atorvastatin 40 mg oral tablet  -- 1 tab(s) by mouth once a day (at bedtime)  -- Indication: For Hyperlipidemia     nystatin 100,000 units/g topical ointment  -- Apply on skin to affected area 3 times a day to groin  -- Indication: For topical     sulfacetamide sodium-sulfur 10%-5% topical lotion  -- Indication: For topical     triamcinolone 0.025% topical cream  -- Apply on skin to affected area 3 times a day on buttocks, inner thigh, and back  -- Indication: For topical     famotidine 10 mg/mL intravenous solution  -- 2 milliliter(s) intravenous every 24 hours  -- Indication: For Gi agent     docusate sodium 10 mg/mL oral liquid  -- 10 milliliter(s) by mouth 3 times a day  -- Indication: For Laxative     lactulose 10 g/15 mL oral solution  -- 30 milliliter(s) by mouth once a day  -- Indication: For laxative     Fleet Enema 7 g-19 g rectal enema  -- 133 milliliter(s) rectally once, As Needed constipation  -- Indication: For laxative     Senna 8.6 mg oral tablet  -- 2 tab(s) by mouth 2 times a day  -- Indication: For laxative     Refresh Dry Eye Therapy ophthalmic solution  -- 1 drop(s) to each affected eye 2 times a day  -- Indication: For eye drops

## 2018-11-02 NOTE — PROGRESS NOTE ADULT - PROVIDER SPECIALTY LIST ADULT
Cardiology
Cardiology
Internal Medicine
Neurology
Internal Medicine

## 2018-11-02 NOTE — PROGRESS NOTE ADULT - PROBLEM SELECTOR PROBLEM 2
COPD (chronic obstructive pulmonary disease)
HTN (hypertension)

## 2018-11-02 NOTE — DISCHARGE NOTE ADULT - PLAN OF CARE
comfort care You had stroke. CT showed stroke in right MCA territory and right putamen. You are recommended to take aspirin and Lipitor once a day. YOu agreed to proceed with hospice. You are recommended to take Lisinopril once  a day. You agreed to proceed with hospice. YOu agreed to proceed with hospice.

## 2018-11-05 ENCOUNTER — INPATIENT (INPATIENT)
Facility: HOSPITAL | Age: 83
LOS: 3 days | Discharge: NURSING, REHAB OR LONG-TERM CARE | DRG: 391 | End: 2018-11-09
Attending: INTERNAL MEDICINE | Admitting: INTERNAL MEDICINE
Payer: MEDICARE

## 2018-11-05 VITALS
RESPIRATION RATE: 18 BRPM | DIASTOLIC BLOOD PRESSURE: 74 MMHG | OXYGEN SATURATION: 97 % | TEMPERATURE: 98 F | SYSTOLIC BLOOD PRESSURE: 148 MMHG | HEART RATE: 78 BPM

## 2018-11-05 DIAGNOSIS — R13.10 DYSPHAGIA, UNSPECIFIED: ICD-10-CM

## 2018-11-05 DIAGNOSIS — N39.0 URINARY TRACT INFECTION, SITE NOT SPECIFIED: ICD-10-CM

## 2018-11-05 DIAGNOSIS — I63.9 CEREBRAL INFARCTION, UNSPECIFIED: ICD-10-CM

## 2018-11-05 DIAGNOSIS — Z29.9 ENCOUNTER FOR PROPHYLACTIC MEASURES, UNSPECIFIED: ICD-10-CM

## 2018-11-05 DIAGNOSIS — I10 ESSENTIAL (PRIMARY) HYPERTENSION: ICD-10-CM

## 2018-11-05 DIAGNOSIS — J44.9 CHRONIC OBSTRUCTIVE PULMONARY DISEASE, UNSPECIFIED: ICD-10-CM

## 2018-11-05 DIAGNOSIS — Z98.49 CATARACT EXTRACTION STATUS, UNSPECIFIED EYE: Chronic | ICD-10-CM

## 2018-11-05 DIAGNOSIS — Z71.89 OTHER SPECIFIED COUNSELING: ICD-10-CM

## 2018-11-05 LAB
ACETONE SERPL-MCNC: NEGATIVE — SIGNIFICANT CHANGE UP
ALBUMIN SERPL ELPH-MCNC: 2.5 G/DL — LOW (ref 3.5–5)
ALP SERPL-CCNC: 158 U/L — HIGH (ref 40–120)
ALT FLD-CCNC: 78 U/L DA — HIGH (ref 10–60)
ANION GAP SERPL CALC-SCNC: 5 MMOL/L — SIGNIFICANT CHANGE UP (ref 5–17)
APPEARANCE UR: ABNORMAL
APTT BLD: 27.1 SEC — LOW (ref 27.5–36.3)
AST SERPL-CCNC: 147 U/L — HIGH (ref 10–40)
BACTERIA # UR AUTO: ABNORMAL /HPF
BASOPHILS # BLD AUTO: 0 K/UL — SIGNIFICANT CHANGE UP (ref 0–0.2)
BASOPHILS NFR BLD AUTO: 0.6 % — SIGNIFICANT CHANGE UP (ref 0–2)
BILIRUB SERPL-MCNC: 1.1 MG/DL — SIGNIFICANT CHANGE UP (ref 0.2–1.2)
BILIRUB UR-MCNC: NEGATIVE — SIGNIFICANT CHANGE UP
BUN SERPL-MCNC: 14 MG/DL — SIGNIFICANT CHANGE UP (ref 7–18)
CALCIUM SERPL-MCNC: 9 MG/DL — SIGNIFICANT CHANGE UP (ref 8.4–10.5)
CHLORIDE SERPL-SCNC: 107 MMOL/L — SIGNIFICANT CHANGE UP (ref 96–108)
CO2 SERPL-SCNC: 29 MMOL/L — SIGNIFICANT CHANGE UP (ref 22–31)
COLOR SPEC: YELLOW — SIGNIFICANT CHANGE UP
COMMENT - URINE: SIGNIFICANT CHANGE UP
COMMENT - URINE: SIGNIFICANT CHANGE UP
CREAT SERPL-MCNC: 0.51 MG/DL — SIGNIFICANT CHANGE UP (ref 0.5–1.3)
DIFF PNL FLD: ABNORMAL
EOSINOPHIL # BLD AUTO: 0.9 K/UL — HIGH (ref 0–0.5)
EOSINOPHIL NFR BLD AUTO: 11.3 % — HIGH (ref 0–6)
EPI CELLS # UR: ABNORMAL /HPF
GLUCOSE SERPL-MCNC: 93 MG/DL — SIGNIFICANT CHANGE UP (ref 70–99)
GLUCOSE UR QL: NEGATIVE — SIGNIFICANT CHANGE UP
HCT VFR BLD CALC: 41.7 % — SIGNIFICANT CHANGE UP (ref 34.5–45)
HGB BLD-MCNC: 13.1 G/DL — SIGNIFICANT CHANGE UP (ref 11.5–15.5)
INR BLD: 1.17 RATIO — HIGH (ref 0.88–1.16)
KETONES UR-MCNC: NEGATIVE — SIGNIFICANT CHANGE UP
LEUKOCYTE ESTERASE UR-ACNC: ABNORMAL
LYMPHOCYTES # BLD AUTO: 0.8 K/UL — LOW (ref 1–3.3)
LYMPHOCYTES # BLD AUTO: 11.1 % — LOW (ref 13–44)
MAGNESIUM SERPL-MCNC: 2.1 MG/DL — SIGNIFICANT CHANGE UP (ref 1.6–2.6)
MCHC RBC-ENTMCNC: 27.1 PG — SIGNIFICANT CHANGE UP (ref 27–34)
MCHC RBC-ENTMCNC: 31.5 GM/DL — LOW (ref 32–36)
MCV RBC AUTO: 86.2 FL — SIGNIFICANT CHANGE UP (ref 80–100)
MONOCYTES # BLD AUTO: 0.7 K/UL — SIGNIFICANT CHANGE UP (ref 0–0.9)
MONOCYTES NFR BLD AUTO: 8.9 % — SIGNIFICANT CHANGE UP (ref 2–14)
NEUTROPHILS # BLD AUTO: 5.2 K/UL — SIGNIFICANT CHANGE UP (ref 1.8–7.4)
NEUTROPHILS NFR BLD AUTO: 68.1 % — SIGNIFICANT CHANGE UP (ref 43–77)
NITRITE UR-MCNC: POSITIVE
PH UR: 9 — HIGH (ref 5–8)
PLATELET # BLD AUTO: 245 K/UL — SIGNIFICANT CHANGE UP (ref 150–400)
POTASSIUM SERPL-MCNC: 3.7 MMOL/L — SIGNIFICANT CHANGE UP (ref 3.5–5.3)
POTASSIUM SERPL-SCNC: 3.7 MMOL/L — SIGNIFICANT CHANGE UP (ref 3.5–5.3)
PROT SERPL-MCNC: 6.8 G/DL — SIGNIFICANT CHANGE UP (ref 6–8.3)
PROT UR-MCNC: 30 MG/DL
PROTHROM AB SERPL-ACNC: 13 SEC — HIGH (ref 10–12.9)
RBC # BLD: 4.84 M/UL — SIGNIFICANT CHANGE UP (ref 3.8–5.2)
RBC # FLD: 13.9 % — SIGNIFICANT CHANGE UP (ref 10.3–14.5)
RBC CASTS # UR COMP ASSIST: ABNORMAL /HPF (ref 0–2)
SODIUM SERPL-SCNC: 141 MMOL/L — SIGNIFICANT CHANGE UP (ref 135–145)
SP GR SPEC: 1.01 — SIGNIFICANT CHANGE UP (ref 1.01–1.02)
UROBILINOGEN FLD QL: 4
WBC # BLD: 7.6 K/UL — SIGNIFICANT CHANGE UP (ref 3.8–10.5)
WBC # FLD AUTO: 7.6 K/UL — SIGNIFICANT CHANGE UP (ref 3.8–10.5)
WBC UR QL: SIGNIFICANT CHANGE UP /HPF (ref 0–5)

## 2018-11-05 PROCEDURE — 71045 X-RAY EXAM CHEST 1 VIEW: CPT | Mod: 26

## 2018-11-05 RX ORDER — CEFTRIAXONE 500 MG/1
1 INJECTION, POWDER, FOR SOLUTION INTRAMUSCULAR; INTRAVENOUS ONCE
Qty: 0 | Refills: 0 | Status: COMPLETED | OUTPATIENT
Start: 2018-11-05 | End: 2018-11-05

## 2018-11-05 RX ORDER — ASPIRIN/CALCIUM CARB/MAGNESIUM 324 MG
300 TABLET ORAL DAILY
Qty: 0 | Refills: 0 | Status: DISCONTINUED | OUTPATIENT
Start: 2018-11-05 | End: 2018-11-08

## 2018-11-05 RX ORDER — SODIUM CHLORIDE 9 MG/ML
1000 INJECTION INTRAMUSCULAR; INTRAVENOUS; SUBCUTANEOUS
Qty: 0 | Refills: 0 | Status: DISCONTINUED | OUTPATIENT
Start: 2018-11-05 | End: 2018-11-05

## 2018-11-05 RX ORDER — LEVETIRACETAM 250 MG/1
250 TABLET, FILM COATED ORAL EVERY 12 HOURS
Qty: 0 | Refills: 0 | Status: DISCONTINUED | OUTPATIENT
Start: 2018-11-05 | End: 2018-11-08

## 2018-11-05 RX ORDER — CEFTRIAXONE 500 MG/1
1 INJECTION, POWDER, FOR SOLUTION INTRAMUSCULAR; INTRAVENOUS EVERY 24 HOURS
Qty: 0 | Refills: 0 | Status: DISCONTINUED | OUTPATIENT
Start: 2018-11-06 | End: 2018-11-08

## 2018-11-05 RX ORDER — IPRATROPIUM/ALBUTEROL SULFATE 18-103MCG
3 AEROSOL WITH ADAPTER (GRAM) INHALATION EVERY 6 HOURS
Qty: 0 | Refills: 0 | Status: DISCONTINUED | OUTPATIENT
Start: 2018-11-05 | End: 2018-11-09

## 2018-11-05 RX ORDER — HEPARIN SODIUM 5000 [USP'U]/ML
5000 INJECTION INTRAVENOUS; SUBCUTANEOUS EVERY 12 HOURS
Qty: 0 | Refills: 0 | Status: DISCONTINUED | OUTPATIENT
Start: 2018-11-05 | End: 2018-11-09

## 2018-11-05 RX ADMIN — HEPARIN SODIUM 5000 UNIT(S): 5000 INJECTION INTRAVENOUS; SUBCUTANEOUS at 21:36

## 2018-11-05 RX ADMIN — Medication 1 DROP(S): at 21:37

## 2018-11-05 RX ADMIN — SODIUM CHLORIDE 125 MILLILITER(S): 9 INJECTION INTRAMUSCULAR; INTRAVENOUS; SUBCUTANEOUS at 16:43

## 2018-11-05 RX ADMIN — CEFTRIAXONE 100 GRAM(S): 500 INJECTION, POWDER, FOR SOLUTION INTRAMUSCULAR; INTRAVENOUS at 18:04

## 2018-11-05 RX ADMIN — Medication 3 MILLILITER(S): at 21:36

## 2018-11-05 NOTE — ED PROVIDER NOTE - OBJECTIVE STATEMENT
89 y/o F patient with a significant PMHx of COPD, CVA, HTN and a significant PSHx of cataract surgery presents with family to the ED with dysphagia. Patient was sent in from Nursing home due to lack of eating and lack of swallowing. Patient presents to the ED for a feeding tube placement. Patient's family states patient has not been complaining of any pain or fever. Patient's family member notes patient has been experiencing a constant cough along with chest congestion. Patient denies any other complaints. NKDA.

## 2018-11-05 NOTE — H&P ADULT - CLICK TO LAUNCH ORM
Health Maintenance Due   Topic Date Due    Lipid Panel  1955    TETANUS VACCINE  02/09/1973    Pap Smear with HPV Cotest  02/09/1976    Colonoscopy  02/09/2005    Influenza Vaccine  08/01/2017        .

## 2018-11-05 NOTE — H&P ADULT - ASSESSMENT
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 Family requesting PEG tube placement. [facility] R MCA CVA. Family acknowledges risks and benefits of PEG tube placement. Patient is DNR/DNI as per family wishes.    Patient being admitted for dysphagia, family requesting PEG tube placement.

## 2018-11-05 NOTE — H&P ADULT - PROBLEM SELECTOR PLAN 7
IMPROVE VTE Individual Risk Assessment          RISK                                                          Points  [  ] Previous VTE                                                3  [  ] Thrombophilia                                             2  [ x ] Lower limb paralysis                                   2        (unable to hold up >15 seconds)    [  ] Current Cancer                                             2         (within 6 months)  [ x ] Immobilization > 24 hrs                              1  [  ] ICU/CCU stay > 24 hours                             1  [x  ] Age > 60                                                         1    IMPROVE VTE Score: 4  HSQ for DVT chemoppx

## 2018-11-05 NOTE — H&P ADULT - HISTORY OF PRESENT ILLNESS
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 Family requesting PEG tube placement. Patient was recently discharged to NH from [facility] with diagnosis of R MCA CVA. Family acknowledges risks and benefits of PEG tube placement. Patient is DNR/DNI as per family wishes.

## 2018-11-05 NOTE — H&P ADULT - NSHPREVIEWOFSYSTEMS_GEN_ALL_CORE
89F, bedbound, non-verbal, PMHx of CVA, HTN, COPD sent from NH for dysphagia. Family requesting PEG tube placement. could not be assessed

## 2018-11-05 NOTE — ED ADULT NURSE NOTE - ED STAT RN HANDOFF DETAILS
received  pt.in bed at 1910  pt.is a wake and  responsive. denies  pain. admitted  to medicine  for dysphagia,UTI and  dehydration.  patrick  cath  intact and  patent. not   in  distress

## 2018-11-05 NOTE — H&P ADULT - PROBLEM SELECTOR PLAN 1
Patient recently diagnosed with R MCA CVA. Speech and swallow evaluation recommended dysphagia puree diet with honey thick liquids w/ spoon feeds. However, as per family, patient has been having progressive difficulty in NH.  Family requesting PEG tube placement  Palliative consult w/ Dr Gaytan  GI consult for PEG placement

## 2018-11-05 NOTE — ED PROVIDER NOTE - CARE PLAN
Principal Discharge DX:	Dysphagia  Secondary Diagnosis:	UTI (urinary tract infection)  Secondary Diagnosis:	Dehydration

## 2018-11-06 LAB
ANION GAP SERPL CALC-SCNC: 6 MMOL/L — SIGNIFICANT CHANGE UP (ref 5–17)
BASOPHILS # BLD AUTO: 0.1 K/UL — SIGNIFICANT CHANGE UP (ref 0–0.2)
BASOPHILS NFR BLD AUTO: 0.9 % — SIGNIFICANT CHANGE UP (ref 0–2)
BUN SERPL-MCNC: 15 MG/DL — SIGNIFICANT CHANGE UP (ref 7–18)
CALCIUM SERPL-MCNC: 8.7 MG/DL — SIGNIFICANT CHANGE UP (ref 8.4–10.5)
CHLORIDE SERPL-SCNC: 107 MMOL/L — SIGNIFICANT CHANGE UP (ref 96–108)
CO2 SERPL-SCNC: 29 MMOL/L — SIGNIFICANT CHANGE UP (ref 22–31)
CREAT SERPL-MCNC: 0.47 MG/DL — LOW (ref 0.5–1.3)
EOSINOPHIL # BLD AUTO: 0.8 K/UL — HIGH (ref 0–0.5)
EOSINOPHIL NFR BLD AUTO: 12.3 % — HIGH (ref 0–6)
FOLATE SERPL-MCNC: 18 NG/ML — SIGNIFICANT CHANGE UP
GLUCOSE BLDC GLUCOMTR-MCNC: 104 MG/DL — HIGH (ref 70–99)
GLUCOSE BLDC GLUCOMTR-MCNC: 98 MG/DL — SIGNIFICANT CHANGE UP (ref 70–99)
GLUCOSE SERPL-MCNC: 83 MG/DL — SIGNIFICANT CHANGE UP (ref 70–99)
HBA1C BLD-MCNC: 5.3 % — SIGNIFICANT CHANGE UP (ref 4–5.6)
HCT VFR BLD CALC: 41.2 % — SIGNIFICANT CHANGE UP (ref 34.5–45)
HGB BLD-MCNC: 13 G/DL — SIGNIFICANT CHANGE UP (ref 11.5–15.5)
LYMPHOCYTES # BLD AUTO: 1.1 K/UL — SIGNIFICANT CHANGE UP (ref 1–3.3)
LYMPHOCYTES # BLD AUTO: 16.9 % — SIGNIFICANT CHANGE UP (ref 13–44)
MAGNESIUM SERPL-MCNC: 2.2 MG/DL — SIGNIFICANT CHANGE UP (ref 1.6–2.6)
MCHC RBC-ENTMCNC: 27.3 PG — SIGNIFICANT CHANGE UP (ref 27–34)
MCHC RBC-ENTMCNC: 31.6 GM/DL — LOW (ref 32–36)
MCV RBC AUTO: 86.5 FL — SIGNIFICANT CHANGE UP (ref 80–100)
MONOCYTES # BLD AUTO: 0.5 K/UL — SIGNIFICANT CHANGE UP (ref 0–0.9)
MONOCYTES NFR BLD AUTO: 8.2 % — SIGNIFICANT CHANGE UP (ref 2–14)
NEUTROPHILS # BLD AUTO: 4 K/UL — SIGNIFICANT CHANGE UP (ref 1.8–7.4)
NEUTROPHILS NFR BLD AUTO: 61.7 % — SIGNIFICANT CHANGE UP (ref 43–77)
PLATELET # BLD AUTO: 243 K/UL — SIGNIFICANT CHANGE UP (ref 150–400)
POTASSIUM SERPL-MCNC: 3.6 MMOL/L — SIGNIFICANT CHANGE UP (ref 3.5–5.3)
POTASSIUM SERPL-SCNC: 3.6 MMOL/L — SIGNIFICANT CHANGE UP (ref 3.5–5.3)
RBC # BLD: 4.76 M/UL — SIGNIFICANT CHANGE UP (ref 3.8–5.2)
RBC # FLD: 14.1 % — SIGNIFICANT CHANGE UP (ref 10.3–14.5)
SODIUM SERPL-SCNC: 142 MMOL/L — SIGNIFICANT CHANGE UP (ref 135–145)
TSH SERPL-MCNC: 0.84 UU/ML — SIGNIFICANT CHANGE UP (ref 0.34–4.82)
VIT B12 SERPL-MCNC: >2000 PG/ML — HIGH (ref 232–1245)
WBC # BLD: 6.5 K/UL — SIGNIFICANT CHANGE UP (ref 3.8–10.5)
WBC # FLD AUTO: 6.5 K/UL — SIGNIFICANT CHANGE UP (ref 3.8–10.5)

## 2018-11-06 PROCEDURE — 99223 1ST HOSP IP/OBS HIGH 75: CPT

## 2018-11-06 RX ORDER — SODIUM CHLORIDE 9 MG/ML
1000 INJECTION, SOLUTION INTRAVENOUS
Qty: 0 | Refills: 0 | Status: DISCONTINUED | OUTPATIENT
Start: 2018-11-06 | End: 2018-11-08

## 2018-11-06 RX ORDER — CHLORHEXIDINE GLUCONATE 213 G/1000ML
1 SOLUTION TOPICAL ONCE
Qty: 0 | Refills: 0 | Status: COMPLETED | OUTPATIENT
Start: 2018-11-07 | End: 2018-11-07

## 2018-11-06 RX ADMIN — Medication 1 DROP(S): at 21:08

## 2018-11-06 RX ADMIN — SODIUM CHLORIDE 75 MILLILITER(S): 9 INJECTION, SOLUTION INTRAVENOUS at 13:05

## 2018-11-06 RX ADMIN — LEVETIRACETAM 400 MILLIGRAM(S): 250 TABLET, FILM COATED ORAL at 17:32

## 2018-11-06 RX ADMIN — Medication 300 MILLIGRAM(S): at 17:32

## 2018-11-06 RX ADMIN — CEFTRIAXONE 100 GRAM(S): 500 INJECTION, POWDER, FOR SOLUTION INTRAMUSCULAR; INTRAVENOUS at 17:32

## 2018-11-06 RX ADMIN — HEPARIN SODIUM 5000 UNIT(S): 5000 INJECTION INTRAVENOUS; SUBCUTANEOUS at 06:31

## 2018-11-06 RX ADMIN — Medication 1 DROP(S): at 06:32

## 2018-11-06 RX ADMIN — Medication 3 MILLILITER(S): at 08:54

## 2018-11-06 RX ADMIN — HEPARIN SODIUM 5000 UNIT(S): 5000 INJECTION INTRAVENOUS; SUBCUTANEOUS at 17:33

## 2018-11-06 RX ADMIN — Medication 3 MILLILITER(S): at 14:52

## 2018-11-06 RX ADMIN — Medication 3 MILLILITER(S): at 21:45

## 2018-11-06 RX ADMIN — Medication 1 DROP(S): at 13:21

## 2018-11-06 RX ADMIN — LEVETIRACETAM 400 MILLIGRAM(S): 250 TABLET, FILM COATED ORAL at 06:31

## 2018-11-06 NOTE — PROGRESS NOTE ADULT - SUBJECTIVE AND OBJECTIVE BOX
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 Family requesting PEG tube placement. Patient was recently discharged to NH from [facility] with diagnosis of R MCA CVA. Family acknowledges risks and benefits of PEG tube placement. Patient is DNR/DNI as per family wishes.      Review of Systems:  Review of Systems: could not be assessed	      pt seen in icu [  ], reg med floor [  x ], bed [ x ], chair at bedside [   ], awake and responsive [ x ], lethargic [  ],  nad [ x ]    patrick [x]    Allergies    Allergy Status Unknown        Vitals    T(F): 98.4 (11-06-18 @ 05:42), Max: 98.9 (11-05-18 @ 15:37)  HR: 66 (11-06-18 @ 05:42) (66 - 93)  BP: 123/63 (11-06-18 @ 05:42) (107/51 - 148/74)  RR: 18 (11-06-18 @ 05:42) (17 - 18)  SpO2: 100% (11-06-18 @ 05:42) (97% - 100%)  Wt(kg): --  CAPILLARY BLOOD GLUCOSE      POCT Blood Glucose.: 96 mg/dL (05 Nov 2018 16:28)      Labs                          13.0   6.5   )-----------( 243      ( 06 Nov 2018 08:11 )             41.2       11-06    142  |  107  |  15  ----------------------------<  83  3.6   |  29  |  0.47<L>    Ca    8.7      06 Nov 2018 08:11  Mg     2.2     11-06    TPro  6.8  /  Alb  2.5<L>  /  TBili  1.1  /  DBili  x   /  AST  147<H>  /  ALT  78<H>  /  AlkPhos  158<H>  11-05                Radiology Results      Meds    MEDICATIONS  (STANDING):  ALBUTerol/ipratropium for Nebulization 3 milliLiter(s) Nebulizer every 6 hours  artificial  tears Solution 1 Drop(s) Both EYES three times a day  aspirin Suppository 300 milliGRAM(s) Rectal daily  cefTRIAXone   IVPB 1 Gram(s) IV Intermittent every 24 hours  heparin  Injectable 5000 Unit(s) SubCutaneous every 12 hours  levETIRAcetam  IVPB 250 milliGRAM(s) IV Intermittent every 12 hours      MEDICATIONS  (PRN):      Physical Exam    Neuro :  b/l weakness  Respiratory: CTA B/L  CV: RRR, S1S2, no murmurs,   Abdominal: Soft, NT, ND +BS,  Extremities: No edema, + peripheral pulses, right wrist contracted    ASSESSMENT    Dysphagia  uti possible 2nd to chronic patrick  h/o COPD (chronic obstructive pulmonary disease)  HTN (hypertension)  CVA (cerebral vascular accident)  History of cataract surgery  chronic patrick  No significant past surgical history        PLAN      gi cons for peg placement  palliative eval prior to peg  cont rocephin  f/u ucx  pul cons  cont current meds

## 2018-11-06 NOTE — PROGRESS NOTE ADULT - SUBJECTIVE AND OBJECTIVE BOX
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 Family requesting PEG tube placement. Patient was recently discharged to NH from [facility] with diagnosis of R MCA CVA. Family acknowledges risks and benefits of PEG tube placement. Patient is DNR/DNI as per family wishes.      HISTORY OF PRESENT ILLNESS: As above. Lethargic, poorly arousable, lying in bed in NAD.    MEDICATIONS  (STANDING):  ALBUTerol/ipratropium for Nebulization 3 milliLiter(s) Nebulizer every 6 hours  artificial  tears Solution 1 Drop(s) Both EYES three times a day  aspirin Suppository 300 milliGRAM(s) Rectal daily  cefTRIAXone   IVPB 1 Gram(s) IV Intermittent every 24 hours  heparin  Injectable 5000 Unit(s) SubCutaneous every 12 hours  levETIRAcetam  IVPB 250 milliGRAM(s) IV Intermittent every 12 hours      MEDICATIONS  (PRN):      Allergies    Allergy Status Unknown    Intolerances        PAST MEDICAL & SURGICAL HISTORY:  COPD (chronic obstructive pulmonary disease)  HTN (hypertension)  CVA (cerebral vascular accident)  History of cataract surgery      FAMILY HISTORY:  Family history of COPD (chronic obstructive pulmonary disease) (Child)      SOCIAL HISTORY  Smoking History:     REVIEW OF SYSTEMS:    CONSTITUTIONAL:  No fevers, chills, sweats    HEENT:  Eyes:  No diplopia or blurred vision. ENT:  No earache, sore throat or runny nose.    CARDIOVASCULAR:  No pressure, squeezing, tightness, or heaviness about the chest; no palpitations.    RESPIRATORY:  Per HPI    GASTROINTESTINAL:  No abdominal pain, nausea, vomiting or diarrhea.    GENITOURINARY:  No dysuria, frequency or urgency.    NEUROLOGIC:  No paresthesias, fasciculations, seizures + weakness.    PSYCHIATRIC:  No disorder of thought or mood.    Vital Signs Last 24 Hrs  T(C): 36.9 (2018 05:42), Max: 37.2 (2018 15:37)  T(F): 98.4 (2018 05:42), Max: 98.9 (2018 15:37)  HR: 66 (2018 05:42) (66 - 93)  BP: 123/63 (2018 05:42) (107/51 - 148/74)  BP(mean): --  RR: 18 (2018 05:42) (17 - 18)  SpO2: 100% (2018 05:42) (97% - 100%)  I&O's Detail      PHYSICAL EXAMINATION:    GENERAL: The patient is a well-developed, well-nourished _____in no apparent distress.     HEENT: Head is normocephalic and atraumatic. Extraocular muscles are intact. Mucous membranes are moist.     NECK: Supple.     LUNGS: Clear to auscultation without wheezing, rales, or rhonchi. Respirations unlabored    HEART: Regular rate and rhythm without murmur.    ABDOMEN: Soft, nontender, and nondistended.  No hepatosplenomegaly is noted.    EXTREMITIES: Without any cyanosis, clubbing, rash, lesions or edema.    NEUROLOGIC: +facial droop, +weakness      LABS:                        13.0   6.5   )-----------( 243      ( 2018 08:11 )             41.2     11-06    142  |  107  |  15  ----------------------------<  83  3.6   |  29  |  0.47<L>    Ca    8.7      2018 08:11  Mg     2.2     11-06    TPro  6.8  /  Alb  2.5<L>  /  TBili  1.1  /  DBili  x   /  AST  147<H>  /  ALT  78<H>  /  AlkPhos  158<H>  11-05    PT/INR - ( 2018 16:36 )   PT: 13.0 sec;   INR: 1.17 ratio         PTT - ( 2018 16:36 )  PTT:27.1 sec  Urinalysis Basic - ( 2018 16:36 )    Color: Yellow / Appearance: Slightly Turbid / S.015 / pH: x  Gluc: x / Ketone: Negative  / Bili: Negative / Urobili: 4   Blood: x / Protein: 30 mg/dL / Nitrite: Positive   Leuk Esterase: Moderate / RBC: 10-25 /HPF / WBC 3-5 /HPF   Sq Epi: x / Non Sq Epi: Occasional /HPF / Bacteria: Many /HPF                      MICROBIOLOGY:    RADIOLOGY & ADDITIONAL STUDIES:    CXR:  < from: Xray Chest 1 View-PORTABLE IMMEDIATE (18 @ 18:30) >  No evidence of any acute lung disease.    < end of copied text >    Ct scan chest:    ekg;    echo:

## 2018-11-06 NOTE — CONSULT NOTE ADULT - PROBLEM SELECTOR RECOMMENDATION 3
1- Monitor Blood pressure closely.  2- Blood pressure control.  3- BP. meds as per cardiology and primary care team.
Debility, prior CVA, dysphagia 2/2 new CVA, had been tolerating dysphagia diet, now w more difficulty. Family aware of risks/benefits of PEG, wish to proceed with PEG placement, GI to be consulted by primary team

## 2018-11-06 NOTE — CONSULT NOTE ADULT - RS GEN PE MLT RESP DETAILS PC
airway patent/good air movement/diminished breath sounds, L/respirations non-labored/breath sounds equal

## 2018-11-06 NOTE — PROGRESS NOTE ADULT - PROBLEM SELECTOR PLAN 1
Patient recently diagnosed with R MCA CVA. Speech and swallow evaluation recommended dysphagia puree diet with honey thick liquids w/ spoon feeds. However, as per family, patient has been having progressive difficulty in NH.  patient due to peg placement   Palliative consult w/ Dr Lukas corona noted   patient family aware with risk of peg placement   GI consult for PEG placement Dr Saucedo notified

## 2018-11-06 NOTE — CONSULT NOTE ADULT - SUBJECTIVE AND OBJECTIVE BOX
[  ] STAT REQUEST              [ X ] ROUTINE REQUEST    Patient is a 89 year old female with dysphagia. GI consulted for peg placement.      HPI:  89 year old female with multiple medical problems including CVA from nursing home presented with poor po intake associated with failure to thrive and weight loss. No abdominal pain, nausea, vomiting, hematemesis, hematochezia, melena, fevre, chills, SOB, hematuria or dysuria reported.        PAIN MANAGEMENT:  Pain Scale:                0 /10  Pain Location:      Prior Colonoscopy:  Unknown    PAST MEDICAL HISTORY  COPD    HTN   CVA        PAST SURGICAL HISTORY  Cataract surgery            Allergies    Allergy Status Unknown         MEDICATIONS  (STANDING):  ALBUTerol/ipratropium for Nebulization 3 milliLiter(s) Nebulizer every 6 hours  artificial  tears Solution 1 Drop(s) Both EYES three times a day  aspirin Suppository 300 milliGRAM(s) Rectal daily  cefTRIAXone   IVPB 1 Gram(s) IV Intermittent every 24 hours  dextrose 5% + sodium chloride 0.9%. 1000 milliLiter(s) (75 mL/Hr) IV Continuous <Continuous>  heparin  Injectable 5000 Unit(s) SubCutaneous every 12 hours  levETIRAcetam  IVPB 250 milliGRAM(s) IV Intermittent every 12 hours           SOCIAL HISTORY  Advanced Directives:       [  ] Full Code       [ X ] DNR  Marital Status:         [  ] M      [ X ] S      [  ] D       [  ] W  Children:       [ X ] Yes      [  ] No  Occupation:        [  ] Employed       [ X ] Unemployed       [  ] Retired  Diet:       [ X ] Regular       [  ] PEG feeding          [  ] NG tube feeding  Drug Use:           [ X ] No          [  ] Yes  Alcohol:           [ X ] No             [  ] Yes (socially)         [  ] Yes (chronic)  Tobacco:           [  ] Yes           [ X ] No        FAMILY HISTORY  [ X ] Heart Disease            [  ] Diabetes             [  ] HTN             [  ] Colon Cancer             [  ] Stomach Cancer              [  ] Pancreatic Cancer        VITAL SIGNS   Vital Signs Last 24 Hrs  T(C): 36.7 (06 Nov 2018 13:23), Max: 37.1 (06 Nov 2018 00:43)  T(F): 98 (06 Nov 2018 13:23), Max: 98.7 (06 Nov 2018 00:43)  HR: 87 (06 Nov 2018 13:23) (66 - 90)  BP: 114/70 (06 Nov 2018 13:23) (107/51 - 131/71)   RR: 18 (06 Nov 2018 13:23) (18 - 18)  SpO2: 99% (06 Nov 2018 13:23) (99% - 100%)           CBC Full  -  ( 06 Nov 2018 08:11 )  WBC Count : 6.5 K/uL  Hemoglobin : 13.0 g/dL  Hematocrit : 41.2 %  Platelet Count - Automated : 243 K/uL  Mean Cell Volume : 86.5 fl  Mean Cell Hemoglobin : 27.3 pg  Mean Cell Hemoglobin Concentration : 31.6 gm/dL  Auto Neutrophil # : 4.0 K/uL  Auto Lymphocyte # : 1.1 K/uL  Auto Monocyte # : 0.5 K/uL  Auto Eosinophil # : 0.8 K/uL  Auto Basophil # : 0.1 K/uL  Auto Neutrophil % : 61.7 %  Auto Lymphocyte % : 16.9 %  Auto Monocyte % : 8.2 %  Auto Eosinophil % : 12.3 %  Auto Basophil % : 0.9 %      11-06    142  |  107  |  15  ----------------------------<  83  3.6   |  29  |  0.47<L>    Ca    8.7      06 Nov 2018 08:11  Mg     2.2     11-06    TPro  6.8  /  Alb  2.5<L>  /  TBili  1.1  /  DBili  x   /  AST  147<H>  /  ALT  78<H>  /  AlkPhos  158<H>  11-05     PT/INR - ( 05 Nov 2018 16:36 )   PT: 13.0 sec;   INR: 1.17 ratio    PTT - ( 05 Nov 2018 16:36 )  PTT:27.1 sec      Urinalysis (11.05.18 @ 16:36)    Glucose Qualitative, Urine: Negative    Blood, Urine: Large    pH Urine: 9.0    Color: Yellow    Urine Appearance: Slightly Turbid    Bilirubin: Negative    Ketone - Urine: Negative    Specific Gravity: 1.015    Protein, Urine: 30 mg/dL    Urobilinogen: 4    Nitrite: Positive    Leukocyte Esterase Concentration: Moderate      ECG  Ventricular Rate 90 BPM    Atrial Rate 90 BPM    QRS Duration 62 ms    Q-T Interval 310 ms    QTC Calculation(Bezet) 379 ms    R Axis 2 degrees    T Axis 43 degrees    Diagnosis Line *** Poor data quality, interpretation may be adversely affected  Normal sinus rhythm  Low voltage QRS  Nonspecific ST abnormality  Abnormal ECG    RADIOLOGY/IMAGING                EXAM:  XR CHEST PORTABLE IMMED 1V                            PROCEDURE DATE:  11/05/2018          INTERPRETATION:  EXAM: PORTABLE CHEST.     CLINICAL INDICATION: PEG tube replacement. History of dysphagia.     TECHNIQUE: Semi supine AP view.     PRIOR EXAM: 10/30/2018.     FINDINGS:      Visualized lung fields are unremarkable. Magnified cardiac and   mediastinal silhouette is stable. There is no significant bony   abnormality.      IMPRESSION:     No evidence of any acute lung disease        EXAM:  CT ANGIO NECK (W)AW IC                          EXAM:  CT ANGIO BRAIN (W)AW IC                            PROCEDURE DATE:  10/30/2018          INTERPRETATION:  CT ANGIOGRAPHY OF THE HEAD AND NECK  NONCONTRAST CT HEAD    HISTORY: 89 years old. Female. CVA.    COMPARISON: Noncontrast CT head 10/3/2018 11:09 AM.    TECHNIQUE:   1. Noncontrast CT head was performed.  2. CT angiographic evaluation of the head and neck was performed   consisting of contiguous axial sections scanned from the vertex to the   level of the aortic arch following the intravenous administration of   iodinated contrast. The acquired data was post-processed to generate   3D/MIP coronal, and sagittal reformats of the head and neck arterial   vasculature. Vascular stenosis is measured by NASCET criteria comparing   the narrowest segment with the distal luminal diameter as related to the   reported measure of arterial narrowing.    FINDINGS:    CT head:  There is an acute to subacute infarct in the right basal ganglia and   corona radiata. There appears to be involvement of the right posterior   insula. Mild mass effect without midline shift. No acute intracranial   hemorrhage.    The ventricles and cortical sulci are prominent reflecting parenchymal   volume loss.  Scattered hypodensities in the white matter are nonspecific, but likely   sequela small vessel ischemic disease.    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    Small mucus retention cyst/polyp in the left frontal sinus. And   opacification of the bilateral posterior mastoid air cells. The native   ocular lenses are surgically absent. Hyperostosis frontalis interna.    CTA head and neck:  Conventional arch anatomy. Great vessel origins are patent. Innominate   and visualized subclavian arteries are patent.    Anterior circulation:  Moderate right and mild left calcified and noncalcified atherosclerotic   plaque along the bilateral carotid bifurcations, without contributing to   significant stenosis. The cervical right internal carotid artery is   tortuous; the cervical internal carotid arteries are otherwise patent   without significant stenosis. Atherosclerotic calcifications along the   bilateral cavernous and supraclinoid internal carotid artery segments  without significant stenosis.    The anterior and middle cerebral arteries show normal caliber and   branching pattern.     Posterior circulation:  Bilateral vertebral arteries are patent from origins to intracranial   segments, with scattered atherosclerotic plaque along the bilateral V4   without causing hemodynamically significant stenosis. The basilar artery   is unremarkable. The posterior cerebral arteries are normal in caliber   and arise from the basilar tip.     Other:  No enlarged cervical adenopathy by size criteria.    The visualized lung apices are clear.    Multilevel degenerative changes of the cervical spine are noted.    IMPRESSION:  Patent cervical and intracranial vessels without evidence of abrupt   vessel cut off, hemodynamically significant stenosis, aneurysm, or   dissection.    Noncontrast CT head demonstrates an acute to subacute infarct in the   right corona radiata, with involvement of the right posterior insula.
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 Family requesting PEG tube placement. Patient was recently discharged to NH from [facility] with diagnosis of R MCA CVA. Family acknowledges risks and benefits of PEG tube placement. Patient is DNR/DNI as per family wishes. (2018 17:53)    Interval history: Patient known to me from previous admission for R MCA CVA, goals discussed w family at that time, wished for comfort feeds and discharged to [facility] on hospice care. Patient now returns for not eating since last several days. Daughter at bedside reports that the patient is having more difficulty swallowing and they have discussed with the patient who is agreeable for PEG placement.       PAST MEDICAL & SURGICAL HISTORY:  COPD (chronic obstructive pulmonary disease)  HTN (hypertension)  CVA (cerebral vascular accident)  History of cataract surgery      SOCIAL HISTORY:  has 2 children, granddaughter  Admitted from:  [facility]  Preferred Language: Rwandan    Surrogate/HCP/Guardian:  Radha Yusuf          Phone#:[phone]    FAMILY HISTORY:  Family history of COPD (chronic obstructive pulmonary disease) (Child)    Baseline ADLs (prior to admission): bedbound, responsive, dependent on ADLs    Allergies    Allergy Status Unknown    Intolerances      Present Symptoms:   Dyspnea:   Nausea/Vomiting:   Anxiety:  Depressed   Fatigue:  Loss of appetite:   Pain:                                location:          Review of Systems: [All others negative or Unable to obtain due to poor mentation]    MEDICATIONS  (STANDING):  ALBUTerol/ipratropium for Nebulization 3 milliLiter(s) Nebulizer every 6 hours  artificial  tears Solution 1 Drop(s) Both EYES three times a day  aspirin Suppository 300 milliGRAM(s) Rectal daily  cefTRIAXone   IVPB 1 Gram(s) IV Intermittent every 24 hours  heparin  Injectable 5000 Unit(s) SubCutaneous every 12 hours  levETIRAcetam  IVPB 250 milliGRAM(s) IV Intermittent every 12 hours    MEDICATIONS  (PRN):      PHYSICAL EXAM:    Vital Signs Last 24 Hrs  T(C): 36.9 (2018 05:42), Max: 37.2 (2018 15:37)  T(F): 98.4 (2018 05:42), Max: 98.9 (2018 15:37)  HR: 66 (2018 05:42) (66 - 93)  BP: 123/63 (2018 05:42) (107/51 - 148/74)  BP(mean): --  RR: 18 (2018 05:42) (17 - 18)  SpO2: 100% (2018 05:42) (97% - 100%)     Karnofsky Performance Score/Palliative Performance Status Version2: 40 %     GENERAL: alert, dysarthric,  NAD  HEENT: Atraumatic, oropharynx clear, neck supple  CHEST/LUNG: unlabored  HEART: Regular rate and rhythm    ABDOMEN: Soft, Nontender, Nondistended   MUSCULOSKELETAL:  No  edema,  bedbound  NERVOUS SYSTEM:  Alert ,  follows simple commands  SKIN: No rashes or lesions noted  Oral intake: npo, poor       LABS:                        13.0   6.5   )-----------( 243      ( 2018 08:11 )             41.2     11-06    142  |  107  |  15  ----------------------------<  83  3.6   |  29  |  0.47<L>    Ca    8.7      2018 08:11  Mg     2.2     11-    TPro  6.8  /  Alb  2.5<L>  /  TBili  1.1  /  DBili  x   /  AST  147<H>  /  ALT  78<H>  /  AlkPhos  158<H>  11-05    Urinalysis Basic - ( 2018 16:36 )    Color: Yellow / Appearance: Slightly Turbid / S.015 / pH: x  Gluc: x / Ketone: Negative  / Bili: Negative / Urobili: 4   Blood: x / Protein: 30 mg/dL / Nitrite: Positive   Leuk Esterase: Moderate / RBC: 10-25 /HPF / WBC 3-5 /HPF   Sq Epi: x / Non Sq Epi: Occasional /HPF / Bacteria: Many /HPF        RADIOLOGY & ADDITIONAL STUDIES:    ADVANCE DIRECTIVES: MOLST, HCP
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 Family requesting PEG tube placement. Patient was recently discharged to NH from [facility] with diagnosis of R MCA CVA. Family acknowledges risks and benefits of PEG tube placement. Patient is DNR/DNI as per family wishes. (2018 17:53)      PAST MEDICAL & SURGICAL HISTORY:  COPD (chronic obstructive pulmonary disease)  HTN (hypertension)  CVA (cerebral vascular accident)  History of cataract surgery      Allergy Status Unknown      ALBUTerol/ipratropium for Nebulization 3 milliLiter(s) Nebulizer every 6 hours  artificial  tears Solution 1 Drop(s) Both EYES three times a day  aspirin Suppository 300 milliGRAM(s) Rectal daily  cefTRIAXone   IVPB 1 Gram(s) IV Intermittent every 24 hours  heparin  Injectable 5000 Unit(s) SubCutaneous every 12 hours  levETIRAcetam  IVPB 250 milliGRAM(s) IV Intermittent every 12 hours      Social Hx:    FAMILY HISTORY:  Family history of COPD (chronic obstructive pulmonary disease) (Child)        ROS  [ x ] UNABLE TO ELICIT    General:  [  ] None  [  ] Fever  [  ] Chills  [  ] Malaise    Skin:  [  ] None [  ] Rash  [  ] Wound  [  ] Ulcer    HEENT:  [  ] None  [  ] Sore Throat  [  ] Nasal congestion/ runny nose  [  ] Photophobia [  ] Neck pain      Chest:  [  ] None   [  ] SOB  [  ] Cough  [  ] None    Cardiovascular:   [  ] None  [  ] CP  [  ] Palpitation    Gastrointestinal:  [  ] None  [  ] Abd pain   [  ] Nausea    [  ] Vomiting   [  ] Diarrhea	     Genitourinary:  [  ] None [  ] Polyuria   [  ] Urgency  [  ] Frequency  [  ] Dysuria    [  ]  Hematuria       Musculoskeletal:  [  ] None [  ] Back Pain	[  ] Body aches  [  ] Joint pain    Neurological: [  ] None [  ]Dizziness  [  ]Visual Disturbance  [  ]Headaches   [  ] Weakness      PHYSICAL EXAM:    Vital Signs Last 24 Hrs  T(C): 36.9 (2018 05:42), Max: 37.2 (2018 15:37)  T(F): 98.4 (2018 05:42), Max: 98.9 (2018 15:37)  HR: 66 (2018 05:42) (66 - 93)  BP: 123/63 (2018 05:42) (107/51 - 148/74)  BP(mean): --  RR: 18 (2018 05:42) (17 - 18)  SpO2: 100% (2018 05:42) (97% - 100%)      HEENT: [  ] Wnl  [ x ] Left side facial droop    Neck:  [ x ] Supple  [  ]Lymphadenopathy  [  ] No JVD   [  ] JVD  [  ] Masses   [  ] WNL    CHEST/Respiratory:  [ x ]Clear to auscultation  [  ] Rales   [  ] Rhonchi   [  ] Wheezing     [  ] Chest Tenderness      Cardiovascular:  [ x ] Reg S1 S2   [  ] Irreg S1 S2   [ x ]No Murmur  [  ] +ve Murmurs  [  ]Systolic [  ]Diastolic      Abdomen:  [ x ] Soft  [ x ] No tendrerness  [  ] Tenderness  [  ] Organomegaly  [  ] ABD Distention  [  ] Rigidity                       [ x ] No Regidity                       [ x ] No Rebound Tenderness  [  ] No Guarding Rigidity  [  ] Rebound Tenderness[  ] Guarding Rigidity                          [ x ]  +ve Bowel Sounds  [  ] Decreased Bowel Sounds    [  ] Absent Bowel Sounds                            Extremities: [ x ] No edema [  ] Edema  [  ] Clubbing   [  ] Cyanosis                         [ x ] No Tender Calf muscles  [  ] Tender Calf muscles                        [ x ] Palpable peripheral pulses    Neurological: [  ] Awake  [  ] Alert  [ x ] Oriented  x  1                           [  ] Confused  [ x ] Drowzy  [  ] repond to painful stimuli  [  ] Unresponsive    Skin:  [ x ] Intact [  ] Redness [  ] Thrombophlebitis  [  ] Rashes  [  ] Dry  [  ] Ulcers    Ortho:  [  ] Joint Swelling  [  ] Joint erythema [  ] Joint tenderness                [  ] Increased temp. to touch  [  ] DJD [ x ] Contracted right hand.    LABS/DIAGNOSTIC TESTS                          13.1   7.6   )-----------( 245      ( 2018 16:36 )             41.7     WBC Count: 7.6 K/uL ( @ 16:36)          141  |  107  |  14  ----------------------------<  93  3.7   |  29  |  0.51    Ca    9.0      2018 16:36  Mg     2.1         TPro  6.8  /  Alb  2.5<L>  /  TBili  1.1  /  DBili  x   /  AST  147<H>  /  ALT  78<H>  /  AlkPhos  158<H>  11-05      Urinalysis Basic - ( 2018 16:36 )    Color: Yellow / Appearance: Slightly Turbid / S.015 / pH: x  Gluc: x / Ketone: Negative  / Bili: Negative / Urobili: 4   Blood: x / Protein: 30 mg/dL / Nitrite: Positive   Leuk Esterase: Moderate / RBC: 10-25 /HPF / WBC 3-5 /HPF   Sq Epi: x / Non Sq Epi: Occasional /HPF / Bacteria: Many /HPF        LIVER FUNCTIONS - ( 2018 16:36 )  Alb: 2.5 g/dL / Pro: 6.8 g/dL / ALK PHOS: 158 U/L / ALT: 78 U/L DA / AST: 147 U/L / GGT: x             PT/INR - ( 2018 16:36 )   PT: 13.0 sec;   INR: 1.17 ratio         PTT - ( 2018 16:36 )  PTT:27.1 sec    LACTATE:      CULTURES:   None yet.    RADIOLOGY    CXR:    EXAM:  XR CHEST PORTABLE IMMED 1V                            PROCEDURE DATE:  2018          INTERPRETATION:  EXAM: PORTABLE CHEST.     CLINICAL INDICATION: PEG tube replacement. History of dysphagia.     TECHNIQUE: Semi supine AP view.     PRIOR EXAM: 10/30/2018.     FINDINGS:      Visualized lung fields are unremarkable. Magnified cardiac and   mediastinal silhouette is stable. There is no significant bony   abnormality.      IMPRESSION:     No evidence of any acute lung disease.

## 2018-11-06 NOTE — CONSULT NOTE ADULT - ASSESSMENT
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 Family requesting PEG tube placement. Patient was recently discharged to NH from [facility] with diagnosis of R MCA CVA. Family acknowledges risks and benefits of PEG tube placement. Patient is DNR/DNI as per family wishes.  U/A suggested for UTI and urine was cloudy and patient was started on IV Rocephin.
1. Poor po intake  2. Failure to thrive  3. Patient can benefit from Peg placement for nutritional support    Suggestions:    1. Palliative evaluation  2. Schedule for PEG placement  3. Avoid NSAID  4. DVT prophylaxis

## 2018-11-06 NOTE — CONSULT NOTE ADULT - PROBLEM SELECTOR RECOMMENDATION 4
Met with patient's daughter Mercy at bedside, discussed risks/benefits of PEG placement including dislodgement, infection, bleeding, aspiration risk. She expressed that they hoped she would eat on her own however has had more difficulty so they would like to provide her nutritional support w artificial nutrition. Also discussed w patient who was agreeable. All questions answered. Support provided.

## 2018-11-06 NOTE — CONSULT NOTE ADULT - PROBLEM SELECTOR RECOMMENDATION 9
R MCA CVA, dysarthric, bedbound from previous strokes.  on asa, statin. Hospice eligible. Alert and interactive.
1- UA & CS.  2- Blood culture.  3- Us catheter (chronic urine retention).  4- Rocephin 1 gm IVPB q day.  5- IV and PO hydration.  6- CBC and BMP follow up.

## 2018-11-06 NOTE — PROGRESS NOTE ADULT - SUBJECTIVE AND OBJECTIVE BOX
PGY2 Note discussed with  primary attending    Patient is a 89y old  Female who presents with a chief complaint of dysphagia (2018 11:52)      INTERVAL HPI/OVERNIGHT EVENTS: offers no new complaints; current symptoms resolving    MEDICATIONS  (STANDING):  ALBUTerol/ipratropium for Nebulization 3 milliLiter(s) Nebulizer every 6 hours  artificial  tears Solution 1 Drop(s) Both EYES three times a day  aspirin Suppository 300 milliGRAM(s) Rectal daily  cefTRIAXone   IVPB 1 Gram(s) IV Intermittent every 24 hours  dextrose 5% + sodium chloride 0.9%. 1000 milliLiter(s) (75 mL/Hr) IV Continuous <Continuous>  heparin  Injectable 5000 Unit(s) SubCutaneous every 12 hours  levETIRAcetam  IVPB 250 milliGRAM(s) IV Intermittent every 12 hours    MEDICATIONS  (PRN):      __________________________________________________  REVIEW OF SYSTEMS: Unable to obtain            Vital Signs Last 24 Hrs  T(C): 36.7 (2018 13:23), Max: 37.2 (2018 15:37)  T(F): 98 (2018 13:23), Max: 98.9 (2018 15:37)  HR: 87 (2018 13:23) (66 - 93)  BP: 114/70 (2018 13:23) (107/51 - 139/74)  BP(mean): --  RR: 18 (2018 13:23) (17 - 18)  SpO2: 99% (2018 13:23) (99% - 100%)    ________________________________________________  PHYSICAL EXAM:  GENERAL: NAD  HEENT: Normocephalic;  conjunctivae and sclerae clear; moist mucous membranes;   NECK : supple  CHEST/LUNG: Clear to auscultation bilaterally with good air entry   HEART: S1 S2  regular; no murmurs, gallops or rubs  ABDOMEN: Soft, Nontender, Nondistended; Bowel sounds present  EXTREMITIES: no cyanosis; no edema; no calf tenderness  SKIN: warm and dry; no rash  NERVOUS SYSTEM:  Awake and alert; Oriented  to place, person and time ; no new deficits    _________________________________________________  LABS:                        13.0   6.5   )-----------( 243      ( 2018 08:11 )             41.2         142  |  107  |  15  ----------------------------<  83  3.6   |  29  |  0.47<L>    Ca    8.7      2018 08:11  Mg     2.2         TPro  6.8  /  Alb  2.5<L>  /  TBili  1.1  /  DBili  x   /  AST  147<H>  /  ALT  78<H>  /  AlkPhos  158<H>  11    PT/INR - ( 2018 16:36 )   PT: 13.0 sec;   INR: 1.17 ratio         PTT - ( 2018 16:36 )  PTT:27.1 sec  Urinalysis Basic - ( 2018 16:36 )    Color: Yellow / Appearance: Slightly Turbid / S.015 / pH: x  Gluc: x / Ketone: Negative  / Bili: Negative / Urobili: 4   Blood: x / Protein: 30 mg/dL / Nitrite: Positive   Leuk Esterase: Moderate / RBC: 10-25 /HPF / WBC 3-5 /HPF   Sq Epi: x / Non Sq Epi: Occasional /HPF / Bacteria: Many /HPF      CAPILLARY BLOOD GLUCOSE      POCT Blood Glucose.: 96 mg/dL (2018 16:28)        RADIOLOGY & ADDITIONAL TESTS:      Consultant(s) Notes Reviewed:   YES    Care Discussed with Consultants : yes    Plan of care was discussed with patient and /or primary care giver; all questions and concerns were addressed and care was aligned with patient's wishes.

## 2018-11-06 NOTE — CONSULT NOTE ADULT - PROBLEM SELECTOR RECOMMENDATION 2
1- Bronchodilators.  2- O2 as needed.  3- Pulmonary evaluation and management.  4- Steroids as per primary, and pulmonary team
Bedbound at baseline from previous strokes. Dependent on ADLs, frequent positioning.

## 2018-11-06 NOTE — PROGRESS NOTE ADULT - PROBLEM SELECTOR PLAN 4
Will hold off from oral HTN meds given dysphagia  Monitor BP and give IV medications as needed  BP is stable

## 2018-11-07 LAB
-  AMIKACIN: SIGNIFICANT CHANGE UP
-  AMOXICILLIN/CLAVULANIC ACID: SIGNIFICANT CHANGE UP
-  AMPICILLIN/SULBACTAM: SIGNIFICANT CHANGE UP
-  AMPICILLIN: SIGNIFICANT CHANGE UP
-  AZTREONAM: SIGNIFICANT CHANGE UP
-  CEFAZOLIN: SIGNIFICANT CHANGE UP
-  CEFEPIME: SIGNIFICANT CHANGE UP
-  CEFOXITIN: SIGNIFICANT CHANGE UP
-  CEFTRIAXONE: SIGNIFICANT CHANGE UP
-  CIPROFLOXACIN: SIGNIFICANT CHANGE UP
-  ERTAPENEM: SIGNIFICANT CHANGE UP
-  GENTAMICIN: SIGNIFICANT CHANGE UP
-  LEVOFLOXACIN: SIGNIFICANT CHANGE UP
-  MEROPENEM: SIGNIFICANT CHANGE UP
-  NITROFURANTOIN: SIGNIFICANT CHANGE UP
-  PIPERACILLIN/TAZOBACTAM: SIGNIFICANT CHANGE UP
-  TOBRAMYCIN: SIGNIFICANT CHANGE UP
-  TRIMETHOPRIM/SULFAMETHOXAZOLE: SIGNIFICANT CHANGE UP
CULTURE RESULTS: SIGNIFICANT CHANGE UP
METHOD TYPE: SIGNIFICANT CHANGE UP
ORGANISM # SPEC MICROSCOPIC CNT: SIGNIFICANT CHANGE UP
ORGANISM # SPEC MICROSCOPIC CNT: SIGNIFICANT CHANGE UP
SPECIMEN SOURCE: SIGNIFICANT CHANGE UP

## 2018-11-07 RX ADMIN — HEPARIN SODIUM 5000 UNIT(S): 5000 INJECTION INTRAVENOUS; SUBCUTANEOUS at 18:08

## 2018-11-07 RX ADMIN — Medication 1 DROP(S): at 05:18

## 2018-11-07 RX ADMIN — CEFTRIAXONE 100 GRAM(S): 500 INJECTION, POWDER, FOR SOLUTION INTRAMUSCULAR; INTRAVENOUS at 18:07

## 2018-11-07 RX ADMIN — SODIUM CHLORIDE 75 MILLILITER(S): 9 INJECTION, SOLUTION INTRAVENOUS at 04:00

## 2018-11-07 RX ADMIN — LEVETIRACETAM 400 MILLIGRAM(S): 250 TABLET, FILM COATED ORAL at 18:07

## 2018-11-07 RX ADMIN — CHLORHEXIDINE GLUCONATE 1 APPLICATION(S): 213 SOLUTION TOPICAL at 05:02

## 2018-11-07 RX ADMIN — Medication 3 MILLILITER(S): at 20:51

## 2018-11-07 RX ADMIN — Medication 1 DROP(S): at 21:13

## 2018-11-07 RX ADMIN — Medication 3 MILLILITER(S): at 08:43

## 2018-11-07 RX ADMIN — LEVETIRACETAM 400 MILLIGRAM(S): 250 TABLET, FILM COATED ORAL at 05:02

## 2018-11-07 NOTE — PROGRESS NOTE ADULT - SUBJECTIVE AND OBJECTIVE BOX
Patient is a 89y old  Female who presents with a chief complaint of dysphagia (07 Nov 2018 09:59)    pt seen in icu [  ], reg med floor [  x ], bed [ x ], chair at bedside [   ], awake and responsive [ x ], lethargic [  ],  nad [ x ]    patrick [x]      Allergies    Allergy Status Unknown        Vitals    T(F): 97.8 (11-07-18 @ 04:02), Max: 98 (11-06-18 @ 13:23)  HR: 74 (11-07-18 @ 04:02) (74 - 87)  BP: 122/55 (11-07-18 @ 04:02) (114/70 - 128/67)  RR: 18 (11-07-18 @ 04:02) (17 - 18)  SpO2: 97% (11-07-18 @ 04:02) (94% - 99%)  Wt(kg): --  CAPILLARY BLOOD GLUCOSE      POCT Blood Glucose.: 98 mg/dL (06 Nov 2018 21:29)      Labs                          13.0   6.5   )-----------( 243      ( 06 Nov 2018 08:11 )             41.2       11-06    142  |  107  |  15  ----------------------------<  83  3.6   |  29  |  0.47<L>    Ca    8.7      06 Nov 2018 08:11  Mg     2.2     11-06    TPro  6.8  /  Alb  2.5<L>  /  TBili  1.1  /  DBili  x   /  AST  147<H>  /  ALT  78<H>  /  AlkPhos  158<H>  11-05            .Urine Clean Catch (Midstream)  11-05 @ 23:53   >100,000 CFU/ml Proteus mirabilis  --  --          Radiology Results      Meds    MEDICATIONS  (STANDING):  ALBUTerol/ipratropium for Nebulization 3 milliLiter(s) Nebulizer every 6 hours  artificial  tears Solution 1 Drop(s) Both EYES three times a day  aspirin Suppository 300 milliGRAM(s) Rectal daily  cefTRIAXone   IVPB 1 Gram(s) IV Intermittent every 24 hours  dextrose 5% + sodium chloride 0.9%. 1000 milliLiter(s) (75 mL/Hr) IV Continuous <Continuous>  heparin  Injectable 5000 Unit(s) SubCutaneous every 12 hours  levETIRAcetam  IVPB 250 milliGRAM(s) IV Intermittent every 12 hours      MEDICATIONS  (PRN):      Physical Exam      Neuro :  b/l weakness  Respiratory: CTA B/L  CV: RRR, S1S2, no murmurs,   Abdominal: Soft, NT, ND +BS,  Extremities: No edema, + peripheral pulses, right wrist contracted    ASSESSMENT    Dysphagia  uti possible 2nd to chronic patrick  h/o COPD (chronic obstructive pulmonary disease)  HTN (hypertension)  CVA (cerebral vascular accident)  History of cataract surgery  chronic patrick  No significant past surgical history        PLAN      gi cons   pt for peg placement today  palliative eval noted  cont rocephin  id f/u noted  ucx with proteus  pulm f/u   cont bronchodilators  cont current meds

## 2018-11-07 NOTE — PROGRESS NOTE ADULT - SUBJECTIVE AND OBJECTIVE BOX
PGY 2 Note discussed with  primary attending    Patient is a 89y old  Female who presents with a chief complaint of dysphagia (2018 13:21)      INTERVAL HPI/OVERNIGHT EVENTS: Patient seen and examined at bed side, likely to get peg tube today offers no new complaints; current symptoms resolving    MEDICATIONS  (STANDING):  ALBUTerol/ipratropium for Nebulization 3 milliLiter(s) Nebulizer every 6 hours  artificial  tears Solution 1 Drop(s) Both EYES three times a day  aspirin Suppository 300 milliGRAM(s) Rectal daily  cefTRIAXone   IVPB 1 Gram(s) IV Intermittent every 24 hours  dextrose 5% + sodium chloride 0.9%. 1000 milliLiter(s) (75 mL/Hr) IV Continuous <Continuous>  heparin  Injectable 5000 Unit(s) SubCutaneous every 12 hours  levETIRAcetam  IVPB 250 milliGRAM(s) IV Intermittent every 12 hours    MEDICATIONS  (PRN):      __________________________________________________  REVIEW OF SYSTEMS: Unable to obtain       Vital Signs Last 24 Hrs  T(C): 36.6 (2018 04:02), Max: 36.6 (2018 04:02)  T(F): 97.8 (2018 04:02), Max: 97.8 (2018 04:02)  HR: 74 (2018 04:02) (74 - 86)  BP: 122/55 (2018 04:02) (122/55 - 128/67)  BP(mean): --  RR: 18 (2018 04:02) (17 - 18)  SpO2: 97% (2018 04:02) (94% - 97%)    ________________________________________________  PHYSICAL EXAM:  GENERAL: NAD  HEENT: Normocephalic;  conjunctivae and sclerae clear; moist mucous membranes;   NECK : supple  CHEST/LUNG: Clear to auscultation bilaterally with good air entry   HEART: S1 S2  regular; no murmurs, gallops or rubs  ABDOMEN: Soft, Nontender, Nondistended; Bowel sounds present  EXTREMITIES: no cyanosis; no edema; no calf tenderness  SKIN: warm and dry; no rash  NERVOUS SYSTEM:  awake and alert     _________________________________________________  LABS:                        13.0   6.5   )-----------( 243      ( 2018 08:11 )             41.2         142  |  107  |  15  ----------------------------<  83  3.6   |  29  |  0.47<L>    Ca    8.7      2018 08:11  Mg     2.2         TPro  6.8  /  Alb  2.5<L>  /  TBili  1.1  /  DBili  x   /  AST  147<H>  /  ALT  78<H>  /  AlkPhos  158<H>  11    PT/INR - ( 2018 16:36 )   PT: 13.0 sec;   INR: 1.17 ratio         PTT - ( 2018 16:36 )  PTT:27.1 sec  Urinalysis Basic - ( 2018 16:36 )    Color: Yellow / Appearance: Slightly Turbid / S.015 / pH: x  Gluc: x / Ketone: Negative  / Bili: Negative / Urobili: 4   Blood: x / Protein: 30 mg/dL / Nitrite: Positive   Leuk Esterase: Moderate / RBC: 10-25 /HPF / WBC 3-5 /HPF   Sq Epi: x / Non Sq Epi: Occasional /HPF / Bacteria: Many /HPF      CAPILLARY BLOOD GLUCOSE      POCT Blood Glucose.: 98 mg/dL (2018 21:29)  POCT Blood Glucose.: 104 mg/dL (2018 16:40)        RADIOLOGY & ADDITIONAL TESTS:  Consultant(s) Notes Reviewed:   YES      Plan of care was discussed with patient and /or primary care giver; all questions and concerns were addressed and care was aligned with patient's wishes.

## 2018-11-07 NOTE — PROGRESS NOTE ADULT - SUBJECTIVE AND OBJECTIVE BOX
Patient is a 89y old  Female who presents with a chief complaint of dysphagia (2018 10:35)  Awake, alert, comfortable in bed in NAD. Awaiting peg placement today.  INTERVAL HPI/OVERNIGHT EVENTS:      VITAL SIGNS:  T(F): 97.8 (18 @ 04:02)  HR: 74 (18 @ 04:02)  BP: 122/55 (18 @ 04:02)  RR: 18 (18 @ 04:02)  SpO2: 97% (18 @ 04:02)  Wt(kg): --  I&O's Detail    2018 07:01  -  2018 07:00  --------------------------------------------------------  IN:  Total IN: 0 mL    OUT:    Indwelling Catheter - Urethral: 250 mL  Total OUT: 250 mL    Total NET: -250 mL              REVIEW OF SYSTEMS:    CONSTITUTIONAL:  No fevers, chills, sweats    HEENT:  Eyes:  No diplopia or blurred vision. ENT:  No earache, sore throat or runny nose.    CARDIOVASCULAR:  No pressure, squeezing, tightness, or heaviness about the chest; no palpitations.    RESPIRATORY:  Per HPI    GASTROINTESTINAL:  No abdominal pain, nausea, vomiting or diarrhea.    GENITOURINARY:  No dysuria, frequency or urgency.    NEUROLOGIC:  No paresthesias, fasciculations, seizures or weakness.    PSYCHIATRIC:  No disorder of thought or mood.      PHYSICAL EXAM:    Constitutional: Well developed and nourished  Eyes:Perrla  ENMT: normal  Neck:supple  Respiratory: good air entry  Cardiovascular: S1 S2 regular  Gastrointestinal: Soft, Non tender  Extremities: No edema  Vascular:normal  Neurological:Awake, alert  Musculoskeletal:Normal      MEDICATIONS  (STANDING):  ALBUTerol/ipratropium for Nebulization 3 milliLiter(s) Nebulizer every 6 hours  artificial  tears Solution 1 Drop(s) Both EYES three times a day  aspirin Suppository 300 milliGRAM(s) Rectal daily  cefTRIAXone   IVPB 1 Gram(s) IV Intermittent every 24 hours  dextrose 5% + sodium chloride 0.9%. 1000 milliLiter(s) (75 mL/Hr) IV Continuous <Continuous>  heparin  Injectable 5000 Unit(s) SubCutaneous every 12 hours  levETIRAcetam  IVPB 250 milliGRAM(s) IV Intermittent every 12 hours    MEDICATIONS  (PRN):      Allergies    Allergy Status Unknown    Intolerances        LABS:                        13.0   6.5   )-----------( 243      ( 2018 08:11 )             41.2     11-06    142  |  107  |  15  ----------------------------<  83  3.6   |  29  |  0.47<L>    Ca    8.7      2018 08:11  Mg     2.2     11-    TPro  6.8  /  Alb  2.5<L>  /  TBili  1.1  /  DBili  x   /  AST  147<H>  /  ALT  78<H>  /  AlkPhos  158<H>  11-05    PT/INR - ( 2018 16:36 )   PT: 13.0 sec;   INR: 1.17 ratio         PTT - ( 2018 16:36 )  PTT:27.1 sec  Urinalysis Basic - ( 2018 16:36 )    Color: Yellow / Appearance: Slightly Turbid / S.015 / pH: x  Gluc: x / Ketone: Negative  / Bili: Negative / Urobili: 4   Blood: x / Protein: 30 mg/dL / Nitrite: Positive   Leuk Esterase: Moderate / RBC: 10-25 /HPF / WBC 3-5 /HPF   Sq Epi: x / Non Sq Epi: Occasional /HPF / Bacteria: Many /HPF            CAPILLARY BLOOD GLUCOSE      POCT Blood Glucose.: 98 mg/dL (2018 21:29)  POCT Blood Glucose.: 104 mg/dL (2018 16:40)        RADIOLOGY & ADDITIONAL TESTS:    CXR:    Ct scan chest:    ekg;    echo:

## 2018-11-07 NOTE — PROGRESS NOTE ADULT - SUBJECTIVE AND OBJECTIVE BOX
Meds:  cefTRIAXone   IVPB 1 Gram(s) IV Intermittent every 24 hours    Allergies:  Allergies    Allergy Status Unknown    Intolerances        ROS  [ x ] UNABLE TO ELICIT    General:  [  ] None  [  ] Fever  [  ] Chills  [  ] Malaise    Skin:  [  ] None [  ] Rash  [  ] Wound  [  ] Ulcer    HEENT:  [  ] None  [  ] Sore Throat  [  ] Nasal congestion/ runny nose  [  ] Photophobia [  ] Neck pain      Chest:  [  ] None   [  ] SOB  [  ] Cough  [  ] None    Cardiovascular:   [  ] None  [  ] CP  [  ] Palpitation    Gastrointestinal:  [  ] None  [  ] Abd pain   [  ] Nausea    [  ] Vomiting   [  ] Diarrhea	     Genitourinary:  [  ] None [  ] Polyuria   [  ] Urgency  [  ] Frequency  [  ] Dysuria    [  ]  Hematuria       Musculoskeletal:  [  ] None [  ] Back Pain	[  ] Body aches  [  ] Joint pain    Neurological: [  ] None [  ]Dizziness  [  ]Visual Disturbance  [  ]Headaches   [  ] Weakness          PHYSICAL EXAM:    Vital Signs Last 24 Hrs  T(C): 36.6 (07 Nov 2018 04:02), Max: 36.7 (06 Nov 2018 13:23)  T(F): 97.8 (07 Nov 2018 04:02), Max: 98 (06 Nov 2018 13:23)  HR: 74 (07 Nov 2018 04:02) (74 - 87)  BP: 122/55 (07 Nov 2018 04:02) (114/70 - 128/67)  BP(mean): --  RR: 18 (07 Nov 2018 04:02) (17 - 18)  SpO2: 97% (07 Nov 2018 04:02) (94% - 99%)        HEENT: [  ] Wnl  [ x ] Left side facial droop    Neck:  [ x ] Supple  [  ]Lymphadenopathy  [  ] No JVD   [  ] JVD  [  ] Masses   [  ] WNL    CHEST/Respiratory:  [ x ]Clear to auscultation  [  ] Rales   [  ] Rhonchi   [  ] Wheezing     [  ] Chest Tenderness      Cardiovascular:  [ x ] Reg S1 S2   [  ] Irreg S1 S2   [ x ]No Murmur  [  ] +ve Murmurs  [  ]Systolic [  ]Diastolic      Abdomen:  [ x ] Soft  [ x ] No tendrerness  [  ] Tenderness  [  ] Organomegaly  [  ] ABD Distention  [  ] Rigidity                       [ x ] No Regidity                       [ x ] No Rebound Tenderness  [  ] No Guarding Rigidity  [  ] Rebound Tenderness[  ] Guarding Rigidity                          [ x ]  +ve Bowel Sounds  [  ] Decreased Bowel Sounds    [  ] Absent Bowel Sounds                            Extremities: [ x ] No edema [  ] Edema  [  ] Clubbing   [  ] Cyanosis                         [ x ] No Tender Calf muscles  [  ] Tender Calf muscles                        [ x ] Palpable peripheral pulses    Neurological: [  ] Awake  [  ] Alert  [ x ] Oriented  x  1                           [  ] Confused  [ x ] Drowzy  [  ] repond to painful stimuli  [  ] Unresponsive    Skin:  [ x ] Intact [  ] Redness [  ] Thrombophlebitis  [  ] Rashes  [  ] Dry  [  ] Ulcers    Ortho:  [  ] Joint Swelling  [  ] Joint erythema [  ] Joint tenderness                [  ] Increased temp. to touch  [  ] DJD [ x ] Contracted right hand.          LABS/DIAGNOSTIC TESTS                          13.0   6.5   )-----------( 243      ( 06 Nov 2018 08:11 )             41.2         11-06    142  |  107  |  15  ----------------------------<  83  3.6   |  29  |  0.47<L>    Ca    8.7      06 Nov 2018 08:11  Mg     2.2     11-06    TPro  6.8  /  Alb  2.5<L>  /  TBili  1.1  /  DBili  x   /  AST  147<H>  /  ALT  78<H>  /  AlkPhos  158<H>  11-05      LIVER FUNCTIONS - ( 05 Nov 2018 16:36 )  Alb: 2.5 g/dL / Pro: 6.8 g/dL / ALK PHOS: 158 U/L / ALT: 78 U/L DA / AST: 147 U/L / GGT: x               CULTURES:   Culture - Urine (11.05.18 @ 23:53)    Specimen Source: .Urine Clean Catch (Midstream)    Culture Results:   >100,000 CFU/ml Proteus mirabilis      RADIOLOGY:    CXR:    EXAM:  XR CHEST PORTABLE IMMED 1V                            PROCEDURE DATE:  11/05/2018          INTERPRETATION:  EXAM: PORTABLE CHEST.     CLINICAL INDICATION: PEG tube replacement. History of dysphagia.     TECHNIQUE: Semi supine AP view.     PRIOR EXAM: 10/30/2018.     FINDINGS:      Visualized lung fields are unremarkable. Magnified cardiac and   mediastinal silhouette is stable. There is no significant bony   abnormality.      IMPRESSION:     No evidence of any acute lung disease.      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 Family requesting PEG tube placement. Patient was recently discharged to NH from [facility] with diagnosis of R MCA CVA. Family acknowledges risks and benefits of PEG tube placement. Patient is DNR/DNI as per family wishes.  U/A suggested for UTI and urine was cloudy and patient was started on IV Rocephin.  Urine culture grew Proteus mirabilis, and patient is planed to have PEG tube today 11/7/18.    Problem/Recommendation - 1:  Problem: UTI (urinary tract infection).   Recommendation:   1- UA & CS are +VE.  2- Blood culture.  3- Us catheter (chronic urine retention).  4- Continue Rocephin 1 gm IVPB q day.  5- IV and PO hydration.  6- CBC and BMP follow up.    Problem/Recommendation - 2:  ·  Problem: COPD (chronic obstructive pulmonary disease).    Recommendation:   1- Bronchodilators.  2- O2 as needed.  3- Pulmonary evaluation and management.  4- Steroids as per primary, and pulmonary team.     Problem/Recommendation - 3:  ·  Problem: HTN (hypertension).    Recommendation:   1- Monitor Blood pressure closely.  2- Blood pressure control.  3- BP. meds as per cardiology and primary care team.     Discussed with medical resident.

## 2018-11-07 NOTE — PROGRESS NOTE ADULT - PROBLEM SELECTOR PLAN 1
Poor oral intake with h/o stroke   peg tube placement today   c/w with iV fluids  for now   GI dr Sheryl corona noted   palliative eval noted

## 2018-11-07 NOTE — PROGRESS NOTE ADULT - SUBJECTIVE AND OBJECTIVE BOX
Patient is a 89y old  Female who presents with a chief complaint of dysphagia (2018 10:35)     Lethargic, poorly arousable, lying in bed in NAD.    INTERVAL HPI/OVERNIGHT EVENTS:      VITAL SIGNS:  T(F): 97.8 (18 @ 04:02)  HR: 74 (18 @ 04:02)  BP: 122/55 (18 @ 04:02)  RR: 18 (18 @ 04:02)  SpO2: 97% (18 @ 04:02)  Wt(kg): --  I&O's Detail    2018 07:01  -  2018 07:00  --------------------------------------------------------  IN:  Total IN: 0 mL    OUT:    Indwelling Catheter - Urethral: 250 mL  Total OUT: 250 mL    Total NET: -250 mL              REVIEW OF SYSTEMS:    CONSTITUTIONAL:  No fevers, chills, sweats    HEENT:  Eyes:  No diplopia or blurred vision. ENT:  No earache, sore throat or runny nose.    CARDIOVASCULAR:  No pressure, squeezing, tightness, or heaviness about the chest; no palpitations.    RESPIRATORY:  Per HPI    GASTROINTESTINAL:  No abdominal pain, nausea, vomiting or diarrhea.    GENITOURINARY:  No dysuria, frequency or urgency.    NEUROLOGIC:  No paresthesias, fasciculations, seizures or weakness.    PSYCHIATRIC:  No disorder of thought or mood.      PHYSICAL EXAM:    Constitutional: Well developed and nourished  Eyes:Perrla  ENMT: normal  Neck:supple  Respiratory: good air entry  Cardiovascular: S1 S2 regular  Gastrointestinal: Soft, Non tender  Extremities: No edema  Vascular:normal  Neurological: +facial droop, +weakness  Musculoskeletal:Normal      MEDICATIONS  (STANDING):  ALBUTerol/ipratropium for Nebulization 3 milliLiter(s) Nebulizer every 6 hours  artificial  tears Solution 1 Drop(s) Both EYES three times a day  aspirin Suppository 300 milliGRAM(s) Rectal daily  cefTRIAXone   IVPB 1 Gram(s) IV Intermittent every 24 hours  dextrose 5% + sodium chloride 0.9%. 1000 milliLiter(s) (75 mL/Hr) IV Continuous <Continuous>  heparin  Injectable 5000 Unit(s) SubCutaneous every 12 hours  levETIRAcetam  IVPB 250 milliGRAM(s) IV Intermittent every 12 hours    MEDICATIONS  (PRN):      Allergies    Allergy Status Unknown    Intolerances        LABS:                        13.0   6.5   )-----------( 243      ( 2018 08:11 )             41.2     11-    142  |  107  |  15  ----------------------------<  83  3.6   |  29  |  0.47<L>    Ca    8.7      2018 08:11  Mg     2.2         TPro  6.8  /  Alb  2.5<L>  /  TBili  1.1  /  DBili  x   /  AST  147<H>  /  ALT  78<H>  /  AlkPhos  158<H>  11    PT/INR - ( 2018 16:36 )   PT: 13.0 sec;   INR: 1.17 ratio         PTT - ( 2018 16:36 )  PTT:27.1 sec  Urinalysis Basic - ( 2018 16:36 )    Color: Yellow / Appearance: Slightly Turbid / S.015 / pH: x  Gluc: x / Ketone: Negative  / Bili: Negative / Urobili: 4   Blood: x / Protein: 30 mg/dL / Nitrite: Positive   Leuk Esterase: Moderate / RBC: 10-25 /HPF / WBC 3-5 /HPF   Sq Epi: x / Non Sq Epi: Occasional /HPF / Bacteria: Many /HPF            CAPILLARY BLOOD GLUCOSE      POCT Blood Glucose.: 98 mg/dL (2018 21:29)  POCT Blood Glucose.: 104 mg/dL (2018 16:40)        RADIOLOGY & ADDITIONAL TESTS:    CXR:  < from: Xray Chest 1 View-PORTABLE IMMEDIATE (18 @ 18:30) >  No evidence of any acute lung disease.    < end of copied text >    Ct scan chest:    ekg;    echo:

## 2018-11-08 LAB
ANION GAP SERPL CALC-SCNC: 9 MMOL/L — SIGNIFICANT CHANGE UP (ref 5–17)
BASOPHILS # BLD AUTO: 0.1 K/UL — SIGNIFICANT CHANGE UP (ref 0–0.2)
BASOPHILS NFR BLD AUTO: 1 % — SIGNIFICANT CHANGE UP (ref 0–2)
BUN SERPL-MCNC: 7 MG/DL — SIGNIFICANT CHANGE UP (ref 7–18)
CALCIUM SERPL-MCNC: 8.6 MG/DL — SIGNIFICANT CHANGE UP (ref 8.4–10.5)
CHLORIDE SERPL-SCNC: 108 MMOL/L — SIGNIFICANT CHANGE UP (ref 96–108)
CO2 SERPL-SCNC: 27 MMOL/L — SIGNIFICANT CHANGE UP (ref 22–31)
CREAT SERPL-MCNC: 0.34 MG/DL — LOW (ref 0.5–1.3)
EOSINOPHIL # BLD AUTO: 0.6 K/UL — HIGH (ref 0–0.5)
EOSINOPHIL NFR BLD AUTO: 8.9 % — HIGH (ref 0–6)
GLUCOSE SERPL-MCNC: 90 MG/DL — SIGNIFICANT CHANGE UP (ref 70–99)
HCT VFR BLD CALC: 37.1 % — SIGNIFICANT CHANGE UP (ref 34.5–45)
HGB BLD-MCNC: 12 G/DL — SIGNIFICANT CHANGE UP (ref 11.5–15.5)
LYMPHOCYTES # BLD AUTO: 1.7 K/UL — SIGNIFICANT CHANGE UP (ref 1–3.3)
LYMPHOCYTES # BLD AUTO: 23.9 % — SIGNIFICANT CHANGE UP (ref 13–44)
MAGNESIUM SERPL-MCNC: 1.9 MG/DL — SIGNIFICANT CHANGE UP (ref 1.6–2.6)
MCHC RBC-ENTMCNC: 27.6 PG — SIGNIFICANT CHANGE UP (ref 27–34)
MCHC RBC-ENTMCNC: 32.2 GM/DL — SIGNIFICANT CHANGE UP (ref 32–36)
MCV RBC AUTO: 85.7 FL — SIGNIFICANT CHANGE UP (ref 80–100)
MONOCYTES # BLD AUTO: 0.5 K/UL — SIGNIFICANT CHANGE UP (ref 0–0.9)
MONOCYTES NFR BLD AUTO: 7.7 % — SIGNIFICANT CHANGE UP (ref 2–14)
NEUTROPHILS # BLD AUTO: 4.2 K/UL — SIGNIFICANT CHANGE UP (ref 1.8–7.4)
NEUTROPHILS NFR BLD AUTO: 58.5 % — SIGNIFICANT CHANGE UP (ref 43–77)
PHOSPHATE SERPL-MCNC: 2.5 MG/DL — SIGNIFICANT CHANGE UP (ref 2.5–4.5)
PLATELET # BLD AUTO: 241 K/UL — SIGNIFICANT CHANGE UP (ref 150–400)
POTASSIUM SERPL-MCNC: 3.2 MMOL/L — LOW (ref 3.5–5.3)
POTASSIUM SERPL-SCNC: 3.2 MMOL/L — LOW (ref 3.5–5.3)
RBC # BLD: 4.33 M/UL — SIGNIFICANT CHANGE UP (ref 3.8–5.2)
RBC # FLD: 13.9 % — SIGNIFICANT CHANGE UP (ref 10.3–14.5)
SODIUM SERPL-SCNC: 144 MMOL/L — SIGNIFICANT CHANGE UP (ref 135–145)
WBC # BLD: 7.1 K/UL — SIGNIFICANT CHANGE UP (ref 3.8–10.5)
WBC # FLD AUTO: 7.1 K/UL — SIGNIFICANT CHANGE UP (ref 3.8–10.5)

## 2018-11-08 RX ORDER — FAMOTIDINE 10 MG/ML
40 INJECTION INTRAVENOUS DAILY
Qty: 0 | Refills: 0 | Status: DISCONTINUED | OUTPATIENT
Start: 2018-11-08 | End: 2018-11-09

## 2018-11-08 RX ORDER — ASPIRIN/CALCIUM CARB/MAGNESIUM 324 MG
81 TABLET ORAL DAILY
Qty: 0 | Refills: 0 | Status: DISCONTINUED | OUTPATIENT
Start: 2018-11-08 | End: 2018-11-09

## 2018-11-08 RX ORDER — ATORVASTATIN CALCIUM 80 MG/1
40 TABLET, FILM COATED ORAL AT BEDTIME
Qty: 0 | Refills: 0 | Status: DISCONTINUED | OUTPATIENT
Start: 2018-11-08 | End: 2018-11-09

## 2018-11-08 RX ORDER — LEVETIRACETAM 250 MG/1
250 TABLET, FILM COATED ORAL
Qty: 0 | Refills: 0 | Status: DISCONTINUED | OUTPATIENT
Start: 2018-11-08 | End: 2018-11-09

## 2018-11-08 RX ORDER — FAMOTIDINE 10 MG/ML
40 INJECTION INTRAVENOUS DAILY
Qty: 0 | Refills: 0 | Status: DISCONTINUED | OUTPATIENT
Start: 2018-11-08 | End: 2018-11-08

## 2018-11-08 RX ORDER — LISINOPRIL 2.5 MG/1
5 TABLET ORAL DAILY
Qty: 0 | Refills: 0 | Status: DISCONTINUED | OUTPATIENT
Start: 2018-11-08 | End: 2018-11-09

## 2018-11-08 RX ORDER — DOCUSATE SODIUM 100 MG
50 CAPSULE ORAL AT BEDTIME
Qty: 0 | Refills: 0 | Status: DISCONTINUED | OUTPATIENT
Start: 2018-11-08 | End: 2018-11-09

## 2018-11-08 RX ORDER — CEFTRIAXONE 500 MG/1
1 INJECTION, POWDER, FOR SOLUTION INTRAMUSCULAR; INTRAVENOUS EVERY 24 HOURS
Qty: 0 | Refills: 0 | Status: DISCONTINUED | OUTPATIENT
Start: 2018-11-08 | End: 2018-11-08

## 2018-11-08 RX ORDER — CEFTRIAXONE 500 MG/1
1 INJECTION, POWDER, FOR SOLUTION INTRAMUSCULAR; INTRAVENOUS EVERY 24 HOURS
Qty: 0 | Refills: 0 | Status: DISCONTINUED | OUTPATIENT
Start: 2018-11-08 | End: 2018-11-09

## 2018-11-08 RX ADMIN — ATORVASTATIN CALCIUM 40 MILLIGRAM(S): 80 TABLET, FILM COATED ORAL at 22:01

## 2018-11-08 RX ADMIN — Medication 50 MILLIGRAM(S): at 22:01

## 2018-11-08 RX ADMIN — HEPARIN SODIUM 5000 UNIT(S): 5000 INJECTION INTRAVENOUS; SUBCUTANEOUS at 05:31

## 2018-11-08 RX ADMIN — Medication 81 MILLIGRAM(S): at 17:14

## 2018-11-08 RX ADMIN — Medication 3 MILLILITER(S): at 20:19

## 2018-11-08 RX ADMIN — Medication 1 DROP(S): at 05:31

## 2018-11-08 RX ADMIN — Medication 1 DROP(S): at 22:01

## 2018-11-08 RX ADMIN — Medication 3 MILLILITER(S): at 14:48

## 2018-11-08 RX ADMIN — FAMOTIDINE 40 MILLIGRAM(S): 10 INJECTION INTRAVENOUS at 17:15

## 2018-11-08 RX ADMIN — Medication 3 MILLILITER(S): at 09:03

## 2018-11-08 RX ADMIN — LEVETIRACETAM 250 MILLIGRAM(S): 250 TABLET, FILM COATED ORAL at 17:47

## 2018-11-08 RX ADMIN — LEVETIRACETAM 400 MILLIGRAM(S): 250 TABLET, FILM COATED ORAL at 05:30

## 2018-11-08 RX ADMIN — CEFTRIAXONE 100 GRAM(S): 500 INJECTION, POWDER, FOR SOLUTION INTRAMUSCULAR; INTRAVENOUS at 18:01

## 2018-11-08 RX ADMIN — LISINOPRIL 5 MILLIGRAM(S): 2.5 TABLET ORAL at 18:01

## 2018-11-08 RX ADMIN — Medication 1 DROP(S): at 13:38

## 2018-11-08 RX ADMIN — HEPARIN SODIUM 5000 UNIT(S): 5000 INJECTION INTRAVENOUS; SUBCUTANEOUS at 17:15

## 2018-11-08 NOTE — PROGRESS NOTE ADULT - PROBLEM SELECTOR PLAN 1
Poor oral intake with h/o stroke   peg tube placed on 11/7/2018   started on tube feeds   GI dr Sheryl corona noted   palliative eval noted

## 2018-11-08 NOTE — DIETITIAN INITIAL EVALUATION ADULT. - OTHER INFO
Patient seen NPO status. Patient from [facility]. Extended Care & recently d/c from [facility]. Visited pt. debilitated, son at bedside, reports pt. appetite poor x4 days, eating very little of dysphagia pureed meals with thickened liquids & swallowing got more "difficulty/hard" for pt. at NH therefore decided on PEG placement, per son unsure of pt. UBW but may have loss wt. but unable to give amounts?, presently pt. NPO with IV fluids & s/p PEG placement on 11/7 & followed by GI/team, d/w MD, plans to initiate tube feeding today, skin intact. d/w RN.

## 2018-11-08 NOTE — DIETITIAN INITIAL EVALUATION ADULT. - FACTORS AFF FOOD INTAKE
pain/developmental delay/change in mental status/difficulty feeding self/difficulty chewing/difficulty swallowing/difficulty with food procurement/preparation/other (specify)/weakness, dysphagia, CVA, COPD, UTI, FTT

## 2018-11-08 NOTE — PROGRESS NOTE ADULT - ASSESSMENT
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 Family requesting PEG tube placement. Patient was recently discharged to NH from [facility] with diagnosis of R MCA CVA. Family acknowledges risks and benefits of PEG tube placement. Patient is DNR/DNI as per family wishes.    Patient being admitted for dysphagia, family requesting PEG tube placement.
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 Family requesting PEG tube placement. Patient was recently discharged to NH from [facility] with diagnosis of R MCA CVA. Family acknowledges risks and benefits of PEG tube placement. Patient is DNR/DNI as per family wishes.    Patient being admitted for dysphagia, family requesting PEG tube placement.
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 Family requesting PEG tube placement. Patient was recently discharged to NH from [facility] with diagnosis of R MCA CVA. Family acknowledges risks and benefits of PEG tube placement. Patient is DNR/DNI as per family wishes.    Patient being admitted for dysphagia, family requesting PEG tube placement.
1. Dysphagia  2. S/P Peg placement  3. Gastritis  4. Esophagitis    Suggestions:    1. Peg tube can be used for feeding and medication  2. Avoid NSAID  3. Aspiration Precaution  4. Protonix daily  5. Nutritional evaluation  6. DVT prophylaxis

## 2018-11-08 NOTE — DIETITIAN INITIAL EVALUATION ADULT. - NS AS NUTRI INTERV COLLABORAT
Collaboration with other nutrition professionals/Collaboration with other providers/RD at NH, as medically feasible

## 2018-11-08 NOTE — PROGRESS NOTE ADULT - SUBJECTIVE AND OBJECTIVE BOX
Patient is a 89y old  Female who presents with a chief complaint of dysphagia (08 Nov 2018 09:01)  Awake, alert, lying in bed in NAD. S/p peg placement    INTERVAL HPI/OVERNIGHT EVENTS:      VITAL SIGNS:  T(F): 98.7 (11-08-18 @ 05:07)  HR: 88 (11-08-18 @ 05:07)  BP: 125/50 (11-08-18 @ 05:07)  RR: 18 (11-08-18 @ 05:07)  SpO2: 96% (11-08-18 @ 05:07)  Wt(kg): --  I&O's Detail    07 Nov 2018 07:01  -  08 Nov 2018 07:00  --------------------------------------------------------  IN:  Total IN: 0 mL    OUT:    Indwelling Catheter - Urethral: 900 mL  Total OUT: 900 mL    Total NET: -900 mL              REVIEW OF SYSTEMS:    CONSTITUTIONAL:  No fevers, chills, sweats    HEENT:  Eyes:  No diplopia or blurred vision. ENT:  No earache, sore throat or runny nose.    CARDIOVASCULAR:  No pressure, squeezing, tightness, or heaviness about the chest; no palpitations.    RESPIRATORY:  Per HPI    GASTROINTESTINAL:  No abdominal pain, nausea, vomiting or diarrhea.    GENITOURINARY:  No dysuria, frequency or urgency.    NEUROLOGIC:  No paresthesias, fasciculations, seizures + weakness.                         +facial droop  PSYCHIATRIC:  No disorder of thought or mood.      PHYSICAL EXAM:    Constitutional: Well developed and nourished  Eyes:Perrla  ENMT: normal  Neck:supple  Respiratory: good air entry  Cardiovascular: S1 S2 regular  Gastrointestinal: Soft, Non tender  Extremities: No edema  Vascular:normal  Neurological:Awake, alert,Ox3  Musculoskeletal:Normal      MEDICATIONS  (STANDING):  ALBUTerol/ipratropium for Nebulization 3 milliLiter(s) Nebulizer every 6 hours  artificial  tears Solution 1 Drop(s) Both EYES three times a day  aspirin Suppository 300 milliGRAM(s) Rectal daily  cefTRIAXone   IVPB 1 Gram(s) IV Intermittent every 24 hours  heparin  Injectable 5000 Unit(s) SubCutaneous every 12 hours  levETIRAcetam  IVPB 250 milliGRAM(s) IV Intermittent every 12 hours    MEDICATIONS  (PRN):      Allergies    Allergy Status Unknown    Intolerances        LABS:                        12.0   7.1   )-----------( 241      ( 08 Nov 2018 06:58 )             37.1     11-08    144  |  108  |  7   ----------------------------<  90  3.2<L>   |  27  |  0.34<L>    Ca    8.6      08 Nov 2018 06:58  Phos  2.5     11-08  Mg     1.9     11-08    Culture - Urine (11.05.18 @ 23:53)    -  Amikacin: S 16    -  Amoxicillin/Clavulanic Acid: S <=8/4    -  Ampicillin: S <=2 These ampicillin results predict results for amoxicillin    -  Ampicillin/Sulbactam: S <=4/2    -  Aztreonam: S <=4    -  Cefazolin: S <=2 For uncomplicated UTI with K. pneumoniae, E. coli, or P. mirablis: WENCESLAO <=16 is sensitive and WENCESLAO >=32 is resistant. This also predicts results for oral agents cefaclor, cefdinir, cefpodoxime, cefprozil, cefuroxime axetil, cephalexin and locarbef for uncomplicated UTI. Note that some isolates may be susceptible to these agents while testing resistant to cefazolin.    -  Cefepime: S <=2    -  Cefoxitin: S <=4    -  Ceftriaxone: S <=1 Enterobacter, Citrobacter, and Serratia may develop resistance during prolonged therapy    -  Ciprofloxacin: R >2    -  Ertapenem: S <=0.5    -  Gentamicin: S 2    -  Levofloxacin: R >4    -  Meropenem: S <=1    -  Nitrofurantoin: R 64 Should not be used to treat pyelonephritis    -  Piperacillin/Tazobactam: S <=8    -  Tobramycin: S 4    -  Trimethoprim/Sulfamethoxazole: R >2/38    Specimen Source: .Urine Clean Catch (Midstream)    Culture Results:   >100,000 CFU/ml Proteus mirabilis    Organism Identification: Proteus mirabilis    Organism: Proteus mirabilis    Method Type: WENCESLAO                CAPILLARY BLOOD GLUCOSE            RADIOLOGY & ADDITIONAL TESTS:    CXR:    Ct scan chest:    ekg;    echo:

## 2018-11-08 NOTE — PROGRESS NOTE ADULT - SUBJECTIVE AND OBJECTIVE BOX
PGY 2 Note discussed with primary attending    Patient is a 89y old  Female who presents with a chief complaint of dysphagia (08 Nov 2018 14:38)      INTERVAL HPI/OVERNIGHT EVENTS: Patient seen and examined at bed side, peg placed on 11/7/2018, started on tube feeds, offers no new complaints; current symptoms resolving    MEDICATIONS  (STANDING):  ALBUTerol/ipratropium for Nebulization 3 milliLiter(s) Nebulizer every 6 hours  artificial  tears Solution 1 Drop(s) Both EYES three times a day  aspirin  chewable 81 milliGRAM(s) Oral daily  atorvastatin 40 milliGRAM(s) Oral at bedtime  cefTRIAXone   IVPB 1 Gram(s) IV Intermittent every 24 hours  docusate sodium Liquid 50 milliGRAM(s) Oral at bedtime  famotidine   Suspension 40 milliGRAM(s) Oral daily  heparin  Injectable 5000 Unit(s) SubCutaneous every 12 hours  levETIRAcetam  Solution 250 milliGRAM(s) Oral two times a day  lisinopril 5 milliGRAM(s) Oral daily    MEDICATIONS  (PRN):      __________________________________________________  REVIEW OF SYSTEMS: Unable to obtain         Vital Signs Last 24 Hrs  T(C): 36.9 (08 Nov 2018 14:02), Max: 37.4 (07 Nov 2018 21:25)  T(F): 98.5 (08 Nov 2018 14:02), Max: 99.3 (07 Nov 2018 21:25)  HR: 98 (08 Nov 2018 14:02) (88 - 102)  BP: 137/67 (08 Nov 2018 14:02) (118/55 - 137/67)  BP(mean): --  RR: 18 (08 Nov 2018 14:02) (18 - 18)  SpO2: 95% (08 Nov 2018 14:02) (94% - 96%)    ________________________________________________  PHYSICAL EXAM:  GENERAL: NAD  HEENT: Normocephalic;  conjunctivae and sclerae clear; moist mucous membranes;   NECK : supple  CHEST/LUNG: Clear to auscultation bilaterally with good air entry   HEART: S1 S2  regular; no murmurs, gallops or rubs  ABDOMEN: Soft, Nontender, Nondistended; Bowel sounds present  EXTREMITIES: no cyanosis; no edema; no calf tenderness  SKIN: warm and dry; no rash  NERVOUS SYSTEM:  awake and alert   _________________________________________________  LABS:                        12.0   7.1   )-----------( 241      ( 08 Nov 2018 06:58 )             37.1     11-08    144  |  108  |  7   ----------------------------<  90  3.2<L>   |  27  |  0.34<L>    Ca    8.6      08 Nov 2018 06:58  Phos  2.5     11-08  Mg     1.9     11-08          CAPILLARY BLOOD GLUCOSE            RADIOLOGY & ADDITIONAL TESTS:      Consultant(s) Notes Reviewed:   YES      Plan of care was discussed with patient and /or primary care giver; all questions and concerns were addressed and care was aligned with patient's wishes.

## 2018-11-08 NOTE — DIETITIAN INITIAL EVALUATION ADULT. - PERTINENT LABORATORY DATA
11-08 Na144 mmol/L Glu 90 mg/dL K+ 3.2 mmol/L<L> Cr  0.34 mg/dL<L> BUN 7 mg/dL 11-08 Phos 2.5 mg/dL 11-05 Alb 2.5 g/dL<L> 11-06 [id number] 5.3 % 10-31 Chol 156 mg/dL LDL 85 mg/dL HDL 53 mg/dL Trig 88 mg/dL

## 2018-11-08 NOTE — PROGRESS NOTE ADULT - SUBJECTIVE AND OBJECTIVE BOX
[   ] ICU                                          [   ] CCU                                      [ X  ] Medical Floor    Patient is comfortable. No new complaints reported, No abdominal pain, N/V, hematemesis, hematochezia, melena, fever, chills, chest pain, SOB, cough or diarrhea reported.    VITALS  Vital Signs Last 24 Hrs  T(C): 36.9 (08 Nov 2018 14:02), Max: 37.4 (07 Nov 2018 21:25)  T(F): 98.5 (08 Nov 2018 14:02), Max: 99.3 (07 Nov 2018 21:25)  HR: 98 (08 Nov 2018 14:02) (88 - 102)  BP: 137/67 (08 Nov 2018 14:02) (118/55 - 137/67)   RR: 18 (08 Nov 2018 14:02) (18 - 18)  SpO2: 95% (08 Nov 2018 14:02) (94% - 96%)       MEDICATIONS  (STANDING):  ALBUTerol/ipratropium for Nebulization 3 milliLiter(s) Nebulizer every 6 hours  artificial  tears Solution 1 Drop(s) Both EYES three times a day  aspirin  chewable 81 milliGRAM(s) Oral daily  cefTRIAXone   IVPB 1 Gram(s) IV Intermittent every 24 hours  heparin  Injectable 5000 Unit(s) SubCutaneous every 12 hours  levETIRAcetam  IVPB 250 milliGRAM(s) IV Intermittent every 12 hours    MEDICATIONS  (PRN):                            12.0   7.1   )-----------( 241      ( 08 Nov 2018 06:58 )             37.1       11-08    144  |  108  |  7   ----------------------------<  90  3.2<L>   |  27  |  0.34<L>    Ca    8.6      08 Nov 2018 06:58  Phos  2.5     11-08  Mg     1.9     11-08

## 2018-11-08 NOTE — PROGRESS NOTE ADULT - SUBJECTIVE AND OBJECTIVE BOX
Meds:  cefTRIAXone   IVPB 1 Gram(s) IV Intermittent every 24 hours    Allergies:  Allergies    Allergy Status Unknown    Intolerances        ROS  [ x ] UNABLE TO ELICIT    General:  [  ] None  [  ] Fever  [  ] Chills  [  ] Malaise    Skin:  [  ] None [  ] Rash  [  ] Wound  [  ] Ulcer    HEENT:  [  ] None  [  ] Sore Throat  [  ] Nasal congestion/ runny nose  [  ] Photophobia [  ] Neck pain      Chest:  [  ] None   [  ] SOB  [  ] Cough  [  ] None    Cardiovascular:   [  ] None  [  ] CP  [  ] Palpitation    Gastrointestinal:  [  ] None  [  ] Abd pain   [  ] Nausea    [  ] Vomiting   [  ] Diarrhea	     Genitourinary:  [  ] None [  ] Polyuria   [  ] Urgency  [  ] Frequency  [  ] Dysuria    [  ]  Hematuria       Musculoskeletal:  [  ] None [  ] Back Pain	[  ] Body aches  [  ] Joint pain    Neurological: [  ] None [  ]Dizziness  [  ]Visual Disturbance  [  ]Headaches   [  ] Weakness          PHYSICAL EXAM:  Vital Signs Last 24 Hrs  T(C): 37.1 (08 Nov 2018 05:07), Max: 37.4 (07 Nov 2018 21:25)  T(F): 98.7 (08 Nov 2018 05:07), Max: 99.3 (07 Nov 2018 21:25)  HR: 88 (08 Nov 2018 05:07) (75 - 102)  BP: 125/50 (08 Nov 2018 05:07) (118/55 - 134/68)  BP(mean): --  RR: 18 (08 Nov 2018 05:07) (18 - 18)  SpO2: 96% (08 Nov 2018 05:07) (94% - 100%)      HEENT: [  ] Wnl  [ x ] Left side facial droop    Neck:  [ x ] Supple  [  ]Lymphadenopathy  [  ] No JVD   [  ] JVD  [  ] Masses   [  ] WNL    CHEST/Respiratory:  [ x ]Clear to auscultation  [  ] Rales   [  ] Rhonchi   [  ] Wheezing     [  ] Chest Tenderness      Cardiovascular:  [ x ] Reg S1 S2   [  ] Irreg S1 S2   [ x ]No Murmur  [  ] +ve Murmurs  [  ]Systolic [  ]Diastolic      Abdomen:  [ x ] Soft  [ x ] No tendrerness  [  ] Tenderness  [  ] Organomegaly  [  ] ABD Distention  [  ] Rigidity                       [ x ] No Regidity                       [ x ] No Rebound Tenderness  [  ] No Guarding Rigidity  [  ] Rebound Tenderness[  ] Guarding Rigidity                          [ x ]  +ve Bowel Sounds  [  ] Decreased Bowel Sounds    [  ] Absent Bowel Sounds                            Extremities: [ x ] No edema [  ] Edema  [  ] Clubbing   [  ] Cyanosis                         [ x ] No Tender Calf muscles  [  ] Tender Calf muscles                        [ x ] Palpable peripheral pulses    Neurological: [  ] Awake  [  ] Alert  [ x ] Oriented  x  1                           [  ] Confused  [ x ] Drowzy  [  ] repond to painful stimuli  [  ] Unresponsive    Skin:  [ x ] Intact [  ] Redness [  ] Thrombophlebitis  [  ] Rashes  [  ] Dry  [  ] Ulcers    Ortho:  [  ] Joint Swelling  [  ] Joint erythema [  ] Joint tenderness                [  ] Increased temp. to touch  [  ] DJD [ x ] Contracted right hand.          LABS/DIAGNOSTIC TESTS                          13.0   6.5   )-----------( 243      ( 06 Nov 2018 08:11 )             41.2         11-06    142  |  107  |  15  ----------------------------<  83  3.6   |  29  |  0.47<L>    Ca    8.7      06 Nov 2018 08:11  Mg     2.2     11-06    TPro  6.8  /  Alb  2.5<L>  /  TBili  1.1  /  DBili  x   /  AST  147<H>  /  ALT  78<H>  /  AlkPhos  158<H>  11-05      LIVER FUNCTIONS - ( 05 Nov 2018 16:36 )  Alb: 2.5 g/dL / Pro: 6.8 g/dL / ALK PHOS: 158 U/L / ALT: 78 U/L DA / AST: 147 U/L / GGT: x               CULTURES:     Culture - Urine (11.05.18 @ 23:53)    -  Amikacin: S 16    -  Amoxicillin/Clavulanic Acid: S <=8/4    -  Ampicillin: S <=2 These ampicillin results predict results for amoxicillin    -  Ampicillin/Sulbactam: S <=4/2    -  Aztreonam: S <=4    -  Cefazolin: S <=2 For uncomplicated UTI with K. pneumoniae, E. coli, or P. mirablis: WENCESLAO <=16 is sensitive and WENCESLAO >=32 is resistant. This also predicts results for oral agents cefaclor, cefdinir, cefpodoxime, cefprozil, cefuroxime axetil, cephalexin and locarbef for uncomplicated UTI. Note that some isolates may be susceptible to these agents while testing resistant to cefazolin.    -  Cefepime: S <=2    -  Cefoxitin: S <=4    -  Ceftriaxone: S <=1 Enterobacter, Citrobacter, and Serratia may develop resistance during prolonged therapy    -  Ciprofloxacin: R >2    -  Ertapenem: S <=0.5    -  Gentamicin: S 2    -  Levofloxacin: R >4    -  Meropenem: S <=1    -  Nitrofurantoin: R 64 Should not be used to treat pyelonephritis    -  Piperacillin/Tazobactam: S <=8    -  Tobramycin: S 4    -  Trimethoprim/Sulfamethoxazole: R >2/38    Specimen Source: .Urine Clean Catch (Midstream)    Culture Results:   >100,000 CFU/ml Proteus mirabilis    Organism Identification: Proteus mirabilis    Organism: Proteus mirabilis    Method Type: WENCESLAO        RADIOLOGY:    CXR:    EXAM:  XR CHEST PORTABLE IMMED 1V                            PROCEDURE DATE:  11/05/2018          INTERPRETATION:  EXAM: PORTABLE CHEST.     CLINICAL INDICATION: PEG tube replacement. History of dysphagia.     TECHNIQUE: Semi supine AP view.     PRIOR EXAM: 10/30/2018.     FINDINGS:      Visualized lung fields are unremarkable. Magnified cardiac and   mediastinal silhouette is stable. There is no significant bony   abnormality.      IMPRESSION:     No evidence of any acute lung disease.      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 Family requesting PEG tube placement. Patient was recently discharged to NH from [facility] with diagnosis of R MCA CVA. Family acknowledges risks and benefits of PEG tube placement. Patient is DNR/DNI as per family wishes.  U/A suggested for UTI and urine was cloudy and patient was started on IV Rocephin.  Urine culture grew Proteus mirabilis, and patient is planed to have PEG tube today 11/7/18.  Urine culture grew E. Coli sensitive to Rocephin.    Problem/Recommendation - 1:  Problem: UTI (urinary tract infection).   Recommendation:   1- UA & CS are +VE.  2- Blood culture.  3- Us catheter (chronic urine retention).  4- Continue Rocephin 1 gm IVPB q day, to change upon discharge to Ceftin PO/  mg q 12 till 11/11/18.  5- IV and PO hydration.  6- CBC and BMP follow up.    Problem/Recommendation - 2:  ·  Problem: COPD (chronic obstructive pulmonary disease).    Recommendation:   1- Bronchodilators.  2- O2 as needed.  3- Pulmonary evaluation and management.  4- Steroids as per primary, and pulmonary team.     Problem/Recommendation - 3:  ·  Problem: HTN (hypertension).    Recommendation:   1- Monitor Blood pressure closely.  2- Blood pressure control.  3- BP. meds as per cardiology and primary care team.     Discussed with medical resident.

## 2018-11-08 NOTE — PROGRESS NOTE ADULT - SUBJECTIVE AND OBJECTIVE BOX
Patient is a 89y old  Female who presents with a chief complaint of dysphagia (08 Nov 2018 06:45)    pt seen in icu [  ], reg med floor [ x  ], bed [x  ], chair at bedside [   ], awake and responsive [ x ], lethargic [  ],  nad [ x ]    patrick [x  ], peg [x  ],         Allergies    Allergy Status Unknown        Vitals    T(F): 98.7 (11-08-18 @ 05:07), Max: 99.3 (11-07-18 @ 21:25)  HR: 88 (11-08-18 @ 05:07) (75 - 102)  BP: 125/50 (11-08-18 @ 05:07) (118/55 - 134/68)  RR: 18 (11-08-18 @ 05:07) (18 - 18)  SpO2: 96% (11-08-18 @ 05:07) (94% - 100%)  Wt(kg): --  CAPILLARY BLOOD GLUCOSE      POCT Blood Glucose.: 98 mg/dL (06 Nov 2018 21:29)      Labs                          12.0   7.1   )-----------( 241      ( 08 Nov 2018 06:58 )             37.1       11-08    144  |  108  |  7   ----------------------------<  90  3.2<L>   |  27  |  0.34<L>    Ca    8.6      08 Nov 2018 06:58  Phos  2.5     11-08  Mg     1.9     11-08              .Blood Blood  11-06 @ 11:13   No growth to date.  --  --      .Urine Clean Catch (Midstream)  11-05 @ 23:53   >100,000 CFU/ml Proteus mirabilis  --  Proteus mirabilis          Radiology Results      Meds    MEDICATIONS  (STANDING):  ALBUTerol/ipratropium for Nebulization 3 milliLiter(s) Nebulizer every 6 hours  artificial  tears Solution 1 Drop(s) Both EYES three times a day  aspirin Suppository 300 milliGRAM(s) Rectal daily  cefTRIAXone   IVPB 1 Gram(s) IV Intermittent every 24 hours  dextrose 5% + sodium chloride 0.9%. 1000 milliLiter(s) (75 mL/Hr) IV Continuous <Continuous>  heparin  Injectable 5000 Unit(s) SubCutaneous every 12 hours  levETIRAcetam  IVPB 250 milliGRAM(s) IV Intermittent every 12 hours      MEDICATIONS  (PRN):      Physical Exam    Neuro :  b/l weakness  Respiratory: CTA B/L  CV: RRR, S1S2, no murmurs,   Abdominal: Soft, NT, ND +BS, peg inplace  Extremities: No edema, + peripheral pulses, right wrist contracted    ASSESSMENT    Dysphagia  uti possible 2nd to chronic patrick  h/o COPD (chronic obstructive pulmonary disease)  HTN (hypertension)  CVA (cerebral vascular accident)  History of cataract surgery  chronic patrick  No significant past surgical history        PLAN      gi f/u  s/p peg placement   peg may be used after 3:14 pm for nutrition and meds  palliative eval noted  id f/u noted  ucx and sens with proteus  Continue Rocephin 1 gm IVPB q day, to change upon discharge to Ceftin PO/  mg q 12 till 11/11/18.  pulm f/u   cont bronchodilators  cont current meds  d/c planning

## 2018-11-08 NOTE — DIETITIAN INITIAL EVALUATION ADULT. - MD RECOMMEND
Tube feeding : Goal: Jevity 1.5 @ 55ml/h x 16h ( 880ml, 1360kcal, 56g protein, 669ml water daily at goal)  MD to adjust free water as needed, as medically feasible

## 2018-11-08 NOTE — PROGRESS NOTE ADULT - SUBJECTIVE AND OBJECTIVE BOX
Patient is a 89y old  Female who presents with a chief complaint of dysphagia (08 Nov 2018 09:01)    Awake, alert, comfortable in bed in NAD. Awaiting peg placement today.    INTERVAL HPI/OVERNIGHT EVENTS:      VITAL SIGNS:  T(F): 98.7 (11-08-18 @ 05:07)  HR: 88 (11-08-18 @ 05:07)  BP: 125/50 (11-08-18 @ 05:07)  RR: 18 (11-08-18 @ 05:07)  SpO2: 96% (11-08-18 @ 05:07)  Wt(kg): --  I&O's Detail    07 Nov 2018 07:01  -  08 Nov 2018 07:00  --------------------------------------------------------  IN:  Total IN: 0 mL    OUT:    Indwelling Catheter - Urethral: 900 mL  Total OUT: 900 mL    Total NET: -900 mL              REVIEW OF SYSTEMS:    CONSTITUTIONAL:  No fevers, chills, sweats    HEENT:  Eyes:  No diplopia or blurred vision. ENT:  No earache, sore throat or runny nose.    CARDIOVASCULAR:  No pressure, squeezing, tightness, or heaviness about the chest; no palpitations.    RESPIRATORY:  Per HPI    GASTROINTESTINAL:  No abdominal pain, nausea, vomiting or diarrhea.    GENITOURINARY:  No dysuria, frequency or urgency.    NEUROLOGIC:  No paresthesias, fasciculations, seizures or weakness.    PSYCHIATRIC:  No disorder of thought or mood.      PHYSICAL EXAM:    Constitutional: Well developed and nourished  Eyes:Perrla  ENMT: normal  Neck:supple  Respiratory: good air entry  Cardiovascular: S1 S2 regular  Gastrointestinal: Soft, Non tender  Extremities: No edema  Vascular:normal  Neurological:Awake, alert,Ox3  Musculoskeletal:Normal      MEDICATIONS  (STANDING):  ALBUTerol/ipratropium for Nebulization 3 milliLiter(s) Nebulizer every 6 hours  artificial  tears Solution 1 Drop(s) Both EYES three times a day  aspirin Suppository 300 milliGRAM(s) Rectal daily  cefTRIAXone   IVPB 1 Gram(s) IV Intermittent every 24 hours  heparin  Injectable 5000 Unit(s) SubCutaneous every 12 hours  levETIRAcetam  IVPB 250 milliGRAM(s) IV Intermittent every 12 hours    MEDICATIONS  (PRN):      Allergies    Allergy Status Unknown    Intolerances        LABS:                        12.0   7.1   )-----------( 241      ( 08 Nov 2018 06:58 )             37.1     11-08    144  |  108  |  7   ----------------------------<  90  3.2<L>   |  27  |  0.34<L>    Ca    8.6      08 Nov 2018 06:58  Phos  2.5     11-08  Mg     1.9     11-08                CAPILLARY BLOOD GLUCOSE            RADIOLOGY & ADDITIONAL TESTS:    CXR:  < from: Xray Chest 1 View-PORTABLE IMMEDIATE (11.05.18 @ 18:30) >      IMPRESSION:     No evidence of any acute lung disease.      < end of copied text >    Ct scan chest:    ekg;    echo:  < from: Transthoracic Echocardiogram (11.01.18 @ 07:13) >  CONCLUSIONS:  TECHNICALLY VERY DIFFICULT STUDY, POOR ACOUSTIC WINDOWS    1. Mitral annular calcification, and calcified mitral  leaflets with normal diastolic opening. Trace mitral  regurgitation.  2.  Mild aortic regurgitation.  3. Normal left atrium.  LA volume index = 25 cc/m2. The  intra-atrial septum appears hypermobile and maybe  aneurysmal.  4. Increased relative wall thickness with normal left  ventricular (LV) mass index, consistent with concentric LV  remodeling.  5. Endocardium not well visualized; grossly normal left  ventricular systolic function. Segmental wall motion could  not be assessed.    < end of copied text >

## 2018-11-09 VITALS
DIASTOLIC BLOOD PRESSURE: 72 MMHG | SYSTOLIC BLOOD PRESSURE: 126 MMHG | RESPIRATION RATE: 18 BRPM | HEART RATE: 108 BPM | TEMPERATURE: 99 F | OXYGEN SATURATION: 98 %

## 2018-11-09 LAB
GLUCOSE BLDC GLUCOMTR-MCNC: 106 MG/DL — HIGH (ref 70–99)
GLUCOSE BLDC GLUCOMTR-MCNC: 118 MG/DL — HIGH (ref 70–99)

## 2018-11-09 PROCEDURE — 87086 URINE CULTURE/COLONY COUNT: CPT

## 2018-11-09 PROCEDURE — 87186 SC STD MICRODIL/AGAR DIL: CPT

## 2018-11-09 PROCEDURE — 87040 BLOOD CULTURE FOR BACTERIA: CPT

## 2018-11-09 PROCEDURE — 82009 KETONE BODYS QUAL: CPT

## 2018-11-09 PROCEDURE — 83735 ASSAY OF MAGNESIUM: CPT

## 2018-11-09 PROCEDURE — 84443 ASSAY THYROID STIM HORMONE: CPT

## 2018-11-09 PROCEDURE — 86901 BLOOD TYPING SEROLOGIC RH(D): CPT

## 2018-11-09 PROCEDURE — 82746 ASSAY OF FOLIC ACID SERUM: CPT

## 2018-11-09 PROCEDURE — 82607 VITAMIN B-12: CPT

## 2018-11-09 PROCEDURE — 71045 X-RAY EXAM CHEST 1 VIEW: CPT

## 2018-11-09 PROCEDURE — 80053 COMPREHEN METABOLIC PANEL: CPT

## 2018-11-09 PROCEDURE — 86900 BLOOD TYPING SEROLOGIC ABO: CPT

## 2018-11-09 PROCEDURE — 81001 URINALYSIS AUTO W/SCOPE: CPT

## 2018-11-09 PROCEDURE — 82962 GLUCOSE BLOOD TEST: CPT

## 2018-11-09 PROCEDURE — 84100 ASSAY OF PHOSPHORUS: CPT

## 2018-11-09 PROCEDURE — 93005 ELECTROCARDIOGRAM TRACING: CPT

## 2018-11-09 PROCEDURE — 80048 BASIC METABOLIC PNL TOTAL CA: CPT

## 2018-11-09 PROCEDURE — 94640 AIRWAY INHALATION TREATMENT: CPT

## 2018-11-09 PROCEDURE — 83036 HEMOGLOBIN GLYCOSYLATED A1C: CPT

## 2018-11-09 PROCEDURE — 85610 PROTHROMBIN TIME: CPT

## 2018-11-09 PROCEDURE — 86850 RBC ANTIBODY SCREEN: CPT

## 2018-11-09 PROCEDURE — 85027 COMPLETE CBC AUTOMATED: CPT

## 2018-11-09 PROCEDURE — 99285 EMERGENCY DEPT VISIT HI MDM: CPT | Mod: 25

## 2018-11-09 PROCEDURE — 85730 THROMBOPLASTIN TIME PARTIAL: CPT

## 2018-11-09 PROCEDURE — L8699: CPT

## 2018-11-09 RX ORDER — LISINOPRIL 2.5 MG/1
1 TABLET ORAL
Qty: 0 | Refills: 0 | COMMUNITY
Start: 2018-11-09

## 2018-11-09 RX ORDER — IPRATROPIUM/ALBUTEROL SULFATE 18-103MCG
3 AEROSOL WITH ADAPTER (GRAM) INHALATION
Qty: 0 | Refills: 0 | COMMUNITY
Start: 2018-11-09

## 2018-11-09 RX ORDER — DOCUSATE SODIUM 100 MG
5 CAPSULE ORAL
Qty: 0 | Refills: 0 | COMMUNITY
Start: 2018-11-09

## 2018-11-09 RX ORDER — ASPIRIN/CALCIUM CARB/MAGNESIUM 324 MG
1 TABLET ORAL
Qty: 0 | Refills: 0 | COMMUNITY
Start: 2018-11-09

## 2018-11-09 RX ORDER — FAMOTIDINE 10 MG/ML
1 INJECTION INTRAVENOUS
Qty: 0 | Refills: 0 | COMMUNITY
Start: 2018-11-09

## 2018-11-09 RX ORDER — LEVETIRACETAM 250 MG/1
2.5 TABLET, FILM COATED ORAL
Qty: 0 | Refills: 0 | COMMUNITY
Start: 2018-11-09

## 2018-11-09 RX ORDER — ATORVASTATIN CALCIUM 80 MG/1
1 TABLET, FILM COATED ORAL
Qty: 0 | Refills: 0 | COMMUNITY
Start: 2018-11-09

## 2018-11-09 RX ADMIN — Medication 3 MILLILITER(S): at 15:35

## 2018-11-09 RX ADMIN — HEPARIN SODIUM 5000 UNIT(S): 5000 INJECTION INTRAVENOUS; SUBCUTANEOUS at 17:42

## 2018-11-09 RX ADMIN — LEVETIRACETAM 250 MILLIGRAM(S): 250 TABLET, FILM COATED ORAL at 17:42

## 2018-11-09 RX ADMIN — FAMOTIDINE 40 MILLIGRAM(S): 10 INJECTION INTRAVENOUS at 14:39

## 2018-11-09 RX ADMIN — Medication 3 MILLILITER(S): at 02:11

## 2018-11-09 RX ADMIN — Medication 81 MILLIGRAM(S): at 14:30

## 2018-11-09 RX ADMIN — Medication 3 MILLILITER(S): at 09:36

## 2018-11-09 RX ADMIN — Medication 1 DROP(S): at 14:31

## 2018-11-09 RX ADMIN — HEPARIN SODIUM 5000 UNIT(S): 5000 INJECTION INTRAVENOUS; SUBCUTANEOUS at 05:32

## 2018-11-09 RX ADMIN — Medication 1 DROP(S): at 05:32

## 2018-11-09 RX ADMIN — LEVETIRACETAM 250 MILLIGRAM(S): 250 TABLET, FILM COATED ORAL at 05:32

## 2018-11-09 NOTE — PROGRESS NOTE ADULT - SUBJECTIVE AND OBJECTIVE BOX
Meds:  cefTRIAXone   IVPB 1 Gram(s) IV Intermittent every 24 hours    Allergies:  Allergies    Allergy Status Unknown    Intolerances        ROS  [ x ] UNABLE TO ELICIT    General:  [  ] None  [  ] Fever  [  ] Chills  [  ] Malaise    Skin:  [  ] None [  ] Rash  [  ] Wound  [  ] Ulcer    HEENT:  [  ] None  [  ] Sore Throat  [  ] Nasal congestion/ runny nose  [  ] Photophobia [  ] Neck pain      Chest:  [  ] None   [  ] SOB  [  ] Cough  [  ] None    Cardiovascular:   [  ] None  [  ] CP  [  ] Palpitation    Gastrointestinal:  [  ] None  [  ] Abd pain   [  ] Nausea    [  ] Vomiting   [  ] Diarrhea	     Genitourinary:  [  ] None [  ] Polyuria   [  ] Urgency  [  ] Frequency  [  ] Dysuria    [  ]  Hematuria       Musculoskeletal:  [  ] None [  ] Back Pain	[  ] Body aches  [  ] Joint pain    Neurological: [  ] None [  ]Dizziness  [  ]Visual Disturbance  [  ]Headaches   [  ] Weakness          PHYSICAL EXAM:  Vital Signs Last 24 Hrs  T(C): 36.8 (09 Nov 2018 04:54), Max: 37.2 (08 Nov 2018 21:52)  T(F): 98.3 (09 Nov 2018 04:54), Max: 98.9 (08 Nov 2018 21:52)  HR: 96 (09 Nov 2018 04:54) (90 - 98)  BP: 102/61 (09 Nov 2018 04:54) (102/61 - 139/76)  BP(mean): --  RR: 18 (09 Nov 2018 04:54) (18 - 18)  SpO2: 96% (09 Nov 2018 04:54) (95% - 98%)      HEENT: [  ] Wnl  [ x ] Left side facial droop    Neck:  [ x ] Supple  [  ]Lymphadenopathy  [  ] No JVD   [  ] JVD  [  ] Masses   [  ] WNL    CHEST/Respiratory:  [ x ]Clear to auscultation  [  ] Rales   [  ] Rhonchi   [  ] Wheezing     [  ] Chest Tenderness      Cardiovascular:  [ x ] Reg S1 S2   [  ] Irreg S1 S2   [ x ]No Murmur  [  ] +ve Murmurs  [  ]Systolic [  ]Diastolic      Abdomen:  [ x ] Soft  [ x ] No tendrerness  [  ] Tenderness  [  ] Organomegaly  [  ] ABD Distention  [  ] Rigidity                       [ x ] No Regidity                       [ x ] No Rebound Tenderness  [  ] No Guarding Rigidity  [  ] Rebound Tenderness[  ] Guarding Rigidity                          [ x ]  +ve Bowel Sounds  [  ] Decreased Bowel Sounds    [ x ] PEG in place and feeding in progress.                           Extremities: [ x ] No edema [  ] Edema  [  ] Clubbing   [  ] Cyanosis                         [ x ] No Tender Calf muscles  [  ] Tender Calf muscles                        [ x ] Palpable peripheral pulses    Neurological: [  ] Awake  [  ] Alert  [ x ] Oriented  x  1                           [  ] Confused  [ x ] Drowzy  [  ] repond to painful stimuli  [  ] Unresponsive    Skin:  [ x ] Intact [  ] Redness [  ] Thrombophlebitis  [  ] Rashes  [  ] Dry  [  ] Ulcers    Ortho:  [  ] Joint Swelling  [  ] Joint erythema [  ] Joint tenderness                [  ] Increased temp. to touch  [  ] DJD [ x ] Contracted right hand.          LABS/DIAGNOSTIC TESTS                        12.0   7.1   )-----------( 241      ( 08 Nov 2018 06:58 )             37.1   11-08    144  |  108  |  7   ----------------------------<  90  3.2<L>   |  27  |  0.34<L>    Ca    8.6      08 Nov 2018 06:58  Phos  2.5     11-08  Mg     1.9     11-08          CULTURES:     Culture - Urine (11.05.18 @ 23:53)    -  Amikacin: S 16    -  Amoxicillin/Clavulanic Acid: S <=8/4    -  Ampicillin: S <=2 These ampicillin results predict results for amoxicillin    -  Ampicillin/Sulbactam: S <=4/2    -  Aztreonam: S <=4    -  Cefazolin: S <=2 For uncomplicated UTI with K. pneumoniae, E. coli, or P. mirabilis WENCESLAO <=16 is sensitive and WENCESLAO >=32 is resistant. This also predicts results for oral agents cefaclor, cefdinir, cefpodoxime, cefprozil, cefuroxime axetil, cephalexin and locarbef for uncomplicated UTI. Note that some isolates may be susceptible to these agents while testing resistant to cefazolin.    -  Cefepime: S <=2    -  Cefoxitin: S <=4    -  Ceftriaxone: S <=1 Enterobacter, Citrobacter, and Serratia may develop resistance during prolonged therapy    -  Ciprofloxacin: R >2    -  Ertapenem: S <=0.5    -  Gentamicin: S 2    -  Levofloxacin: R >4    -  Meropenem: S <=1    -  Nitrofurantoin: R 64 Should not be used to treat pyelonephritis    -  Piperacillin/Tazobactam: S <=8    -  Tobramycin: S 4    -  Trimethoprim/Sulfamethoxazole: R >2/38    Specimen Source: .Urine Clean Catch (Midstream)    Culture Results:   >100,000 CFU/ml Proteus mirabilis    Organism Identification: Proteus mirabilis    Organism: Proteus mirabilis    Method Type: WENCESLAO        RADIOLOGY:    CXR:    EXAM:  XR CHEST PORTABLE IMMED 1V                            PROCEDURE DATE:  11/05/2018          INTERPRETATION:  EXAM: PORTABLE CHEST.     CLINICAL INDICATION: PEG tube replacement. History of dysphagia.     TECHNIQUE: Semi supine AP view.     PRIOR EXAM: 10/30/2018.     FINDINGS:      Visualized lung fields are unremarkable. Magnified cardiac and   mediastinal silhouette is stable. There is no significant bony   abnormality.      IMPRESSION:     No evidence of any acute lung disease.      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 Family requesting PEG tube placement. Patient was recently discharged to NH from [facility] with diagnosis of R MCA CVA. Family acknowledges risks and benefits of PEG tube placement. Patient is DNR/DNI as per family wishes.  U/A suggested for UTI and urine was cloudy and patient was started on IV Rocephin.  Urine culture grew Proteus mirabilis, and patient is planed to have PEG tube today 11/7/18.  Urine culture grew E. Coli sensitive to Rocephin.  11/9/18 Patient is afebrile and tolerating PEG feeding.    Problem/Recommendation - 1:  Problem: UTI (urinary tract infection).   Recommendation:   1- UA & CS are +VE.  2- Blood culture.  3- Us catheter (chronic urine retention).  4- Discontinue Rocephin as patient received 5 days and Us catheter is chronic and patient is asymptomatic.  5- Fluid and electrolytes management.   6- CBC and BMP follow up.    Problem/Recommendation - 2:  ·  Problem: COPD (chronic obstructive pulmonary disease).    Recommendation:   1- Bronchodilators.  2- O2 as needed.  3- Pulmonary evaluation and management.  4- Steroids as per primary, and pulmonary team.     Problem/Recommendation - 3:  ·  Problem: HTN (hypertension).    Recommendation:   1- Monitor Blood pressure closely.  2- Blood pressure control.  3- BP. meds as per cardiology and primary care team.     Discussed with medical resident.

## 2018-11-09 NOTE — PROGRESS NOTE ADULT - PROBLEM SELECTOR PLAN 1
supportive care  DVT PPX  Levetiracetam IV  ASA. supportive care  DVT PPX  Levetiracetam IV  ASA.  Peg feeding

## 2018-11-09 NOTE — DISCHARGE NOTE ADULT - PLAN OF CARE
To treat the underlying condition You presented to hospital with difficulty swallowing, Palliative care consulted and family agree for Peg tube placement for feeding. Dr Sheryl MEHTA placed the peg tube. Currently patient tolerating tube feeds well. continue with Jevity tube feeds, peg care and follow up with primary care physician in 2 weeks stable. continue with aspirin and statin.   maintain aspiration and fall precautions stable, continue with bronchodilator patient has chronic patrick catheter. treated with Rocephin for 5 days, no further Abx warranted continue with home medications

## 2018-11-09 NOTE — DISCHARGE NOTE ADULT - PATIENT PORTAL LINK FT
You can access the [facility] Patient Portal, offered by [facility], by registering with the following website: [email or web address]

## 2018-11-09 NOTE — PROGRESS NOTE ADULT - PROBLEM SELECTOR PLAN 4
S/p peg placement  Start peg feeding  Palliative care consult done. S/p peg placement  cont peg feeding  Palliative care consult done.

## 2018-11-09 NOTE — DISCHARGE NOTE ADULT - SECONDARY DIAGNOSIS.
CVA (cerebral vascular accident) COPD (chronic obstructive pulmonary disease) UTI (urinary tract infection) HTN (hypertension)

## 2018-11-09 NOTE — PROGRESS NOTE ADULT - PROBLEM SELECTOR PROBLEM 4
Dysphagia
HTN (hypertension)

## 2018-11-09 NOTE — PROGRESS NOTE ADULT - PROBLEM SELECTOR PROBLEM 5
UTI (urinary tract infection)
CVA (cerebral vascular accident)

## 2018-11-09 NOTE — PROGRESS NOTE ADULT - PROBLEM SELECTOR PLAN 3
Bronchodilators prn  pulmonary toilet.
Bronchodilators prn  pulmonary toilet
Bronchodilators prn  pulmonary toilet.
Bronchodilators prn  pulmonary toilet.
Not in exacerbation  c/w [id number]'s  Pulm eval noted

## 2018-11-09 NOTE — PROGRESS NOTE ADULT - PROBLEM SELECTOR PLAN 5
panculture  antibiotics.   ID follow up
antibiotics  Urine culture grew proteus mirabilis
antibiotics  Urine culture grew proteus mirabilis.
check pending cultutes  antibiotics
check pending cultutes  antibiotics.
panculture  antibiotics
c/w with aspirin and statin
will c/w rectal ASA and hold off from Lipitor given NPO status
will c/w rectal ASA and hold off from Lipitor given NPO status

## 2018-11-09 NOTE — PROGRESS NOTE ADULT - PROBLEM SELECTOR PLAN 2
monitor BP  cont meds.
monitor BP  cont meds
monitor BP  cont meds.
monitor BP  cont meds.
Patient discharged to NH w/ patrick   c/w Rocephin 1g q 24hrs  blood cs neg   urine Cx + proteus   pending senstivity   Dr Hernando corona noted
Patient discharged to NH w/ patrick   c/w Rocephin 1g q 24hrs  blood cs neg   urine Cx + proteus pan sensitive change upon discharge to Ceftin PO/  mg q 12 till 11/11/18.  Dr Hernando corona noted
Patient discharged to NH w/ patrick   c/w Rocephin 1g q 24hrs  f/up BCx, UCx

## 2018-11-09 NOTE — PROGRESS NOTE ADULT - SUBJECTIVE AND OBJECTIVE BOX
Patient is a 89y old  Female who presents with a chief complaint of dysphagia (09 Nov 2018 08:40)    Awake, alert, lying in bed in NAD. S/p peg placement.  INTERVAL HPI/OVERNIGHT EVENTS:      VITAL SIGNS:  T(F): 98.3 (11-09-18 @ 04:54)  HR: 96 (11-09-18 @ 04:54)  BP: 102/61 (11-09-18 @ 04:54)  RR: 18 (11-09-18 @ 04:54)  SpO2: 96% (11-09-18 @ 04:54)  Wt(kg): --  I&O's Detail    08 Nov 2018 07:01  -  09 Nov 2018 07:00  --------------------------------------------------------  IN:  Total IN: 0 mL    OUT:    Indwelling Catheter - Urethral: 350 mL  Total OUT: 350 mL    Total NET: -350 mL              REVIEW OF SYSTEMS:    CONSTITUTIONAL:  No fevers, chills, sweats    HEENT:  Eyes:  No diplopia or blurred vision. ENT:  No earache, sore throat or runny nose.    CARDIOVASCULAR:  No pressure, squeezing, tightness, or heaviness about the chest; no palpitations.    RESPIRATORY:  Per HPI    GASTROINTESTINAL:  No abdominal pain, nausea, vomiting or diarrhea.    GENITOURINARY:  No dysuria, frequency or urgency.    NEUROLOGIC:  No paresthesias, fasciculations, seizures or weakness.+ weakness.+facial droop    PSYCHIATRIC:  No disorder of thought or mood.      PHYSICAL EXAM:    Constitutional: Well developed and nourished  Eyes:Perrla  ENMT: normal  Neck:supple  Respiratory: good air entry  Cardiovascular: S1 S2 regular  Gastrointestinal: Soft, Non tender  Extremities: No edema  Vascular:normal  Neurological:Awake, alert,Ox3  Musculoskeletal:Normal      MEDICATIONS  (STANDING):  ALBUTerol/ipratropium for Nebulization 3 milliLiter(s) Nebulizer every 6 hours  artificial  tears Solution 1 Drop(s) Both EYES three times a day  aspirin  chewable 81 milliGRAM(s) Oral daily  atorvastatin 40 milliGRAM(s) Oral at bedtime  cefTRIAXone   IVPB 1 Gram(s) IV Intermittent every 24 hours  docusate sodium Liquid 50 milliGRAM(s) Oral at bedtime  famotidine    Tablet 40 milliGRAM(s) Oral daily  heparin  Injectable 5000 Unit(s) SubCutaneous every 12 hours  levETIRAcetam  Solution 250 milliGRAM(s) Oral two times a day  lisinopril 5 milliGRAM(s) Oral daily    MEDICATIONS  (PRN):      Allergies    Allergy Status Unknown    Intolerances        LABS:                        12.0   7.1   )-----------( 241      ( 08 Nov 2018 06:58 )             37.1     11-08    144  |  108  |  7   ----------------------------<  90  3.2<L>   |  27  |  0.34<L>    Ca    8.6      08 Nov 2018 06:58  Phos  2.5     11-08  Mg     1.9     11-08        Culture - Blood (11.06.18 @ 11:13)    Specimen Source: .Blood Blood    Culture Results:   No growth to date.            CAPILLARY BLOOD GLUCOSE      POCT Blood Glucose.: 106 mg/dL (09 Nov 2018 07:47)        RADIOLOGY & ADDITIONAL TESTS:    CXR:  < from: Xray Chest 1 View-PORTABLE IMMEDIATE (11.05.18 @ 18:30) >      IMPRESSION:     No evidence of any acute lung disease.    < end of copied text >    Ct scan chest:    ekg;    echo: Patient is a 89y old  Female who presents with a chief complaint of dysphagia (09 Nov 2018 08:40)    Awake, alert, lying in bed in NAD. S/p peg placement. Denies cough or sob. Pt has no new complaints.    INTERVAL HPI/OVERNIGHT EVENTS:      VITAL SIGNS:  T(F): 98.3 (11-09-18 @ 04:54)  HR: 96 (11-09-18 @ 04:54)  BP: 102/61 (11-09-18 @ 04:54)  RR: 18 (11-09-18 @ 04:54)  SpO2: 96% (11-09-18 @ 04:54)  Wt(kg): --  I&O's Detail    08 Nov 2018 07:01  -  09 Nov 2018 07:00  --------------------------------------------------------  IN:  Total IN: 0 mL    OUT:    Indwelling Catheter - Urethral: 350 mL  Total OUT: 350 mL    Total NET: -350 mL              REVIEW OF SYSTEMS:    CONSTITUTIONAL:  No fevers, chills, sweats    HEENT:  Eyes:  No diplopia or blurred vision. ENT:  No earache, sore throat or runny nose.    CARDIOVASCULAR:  No pressure, squeezing, tightness, or heaviness about the chest; no palpitations.    RESPIRATORY:  Per HPI    GASTROINTESTINAL:  No abdominal pain, nausea, vomiting or diarrhea.    GENITOURINARY:  No dysuria, frequency or urgency.    NEUROLOGIC:  No paresthesias, fasciculations, seizures or weakness.+ weakness.+facial droop    PSYCHIATRIC:  No disorder of thought or mood.      PHYSICAL EXAM:    Constitutional: Well developed and nourished  Eyes:Perrla  ENMT: normal  Neck:supple  Respiratory: good air entry  Cardiovascular: S1 S2 regular  Gastrointestinal: Soft, Non tender  Extremities: No edema  Vascular:normal  Neurological:Awake, alert,Ox3  Musculoskeletal:Normal      MEDICATIONS  (STANDING):  ALBUTerol/ipratropium for Nebulization 3 milliLiter(s) Nebulizer every 6 hours  artificial  tears Solution 1 Drop(s) Both EYES three times a day  aspirin  chewable 81 milliGRAM(s) Oral daily  atorvastatin 40 milliGRAM(s) Oral at bedtime  cefTRIAXone   IVPB 1 Gram(s) IV Intermittent every 24 hours  docusate sodium Liquid 50 milliGRAM(s) Oral at bedtime  famotidine    Tablet 40 milliGRAM(s) Oral daily  heparin  Injectable 5000 Unit(s) SubCutaneous every 12 hours  levETIRAcetam  Solution 250 milliGRAM(s) Oral two times a day  lisinopril 5 milliGRAM(s) Oral daily    MEDICATIONS  (PRN):      Allergies    Allergy Status Unknown    Intolerances        LABS:                        12.0   7.1   )-----------( 241      ( 08 Nov 2018 06:58 )             37.1     11-08    144  |  108  |  7   ----------------------------<  90  3.2<L>   |  27  |  0.34<L>    Ca    8.6      08 Nov 2018 06:58  Phos  2.5     11-08  Mg     1.9     11-08        Culture - Blood (11.06.18 @ 11:13)    Specimen Source: .Blood Blood    Culture Results:   No growth to date.            CAPILLARY BLOOD GLUCOSE      POCT Blood Glucose.: 106 mg/dL (09 Nov 2018 07:47)        RADIOLOGY & ADDITIONAL TESTS:    CXR:  < from: Xray Chest 1 View-PORTABLE IMMEDIATE (11.05.18 @ 18:30) >      IMPRESSION:     No evidence of any acute lung disease.    < end of copied text >    Ct scan chest:    ekg;    echo:

## 2018-11-09 NOTE — PROGRESS NOTE ADULT - REASON FOR ADMISSION
dysphagia

## 2018-11-09 NOTE — DISCHARGE NOTE ADULT - MEDICATION SUMMARY - MEDICATIONS TO TAKE
I will START or STAY ON the medications listed below when I get home from the hospital:    aspirin 81 mg oral tablet, chewable  -- 1 tab(s) by mouth once a day  -- Indication: For CVA (cerebral vascular accident)    lisinopril 5 mg oral tablet  -- 1 tab(s) by mouth once a day  -- Indication: For HTN (hypertension)    Maalox Antacid [place] Regular Strength oral suspension  -- 10 milliliter(s) by mouth 4 times a day (after meals and at bedtime)  -- Indication: For Gastritis     levETIRAcetam 100 mg/mL oral solution  -- 2.5 milliliter(s) by mouth 2 times a day  -- Indication: For CVA (cerebral vascular accident)    atorvastatin 40 mg oral tablet  -- 1 tab(s) by mouth once a day (at bedtime)  -- Indication: For CVA (cerebral vascular accident)    ipratropium-albuterol 0.5 mg-2.5 mg/3 mLinhalation solution  -- 3 milliliter(s) inhaled every 6 hours  -- Indication: For brochospasm     triamcinolone 0.025% topical cream  -- Apply on skin to affected area 3 times a day on buttocks, inner thigh, and back  -- Indication: For skin rash     nystatin 100,000 units/g topical ointment  -- Apply on skin to affected area 3 times a day to groin  -- Indication: For skin rash     sulfacetamide sodium-sulfur 10%-5% topical lotion  -- Indication: For skin rash     famotidine 40 mg oral tablet  -- 1 tab(s) by mouth once a day  -- Indication: For prophylatic     docusate sodium 10 mg/mL oral liquid  -- 5 milliliter(s) by mouth once a day (at bedtime)  -- Indication: For COnstipation     Senna 8.6 mg oral tablet  -- 2 tab(s) by mouth 2 times a day  -- Indication: For COnstipation     lactulose 10 g/15 mL oral solution  -- 30 milliliter(s) by mouth once a day  -- Indication: For COnstipation     ocular lubricant ophthalmic solution  -- 1 drop(s) to each affected eye 3 times a day  -- Indication: For Dry eyes

## 2018-11-09 NOTE — PROGRESS NOTE ADULT - PROVIDER SPECIALTY LIST ADULT
Gastroenterology
Infectious Disease
Internal Medicine
Pulmonology
Infectious Disease
Infectious Disease
Pulmonology

## 2018-11-09 NOTE — PROGRESS NOTE ADULT - PROBLEM SELECTOR PROBLEM 1
CVA (cerebral vascular accident)
Dysphagia

## 2018-11-09 NOTE — PROGRESS NOTE ADULT - PROBLEM SELECTOR PROBLEM 2
HTN (hypertension)
UTI (urinary tract infection)

## 2018-11-09 NOTE — DISCHARGE NOTE ADULT - CARE PLAN
Principal Discharge DX:	Dysphagia  Goal:	To treat the underlying condition  Assessment and plan of treatment:	You presented to hospital with difficulty swallowing, Palliative care consulted and family agree for Peg tube placement for feeding. Dr Sheryl MEHTA placed the peg tube. Currently patient tolerating tube feeds well. continue with Jevity tube feeds, peg care and follow up with primary care physician in 2 weeks  Secondary Diagnosis:	CVA (cerebral vascular accident)  Assessment and plan of treatment:	stable. continue with aspirin and statin.   maintain aspiration and fall precautions  Secondary Diagnosis:	COPD (chronic obstructive pulmonary disease)  Assessment and plan of treatment:	stable, continue with bronchodilator  Secondary Diagnosis:	UTI (urinary tract infection)  Assessment and plan of treatment:	patient has chronic patrick catheter. treated with Rocephin for 5 days, no further Abx warranted  Secondary Diagnosis:	HTN (hypertension)  Assessment and plan of treatment:	continue with home medications

## 2018-11-09 NOTE — DISCHARGE NOTE ADULT - HOSPITAL COURSE
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 Family requesting PEG tube placement. Patient was recently discharged to NH from [facility] with diagnosis of R MCA CVA. Family acknowledges risks and benefits of PEG tube placement. Patient is DNR/DNI as per family wishes. (05 Nov 2018 17:53)    Problem: Dysphagia.  Plan: Poor oral intake with h/o stroke   peg tube placed on 11/7/2018   started on tube feeds   GI dr Sheryl corona noted   palliative eval noted.      Problem/Plan - 2:  ·  Problem: UTI (urinary tract infection).  Plan: Patient discharged to NH w/ patrick   c/w Rocephin 1g q 24hrs  blood cs neg   urine Cx + proteus pan sensitive completed 5 days of Abx   Dr Hernando corona noted.      Problem/Plan - 3:  ·  Problem: COPD (chronic obstructive pulmonary disease).  Plan: Not in exacerbation  c/w DUONEB's  Pulm chloe noted.      Problem/Plan - 4:  ·  Problem: HTN (hypertension).  Plan: will start on home medications.      Problem/Plan - 5:  ·  Problem: CVA (cerebral vascular accident).  Plan: c/w with aspirin and statin.      Problem/Plan - 6:  Problem: Goals of care, counseling/discussion. Plan: DNR/DNI.    patient is stable for Discharge

## 2018-11-12 PROBLEM — I25.10 ATHEROSCLEROTIC HEART DISEASE OF NATIVE CORONARY ARTERY WITHOUT ANGINA PECTORIS: Chronic | Status: ACTIVE | Noted: 2018-05-08

## 2018-11-12 PROBLEM — J44.9 CHRONIC OBSTRUCTIVE PULMONARY DISEASE, UNSPECIFIED: Chronic | Status: ACTIVE | Noted: 2017-10-28

## 2018-11-12 PROBLEM — K21.9 GASTRO-ESOPHAGEAL REFLUX DISEASE WITHOUT ESOPHAGITIS: Chronic | Status: ACTIVE | Noted: 2018-05-08

## 2018-11-12 PROBLEM — I63.9 CEREBRAL INFARCTION, UNSPECIFIED: Chronic | Status: ACTIVE | Noted: 2017-10-28

## 2018-11-12 RX ORDER — DOCUSATE SODIUM 100 MG
10 CAPSULE ORAL
Qty: 0 | Refills: 0 | COMMUNITY

## 2018-11-12 RX ORDER — SULFACETAMIDE SODIUM/SULFUR 10-5%(W/W)
0 CREAM (GRAM) TOPICAL
Qty: 0 | Refills: 0 | COMMUNITY

## 2018-11-12 RX ORDER — SIMVASTATIN 20 MG/1
1 TABLET, FILM COATED ORAL
Qty: 0 | Refills: 0 | COMMUNITY

## 2018-11-12 RX ORDER — MELOXICAM 15 MG/1
1 TABLET ORAL
Qty: 0 | Refills: 0 | COMMUNITY

## 2018-11-12 RX ORDER — ACETAMINOPHEN 500 MG
2 TABLET ORAL
Qty: 0 | Refills: 0 | COMMUNITY

## 2018-11-12 RX ORDER — NYSTATIN CREAM 100000 [USP'U]/G
1 CREAM TOPICAL
Qty: 0 | Refills: 0 | COMMUNITY

## 2018-11-12 RX ORDER — RANITIDINE HYDROCHLORIDE 150 MG/1
1 TABLET, FILM COATED ORAL
Qty: 0 | Refills: 0 | COMMUNITY

## 2018-11-12 RX ORDER — LEVETIRACETAM 250 MG/1
2.5 TABLET, FILM COATED ORAL
Qty: 0 | Refills: 0 | COMMUNITY

## 2018-11-12 RX ORDER — ASPIRIN/CALCIUM CARB/MAGNESIUM 324 MG
1 TABLET ORAL
Qty: 0 | Refills: 0 | COMMUNITY

## 2018-11-12 RX ORDER — IPRATROPIUM/ALBUTEROL SULFATE 18-103MCG
3 AEROSOL WITH ADAPTER (GRAM) INHALATION
Qty: 0 | Refills: 0 | COMMUNITY

## 2018-11-12 RX ORDER — LACTULOSE 10 G/15ML
30 SOLUTION ORAL
Qty: 0 | Refills: 0 | COMMUNITY

## 2018-11-12 RX ORDER — LISINOPRIL 2.5 MG/1
1 TABLET ORAL
Qty: 0 | Refills: 0 | COMMUNITY

## 2018-11-12 RX ORDER — SENNA PLUS 8.6 MG/1
2 TABLET ORAL
Qty: 0 | Refills: 0 | COMMUNITY

## 2021-06-24 NOTE — DISCHARGE NOTE ADULT - HAS THE PATIENT RECEIVED THE INFLUENZA VACCINE DURING THIS VISIT
Size Of Lesion In Cm (Optional): 0
Body Location Override (Optional - Billing Will Still Be Based On Selected Body Map Location If Applicable): R arm
Detail Level: Simple
Body Location Override (Optional - Billing Will Still Be Based On Selected Body Map Location If Applicable): central back
Body Location Override (Optional - Billing Will Still Be Based On Selected Body Map Location If Applicable): L knee
Body Location Override (Optional - Billing Will Still Be Based On Selected Body Map Location If Applicable): L cheek
Body Location Override (Optional - Billing Will Still Be Based On Selected Body Map Location If Applicable): R upper back
Body Location Override (Optional - Billing Will Still Be Based On Selected Body Map Location If Applicable): central upper back
No

## 2022-03-23 NOTE — PROGRESS NOTE ADULT - SUBJECTIVE AND OBJECTIVE BOX
Group Topic: aWhere    Date: 3/23/2022  Start Time: 1730  End Time: 1815  Facilitators: Nikki Butterfield    Focus: Board Games  Number in attendance: 3    Pt was invited to participate in healthy leisure group by engaging in a social activity such as board games, card games, coloring, and listening to music. Benefits of healthy leisure may include reduction of stress, relaxation, distraction, and overall satisfaction with life.      Pt was recruited for group but did not attend. Efforts to encourage participation in programming on the unit will continue.  Nikki Butterfield, LENARDS       Patient is a 89y old  Female who presents with a chief complaint of dysphagia (08 Nov 2018 14:45)    pt seen in icu [  ], reg med floor [ x  ], bed [x  ], chair at bedside [   ], awake and responsive [ x ], lethargic [  ],  nad [ x ]    patrick [x  ], peg [x  ],       Allergies    Allergy Status Unknown        Vitals    T(F): 98.3 (11-09-18 @ 04:54), Max: 98.9 (11-08-18 @ 21:52)  HR: 96 (11-09-18 @ 04:54) (90 - 98)  BP: 102/61 (11-09-18 @ 04:54) (102/61 - 139/76)  RR: 18 (11-09-18 @ 04:54) (18 - 18)  SpO2: 96% (11-09-18 @ 04:54) (95% - 98%)  Wt(kg): --  CAPILLARY BLOOD GLUCOSE      POCT Blood Glucose.: 106 mg/dL (09 Nov 2018 07:47)      Labs                          12.0   7.1   )-----------( 241      ( 08 Nov 2018 06:58 )             37.1       11-08    144  |  108  |  7   ----------------------------<  90  3.2<L>   |  27  |  0.34<L>    Ca    8.6      08 Nov 2018 06:58  Phos  2.5     11-08  Mg     1.9     11-08              .Blood Blood  11-06 @ 11:13   No growth to date.  --  --      .Urine Clean Catch (Midstream)  11-05 @ 23:53   >100,000 CFU/ml Proteus mirabilis  --  Proteus mirabilis          Radiology Results      Meds    MEDICATIONS  (STANDING):  ALBUTerol/ipratropium for Nebulization 3 milliLiter(s) Nebulizer every 6 hours  artificial  tears Solution 1 Drop(s) Both EYES three times a day  aspirin  chewable 81 milliGRAM(s) Oral daily  atorvastatin 40 milliGRAM(s) Oral at bedtime  cefTRIAXone   IVPB 1 Gram(s) IV Intermittent every 24 hours  docusate sodium Liquid 50 milliGRAM(s) Oral at bedtime  famotidine    Tablet 40 milliGRAM(s) Oral daily  heparin  Injectable 5000 Unit(s) SubCutaneous every 12 hours  levETIRAcetam  Solution 250 milliGRAM(s) Oral two times a day  lisinopril 5 milliGRAM(s) Oral daily      MEDICATIONS  (PRN):      Physical Exam      Neuro :  b/l weakness  Respiratory: CTA B/L  CV: RRR, S1S2, no murmurs,   Abdominal: Soft, NT, ND +BS, peg inplace  Extremities: No edema, + peripheral pulses, right wrist contracted    ASSESSMENT    Dysphagia  uti possible 2nd to chronic patrick  h/o COPD (chronic obstructive pulmonary disease)  HTN (hypertension)  CVA (cerebral vascular accident)  History of cataract surgery  chronic patrick  No significant past surgical history        PLAN      gi f/u  s/p peg placement   cont peg feeding  palliative eval noted  id f/u   ucx and sens with proteus  Continue Rocephin 1 gm IVPB q day, to change upon discharge to Ceftin PO/  mg q 12 till 11/11/18.  pulm f/u   cont bronchodilators  cont current meds  d/c planning Patient is a 89y old  Female who presents with a chief complaint of dysphagia (08 Nov 2018 14:45)    pt seen in icu [  ], reg med floor [ x  ], bed [x  ], chair at bedside [   ], awake and responsive [ x ], lethargic [  ],  nad [ x ]    patrick [x  ], peg [x  ],       Allergies    Allergy Status Unknown        Vitals    T(F): 98.3 (11-09-18 @ 04:54), Max: 98.9 (11-08-18 @ 21:52)  HR: 96 (11-09-18 @ 04:54) (90 - 98)  BP: 102/61 (11-09-18 @ 04:54) (102/61 - 139/76)  RR: 18 (11-09-18 @ 04:54) (18 - 18)  SpO2: 96% (11-09-18 @ 04:54) (95% - 98%)  Wt(kg): --  CAPILLARY BLOOD GLUCOSE      POCT Blood Glucose.: 106 mg/dL (09 Nov 2018 07:47)      Labs                          12.0   7.1   )-----------( 241      ( 08 Nov 2018 06:58 )             37.1       11-08    144  |  108  |  7   ----------------------------<  90  3.2<L>   |  27  |  0.34<L>    Ca    8.6      08 Nov 2018 06:58  Phos  2.5     11-08  Mg     1.9     11-08              .Blood Blood  11-06 @ 11:13   No growth to date.  --  --      .Urine Clean Catch (Midstream)  11-05 @ 23:53   >100,000 CFU/ml Proteus mirabilis  --  Proteus mirabilis          Radiology Results      Meds    MEDICATIONS  (STANDING):  ALBUTerol/ipratropium for Nebulization 3 milliLiter(s) Nebulizer every 6 hours  artificial  tears Solution 1 Drop(s) Both EYES three times a day  aspirin  chewable 81 milliGRAM(s) Oral daily  atorvastatin 40 milliGRAM(s) Oral at bedtime  cefTRIAXone   IVPB 1 Gram(s) IV Intermittent every 24 hours  docusate sodium Liquid 50 milliGRAM(s) Oral at bedtime  famotidine    Tablet 40 milliGRAM(s) Oral daily  heparin  Injectable 5000 Unit(s) SubCutaneous every 12 hours  levETIRAcetam  Solution 250 milliGRAM(s) Oral two times a day  lisinopril 5 milliGRAM(s) Oral daily      MEDICATIONS  (PRN):      Physical Exam      Neuro :  b/l weakness  Respiratory: CTA B/L  CV: RRR, S1S2, no murmurs,   Abdominal: Soft, NT, ND +BS, peg inplace  Extremities: No edema, + peripheral pulses, right wrist contracted    ASSESSMENT    Dysphagia  uti possible 2nd to chronic patrick  h/o COPD (chronic obstructive pulmonary disease)  HTN (hypertension)  CVA (cerebral vascular accident)  History of cataract surgery  chronic patrick  No significant past surgical history        PLAN      gi f/u  s/p peg placement   cont peg feeding  palliative eval noted  id f/u   ucx and sens with proteus  Continue abx as per id  pulm f/u   cont bronchodilators  cont current meds  d/c planning

## 2022-09-06 NOTE — DISCHARGE NOTE ADULT - NS AS DC FU INST LIST INST
Zeinab Bryan from [facility] is reaching out stating that they need a form faxed over. She explains it needs to state for Physical Therapy to Evaluate and Treat for car seat. She stated that the one they received in June doesn't state all of that information. Refer back to the encounter 6/22/22. Zeinab Bryan provided a phone number if there is any questions as well as the fax.   Phone: [phone] no

## 2023-01-09 NOTE — PROGRESS NOTE ADULT - PROBLEM/PLAN-7
"Veronica Wilkinson is a 75 y.o. female returns for     Chief Complaint   Patient presents with   • Cervical Spine - Pain, Initial Evaluation   • Osteoporosis     HISTORY OF PRESENT ILLNESS: Patient presents to [facility] for follow-up of osteoporosis.  Patient established care with the [facility] on 9/14/2020 and was started on Prolia for treatment of osteoporosis.  She had also previously taken Fosamax several years ago for an unknown amount of time.  Patient started Prolia on 11/6/2020 and has continued with this treatment therapy.  She has not missed any doses.  Patient is tolerating the Prolia well with no known adverse effects.  Patient had a recent repeat bone density study performed on 1/5/2023 with improvement in her bone density.  Patient continues to take calcium supplementation (Os-Scott) twice daily.  Patient continues to take vitamin D supplementation of 1000 units daily.  She is overdue for a repeat vitamin D level and her last level was checked on 5/14/2020, which was normal at 52.1.  The patient has not had any issues with hypocalcemia since starting Prolia.  Patient denies any known changes to her bone health and has not sustained any fractures since her last office visit.    Previously identified individualized risk factors for osteoporosis include her age, race, gender, postmenopausal status, long-term use of proton pump inhibitors for control of reflux, history of bariatric surgery and history of vitamin D deficiency.  The patient is a non-smoker with no history of smoking.    Patient also complains today of acute right-sided neck pain, which started a few months ago with no known injury or incident.  Patient describes pain to the right side of her neck that radiates into her shoulder and sometimes into her right arm.  She also reports progressively worsening right  weakness.  No numbness or tingling reported.  Patient voices concerns about \"a pinched nerve\" in her neck.  X-rays " prescription for oxycodone, colace and Zofran given to the patient's . "of the cervical spine performed in office today.      CONCURRENT MEDICAL HISTORY:    The following portions of the patient's history were reviewed and updated as appropriate: allergies, current medications, past family history, past medical history, past social history, past surgical history and problem list.     ROS  No fevers or chills.  No chest pain or shortness of air.  No GI or  disturbances.  Neck pain.    PHYSICAL EXAMINATION:       Ht 152.4 cm (60\")   Wt 87.5 kg (193 lb)   BMI 37.69 kg/m²     Physical Exam  Vitals reviewed.   Constitutional:       General: She is not in acute distress.     Appearance: She is well-developed. She is not ill-appearing.   HENT:      Head: Normocephalic.   Pulmonary:      Effort: Pulmonary effort is normal. No respiratory distress.   Abdominal:      General: There is no distension.      Palpations: Abdomen is soft.   Musculoskeletal:         General: Tenderness (Mild, cervical spine, right paraspinal cervical ) present. No swelling, deformity or signs of injury.   Skin:     General: Skin is warm and dry.      Capillary Refill: Capillary refill takes less than 2 seconds.      Findings: No erythema.   Neurological:      Mental Status: She is alert and oriented to person, place, and time.      GCS: GCS eye subscore is 4. GCS verbal subscore is 5. GCS motor subscore is 6.   Psychiatric:         Speech: Speech normal.         Behavior: Behavior normal.         Thought Content: Thought content normal.         Judgment: Judgment normal.         GAIT:     [x]  Normal  []  Antalgic    Assistive device: [x]  None  []  Walker     []  Crutches  []  Cane     []  Wheelchair  []  Stretcher    Back Exam     Tenderness   The patient is experiencing tenderness in the cervical (Mild).    Other   Sensation: normal  Gait: normal     Comments:  No deformity or kyphosis noted.  No focal neurological deficits noted.  Mild pain and limitations with range of motion of the cervical spine with pain " elicited with forward flexion.  Mild tenderness to palpation in the right paraspinal cervical area.            XR Spine Cervical 2 or 3 View    Result Date: 1/11/2023  Narrative: PROCEDURE: XR SPINE CERVICAL 2 OR 3 VW VIEWS: 3 INDICATION:  Neck pain COMPARISON: None FINDINGS: A cervical spine plain film examination was performed consisting of five radiographic views. There is no evidence of a fracture, bony malalignment, or prevertebral  soft tissue swelling. There are age related degenerative disk and facet  changes.     Impression: No acute osseous abnormality (Please note:  If pain or symptoms persist beyond reasonable expectations, a follow-up MRI examination is suggested, as is deemed clinically appropriate. Electronically signed by:  Joselyn Newman MD  1/11/2023 4:20 PM CST Workstation: [id number]    DEXA Bone Density Axial    Result Date: 1/5/2023  Narrative: DEXA scan HISTORY: Osteoporosis screening postmenopausal Scans were obtained at the L1-L4 levels and of each hip. FINDINGS: Average bone mineral density for the lumbar spine is 1.048 g/sq cm. The T score of 0.0 corresponds to a WHO of classification of normal bone density. Change of 2.8% from baseline and change of 2.9% from 8/10/2020. The bone mineral density for the left femoral neck is 0.523 g/sq cm. The T score of -2.9 corresponds to a WHO classification of osteoporosis. Total left hip T score of 0.0 corresponds to normal bone density. The bone mineral density for the right femoral neck is 0.521 g/sq cm. The T score of -3.0 corresponds to a WHO classification of osteoporosis. Total right hip T score of -0.4 corresponds to normal bone density. Change of -7.3% in mean total hip bone density from baseline and change of 9.9% from 8/10/2020.     Impression: CONCLUSION: Lumbar spine normal bone density. Femoral necks osteoporosis. Fracture risk high. Bone densities have increased from prior study. [id number] Electronically signed by:  Josias Cadena MD  1/5/2023  7:00 PM [facility] Workstation: [id number]        ASSESSMENT:    Diagnoses and all orders for this visit:    Age-related osteoporosis without current pathological fracture    Neck pain  -     XR Spine Cervical 2 or 3 View; Future  -     triamcinolone acetonide (KENALOG-40) injection 80 mg    Postmenopausal    History of bariatric surgery    History of vitamin D deficiency    PLAN     The patient has had a recent repeat bone density study and I have reviewed those results today and discussed those with the patient.  We discussed that she has a T score of 0.0 in the lumbar spine, indicative of normal bone density.  She has experienced an increase of 2.9% in the bone mineral density in her lumbar spine from her last study in August 2020.  We discussed that she has a T score of -2.9 in the left femoral neck and a T score of -3.0 in the right femoral neck, both are indicative of jose d osteoporosis.  However, she has experienced an increase of 9.9% in her bone mineral density of her hips since her last study in August 2020.  The patient is tolerating the Prolia well with no known issues and she has experienced improvement in her bone mineral density.  The patient started Prolia initially on 11/6/2020 and has not missed any doses since that time.  We discussed that osteoporosis is a chronic, progressive and silent disease it typically requires long-term management.  I have recommended that she continue with Prolia injections for treatment of osteoporosis and the patient is in agreement with this.    Recommend to continue with her calcium supplementation (Os-Scott) twice daily.  Recommend to continue with her vitamin D supplementation of 1000 units daily.  We discussed the importance of taking the calcium supplement for support of good bone health but also to prevent low blood calcium while taking Prolia injections.  The patient has not experienced any issues with hypocalcemia since starting Prolia.  The patient has a history of  "vitamin D deficiency and she is due for a repeat vitamin D level to be checked.  Her last vitamin D level was checked on 5/14/2020 and was normal at that time at 52.1.  We can get a repeat vitamin D level with her next scheduled blood draw.    Recommend to continue with efforts for consistent weightbearing exercise, such as walking, which will help to keep her bones and muscle strong and prevent worsening osteoporosis.  The patient is a non-smoker with no history of smoking so smoking cessation is not addressed or applicable.    Recommend a repeat bone density study in 1 to 2 years and follow-up with [facility].    Patient also complains today of right-sided neck pain, which she states has been an ongoing issue now for a few months. She has not sustained any known injuries to her neck.  She has pain that radiates into the right shoulder and we discussed the possibility that she may have a shoulder issue and her right-sided neck pain may be related to muscle tension.  However, she also reports that she has some pain in her right arm as well as some progressively worsening subjective  weakness on the right side and she voices concerns about \"a pinched nerve\" in her neck.  X-rays of the cervical spine performed in office today and reviewed with no acute findings noted.  The patient has some mild spondylitic changes at multiple levels but nothing significant.  We discussed and I have offered to order MRI imaging of the cervical spine for further evaluation.  The patient declines this for now but states she will consider it in the future if her pain persists or worsens.  However, while we discussed MRI imaging, it is now noticed that she has presence of a pacemaker so she would need CT myelogram for further evaluation of her cervical spine and would not be a candidate for MRI imaging recommend proceeding today with an intramuscular injection of Kenalog 80 mg for management of joint pain/inflammation in the " cervical spine.  I have encouraged the patient to follow-up as needed if her pain persists or worsens.  We also discussed that she can call the office and notify me if she would like to proceed with advanced imaging at any point and I can place that order.    Time spent of a minimum of 30 minutes including the face to face evaluation, reviewing of medical history and prior medial records, reviewing of diagnostic studies, ordering additional tests, prescription drug management, documentation, patient education and coordination of care.     EMR Dragon/Transciption Disclaimer: Some of this note may be an electronic transcription/translation of spoken language to printed text using the Dragon Dictation System.     Return in about 1 year (around 1/9/2024) for Recheck.        This document has been electronically signed by TABBY Moscoso on January 11, 2023 16:22 CST      TABBY Moscoso   DISPLAY PLAN FREE TEXT

## 2023-12-08 NOTE — H&P ADULT - PROBLEM SELECTOR PLAN 4
likely due to insufficient PO intake  -will rehydrate volume depleted patient with D5+1/2NS for now gently at 60cc/hr  -monitor bmp q12 h for now IBW